# Patient Record
Sex: FEMALE | Race: WHITE | NOT HISPANIC OR LATINO | URBAN - METROPOLITAN AREA
[De-identification: names, ages, dates, MRNs, and addresses within clinical notes are randomized per-mention and may not be internally consistent; named-entity substitution may affect disease eponyms.]

---

## 2020-07-30 ENCOUNTER — OFFICE VISIT (OUTPATIENT)
Dept: FAMILY MEDICINE CLINIC | Facility: CLINIC | Age: 55
End: 2020-07-30
Payer: COMMERCIAL

## 2020-07-30 ENCOUNTER — APPOINTMENT (OUTPATIENT)
Dept: RADIOLOGY | Facility: CLINIC | Age: 55
End: 2020-07-30
Payer: COMMERCIAL

## 2020-07-30 VITALS
HEIGHT: 66 IN | SYSTOLIC BLOOD PRESSURE: 120 MMHG | DIASTOLIC BLOOD PRESSURE: 84 MMHG | TEMPERATURE: 98.9 F | BODY MASS INDEX: 14.3 KG/M2 | WEIGHT: 89 LBS | RESPIRATION RATE: 24 BRPM | OXYGEN SATURATION: 99 % | HEART RATE: 115 BPM

## 2020-07-30 DIAGNOSIS — Z11.4 SCREENING FOR HIV (HUMAN IMMUNODEFICIENCY VIRUS): ICD-10-CM

## 2020-07-30 DIAGNOSIS — Z93.3 COLOSTOMY STATUS (HCC): ICD-10-CM

## 2020-07-30 DIAGNOSIS — F41.1 GAD (GENERALIZED ANXIETY DISORDER): ICD-10-CM

## 2020-07-30 DIAGNOSIS — Z00.00 ANNUAL PHYSICAL EXAM: Primary | ICD-10-CM

## 2020-07-30 DIAGNOSIS — F32.0 CURRENT MILD EPISODE OF MAJOR DEPRESSIVE DISORDER WITHOUT PRIOR EPISODE (HCC): ICD-10-CM

## 2020-07-30 DIAGNOSIS — Z12.31 ENCOUNTER FOR SCREENING MAMMOGRAM FOR BREAST CANCER: ICD-10-CM

## 2020-07-30 DIAGNOSIS — F10.20 ALCOHOLISM (HCC): ICD-10-CM

## 2020-07-30 DIAGNOSIS — F17.200 TOBACCO USE DISORDER: ICD-10-CM

## 2020-07-30 DIAGNOSIS — R06.02 SOB (SHORTNESS OF BREATH): ICD-10-CM

## 2020-07-30 DIAGNOSIS — Z12.11 SCREENING FOR COLORECTAL CANCER: ICD-10-CM

## 2020-07-30 DIAGNOSIS — Z13.6 SCREENING FOR CARDIOVASCULAR CONDITION: ICD-10-CM

## 2020-07-30 DIAGNOSIS — Z12.12 SCREENING FOR COLORECTAL CANCER: ICD-10-CM

## 2020-07-30 DIAGNOSIS — Z13.1 SCREENING FOR DIABETES MELLITUS: ICD-10-CM

## 2020-07-30 PROCEDURE — 71046 X-RAY EXAM CHEST 2 VIEWS: CPT

## 2020-07-30 PROCEDURE — 99386 PREV VISIT NEW AGE 40-64: CPT | Performed by: FAMILY MEDICINE

## 2020-07-30 NOTE — ASSESSMENT & PLAN NOTE
BMI Counseling: Body mass index is 14 36 kg/m²  The BMI is below normal  Patient was advised to gain weight  Dietary education for weight gain was provided to the patient today

## 2020-07-30 NOTE — PATIENT INSTRUCTIONS

## 2020-07-30 NOTE — PROGRESS NOTES
[address] [facility]    NAME: Jayshree Huang  AGE: 53 y o  SEX: female  : 1965     DATE: 2020     Assessment and Plan:     Problem List Items Addressed This Visit        Other    Colostomy status ([facility] )    Relevant Orders    Ambulatory referral to Gastroenterology    KRAIG (generalized anxiety disorder)    Relevant Orders    Lipid panel    Comprehensive metabolic panel    CBC and differential    TSH, 3rd generation with Free T4 reflex    UA (URINE) with reflex to Scope    Current mild episode of major depressive disorder without prior episode ([place] [id number] )     Depression Screening Follow-up Plan: Patient's depression screening was positive with a PHQ-2 score of 6  Their PHQ-9 score was 27  Patient declines further evaluation by mental health professional and/or medications  They have no active suicidal ideations  Brief counseling provided and recommend additional follow-up/re-evaluation at next office visit  Patient offered resources for help with her depression/anxiety/alcohol use disorder but she is not willing to seek care at this time          Relevant Orders    Lipid panel    Comprehensive metabolic panel    CBC and differential    TSH, 3rd generation with Free T4 reflex    UA (URINE) with reflex to Scope    Tobacco use disorder    Relevant Orders    XR chest pa & lateral    Alcoholism ([place] [address] )    Relevant Orders    Ambulatory referral to Gastroenterology    Lipid panel    Comprehensive metabolic panel    CBC and differential    TSH, 3rd generation with Free T4 reflex    UA (URINE) with reflex to Scope    Body mass index (BMI) 19 9 or less, adult     BMI Counseling: Body mass index is 14 36 kg/m²  The BMI is below normal  Patient was advised to gain weight  Dietary education for weight gain was provided to the patient today             Other Visit Diagnoses     Annual physical exam    -  Primary    Relevant Orders    Lipid panel Comprehensive metabolic panel    CBC and differential    TSH, 3rd generation with Free T4 reflex    UA (URINE) with reflex to Scope    Encounter for screening mammogram for breast cancer        Relevant Orders    Mammo screening bilateral w 3d & cad    Screening for colorectal cancer        Relevant Orders    Ambulatory referral to Gastroenterology    Screening for diabetes mellitus        Relevant Orders    Lipid panel    Comprehensive metabolic panel    CBC and differential    TSH, 3rd generation with Free T4 reflex    UA (URINE) with reflex to Scope    Screening for cardiovascular condition        Relevant Orders    Lipid panel    Comprehensive metabolic panel    CBC and differential    TSH, 3rd generation with Free T4 reflex    UA (URINE) with reflex to Scope    Screening for HIV (human immunodeficiency virus)        SOB (shortness of breath)        Relevant Orders    XR chest pa & lateral          New Patient est care today  She has not seen a doctor in years  She has poor insight into her health issues  I had a long discussion with her about my concerns with her alcohol use, depression and anxiety and offered to est her with [facility] and [facility] resources for recovery and addiction  She state she is not ready to do this  In regards to her tobacco use I am sure she has COPD for her 40 pack year history she was not interested in testing but was agreeable to at least get a CXR  I did order some blood work for her as well  She has a colostomy in place from her history of pelvic infection in 2015  She does not see why I want to refer her to GI for follow up  I advised her to see them ensure everything is stable and for what I suspect will be alcoholic related liver disease  I will see her back in 1 month or sooner if needed         Immunizations and preventive care screenings were discussed with patient today  Appropriate education was printed on patient's after visit summary      Counseling:  Alcohol/drug use: discussed moderation in alcohol intake, the recommendations for healthy alcohol use, and avoidance of illicit drug use  Dental Health: discussed importance of regular tooth brushing, flossing, and dental visits  Injury prevention: discussed safety/seat belts, safety helmets, smoke detectors, carbon dioxide detectors, and smoking near bedding or upholstery  Sexual health: discussed sexually transmitted diseases, partner selection, use of condoms, avoidance of unintended pregnancy, and contraceptive alternatives  · Exercise: the importance of regular exercise/physical activity was discussed  Recommend exercise 3-5 times per week for at least 30 minutes  Return in about 4 weeks (around 8/27/2020) for lab review  Chief Complaint:     Chief Complaint   Patient presents with    [facility]      History of Present Illness:     Adult Annual Physical   Patient here for a comprehensive physical exam    Has not seen a family doctor in 3 years  She has depression/anxiety  Reports history of pelvic floor mass -states she now has a colostomy     Social: tobacco use- roles her own cigs x 40 years      Diet and Physical Activity  · Diet/Nutrition: well balanced diet  · Exercise: no formal exercise        Depression Screening  PHQ-9 Depression Screening    PHQ-9:    Frequency of the following problems over the past two weeks:       Little interest or pleasure in doing things:  3 - nearly every day  Feeling down, depressed, or hopeless:  3 - nearly every day  Trouble falling or staying asleep, or sleeping too much:  3 - nearly every day  Feeling tired or having little energy:  3 - nearly every day  Poor appetite or overeating:  3 - nearly every day  Feeling bad about yourself - or that you are a failure or have let yourself or your family down:  3 - nearly every day  Trouble concentrating on things, such as reading the newspaper or watching television:  3 - nearly every day  Moving or speaking so slowly that other people could have noticed  Or the opposite - being so fidgety or restless that you have been moving around a lot more than usual:  3 - nearly every day  Thoughts that you would be better off dead, or of hurting yourself in some way:  3 - nearly every day  PHQ-2 Score:  6  PHQ-9 Score:  27       /GYN Health  · Patient reports complete hysterectomy      Review of Systems:     Review of Systems   Constitutional: Negative for chills, fatigue and fever  HENT: Negative for congestion, postnasal drip, rhinorrhea and sinus pressure  Eyes: Negative for photophobia and visual disturbance  Respiratory: Positive for cough and shortness of breath  Cardiovascular: Negative for chest pain, palpitations and leg swelling  Gastrointestinal: Negative for abdominal pain, constipation, diarrhea, nausea and vomiting  Genitourinary: Negative for difficulty urinating and dysuria  Musculoskeletal: Negative for arthralgias and myalgias  Skin: Negative for color change and rash  Neurological: Negative for dizziness, weakness, light-headedness and headaches        Past Medical History:     Past Medical History:   Diagnosis Date    Alcoholism ([facility] [address] )     Depression     Hypothyroidism     PTSD (post-traumatic stress disorder)       Past Surgical History:     Past Surgical History:   Procedure Laterality Date    COLOSTOMY      CYSTOSCOPY W/ URETERAL STENT PLACEMENT Left     EXPLORATORY LAPAROTOMY      HYSTERECTOMY      removal of both ovaries      Social History:        Social History     Socioeconomic History    Marital status: /Civil Union     Spouse name: None    Number of children: None    Years of education: None    Highest education level: None   Occupational History    None   Social Needs    Financial resource strain: None    Food insecurity:     Worry: Never true     Inability: Never true    Transportation needs:     Medical: No     Non-medical: No   Tobacco Use    Smoking status: Current Every Day Smoker     Types: Cigarettes    Smokeless tobacco: Current User   Substance and Sexual Activity    Alcohol use: Yes     Frequency: 4 or more times a week     Drinks per session: 10 or more     Binge frequency: Daily or almost daily     Comment: drinks beer    Drug use: Not Currently     Comment: Denies use    Sexual activity: Not Currently   Lifestyle    Physical activity:     Days per week: 0 days     Minutes per session: 0 min    Stress: Very much   Relationships    Social connections:     Talks on phone: More than three times a week     Gets together: Never     Attends Scientologist service: Never     Active member of club or organization: No     Attends meetings of clubs or organizations: Never     Relationship status:     Intimate partner violence:     Fear of current or ex partner: No     Emotionally abused: No     Physically abused: No     Forced sexual activity: No   Other Topics Concern    None   Social History Narrative    None      Family History:     Family History   Problem Relation Age of Onset    Heart disease Mother     Stroke Mother     Hypertension Mother     Kidney disease Mother     No Known Problems Father     No Known Problems Sister     No Known Problems Brother       Current Medications:     No current outpatient medications on file  No current facility-administered medications for this visit  Allergies:     No Known Allergies   Physical Exam:     /84   Pulse (!) 115   Temp 98 9 °F (37 2 °C) (Tympanic)   Resp (!) 24   Ht 5' 6" (1 676 m)   Wt 40 4 kg (89 lb)   SpO2 99%   BMI 14 36 kg/m²     Physical Exam   Constitutional: She is oriented to person, place, and time  She appears cachectic  Chronically ill appearing   Appears older than stated age    HENT:   Head: Normocephalic and atraumatic  Mouth/Throat: Oropharynx is clear and moist    Eyes: Pupils are equal, round, and reactive to light  Neck: Normal range of motion  Neck supple  Cardiovascular: Normal rate, regular rhythm and normal heart sounds  Pulmonary/Chest: Effort normal  No respiratory distress  She has decreased breath sounds  She has no wheezes  She has rhonchi  Abdominal: Soft  Bowel sounds are normal  She exhibits no distension  There is no tenderness  Colostomy in place   Musculoskeletal: Normal range of motion  She exhibits no edema or tenderness  Neurological: She is alert and oriented to person, place, and time  Skin: Skin is warm and dry  Psychiatric: She has a normal mood and affect   Her behavior is normal        Jul[address] Cesar [address], [address]

## 2020-07-30 NOTE — ASSESSMENT & PLAN NOTE
Depression Screening Follow-up Plan: Patient's depression screening was positive with a PHQ-2 score of 6  Their PHQ-9 score was 27  Patient declines further evaluation by mental health professional and/or medications  They have no active suicidal ideations  Brief counseling provided and recommend additional follow-up/re-evaluation at next office visit    Patient offered resources for help with her depression/anxiety/alcohol use disorder but she is not willing to seek care at this time

## 2020-07-31 DIAGNOSIS — R93.89 ABNORMAL CXR: ICD-10-CM

## 2020-07-31 DIAGNOSIS — F17.200 TOBACCO USE DISORDER: Primary | ICD-10-CM

## 2020-07-31 DIAGNOSIS — R06.02 SOB (SHORTNESS OF BREATH): ICD-10-CM

## 2020-08-14 ENCOUNTER — TELEPHONE (OUTPATIENT)
Dept: OTHER | Facility: OTHER | Age: 55
End: 2020-08-14

## 2020-08-14 ENCOUNTER — TELEPHONE (OUTPATIENT)
Dept: FAMILY MEDICINE CLINIC | Facility: CLINIC | Age: 55
End: 2020-08-14

## 2020-08-14 DIAGNOSIS — Z93.3 COLOSTOMY STATUS (HCC): Primary | ICD-10-CM

## 2020-08-14 RX ORDER — COLOSTOMY-ILEOST.SET,NONSTERIL 12"
EACH MISCELLANEOUS DAILY
Qty: 1 KIT | Refills: 0 | Status: SHIPPED | OUTPATIENT
Start: 2020-08-14

## 2020-08-14 NOTE — TELEPHONE ENCOUNTER
Patient was referred to GI at our last visit for follow up of her colostomy  I do not know what supplies she needs as she just established care here and we have no specifics  She needs to get more specifics from the pharmacy or medical supply company or reach out to GI as I recommended at our visit

## 2020-08-14 NOTE — PROGRESS NOTES
Called patient to review her concerns about her colostomy supplies  She states she is almost out of her bags  She wishes a Rx sent to [phone] ([facility])  Patient knows she needs to est care with GI and has an appointment in 2 weeks  She admits she has been drinking again  She denies any thoughts of self harm  She states she will always be honest with her doctor   She will call us if this should change or seek care immediately at the ER

## 2020-08-14 NOTE — TELEPHONE ENCOUNTER
Melissa Robertson from [address] called  She said that patient had called them to schedule an appt  She told them that we wouldn't order colostomy supplies for her and she said patient sounded intoxicated  She said patient mentioned that she doesn't mind if she sees god, and that she has the means to "off" herself  I let her know that the patient is new to us, and that we wanted patient to get the information for her colostomy bags, but she never did  I told her I would pass the information to you

## 2025-04-02 ENCOUNTER — APPOINTMENT (EMERGENCY)
Dept: CT IMAGING | Facility: HOSPITAL | Age: 60
End: 2025-04-02
Payer: COMMERCIAL

## 2025-04-02 ENCOUNTER — HOSPITAL ENCOUNTER (EMERGENCY)
Facility: HOSPITAL | Age: 60
Discharge: HOME | End: 2025-04-02
Attending: EMERGENCY MEDICINE
Payer: COMMERCIAL

## 2025-04-02 VITALS
RESPIRATION RATE: 16 BRPM | TEMPERATURE: 98.2 F | DIASTOLIC BLOOD PRESSURE: 76 MMHG | SYSTOLIC BLOOD PRESSURE: 128 MMHG | HEART RATE: 77 BPM | OXYGEN SATURATION: 96 %

## 2025-04-02 DIAGNOSIS — R26.2 AMBULATORY DYSFUNCTION: ICD-10-CM

## 2025-04-02 DIAGNOSIS — Z93.3 COLOSTOMY STATUS (HCC): Primary | ICD-10-CM

## 2025-04-02 DIAGNOSIS — R33.9 URINARY RETENTION: ICD-10-CM

## 2025-04-02 DIAGNOSIS — K59.00 CONSTIPATION: ICD-10-CM

## 2025-04-02 DIAGNOSIS — E87.1 HYPONATREMIA: ICD-10-CM

## 2025-04-02 LAB
ALBUMIN SERPL BCG-MCNC: 4.4 G/DL (ref 3.5–5)
ALP SERPL-CCNC: 83 U/L (ref 34–104)
ALT SERPL W P-5'-P-CCNC: 12 U/L (ref 7–52)
AMPHETAMINES SERPL QL SCN: NEGATIVE
ANION GAP SERPL CALCULATED.3IONS-SCNC: 7 MMOL/L (ref 4–13)
AST SERPL W P-5'-P-CCNC: 15 U/L (ref 13–39)
BARBITURATES UR QL: NEGATIVE
BASOPHILS # BLD AUTO: 0.11 THOUSANDS/ÂΜL (ref 0–0.1)
BASOPHILS NFR BLD AUTO: 1 % (ref 0–1)
BENZODIAZ UR QL: NEGATIVE
BILIRUB SERPL-MCNC: 0.63 MG/DL (ref 0.2–1)
BILIRUB UR QL STRIP: NEGATIVE
BUN SERPL-MCNC: 5 MG/DL (ref 5–25)
CALCIUM SERPL-MCNC: 9.7 MG/DL (ref 8.4–10.2)
CHLORIDE SERPL-SCNC: 93 MMOL/L (ref 96–108)
CLARITY UR: CLEAR
CO2 SERPL-SCNC: 30 MMOL/L (ref 21–32)
COCAINE UR QL: NEGATIVE
COLOR UR: ABNORMAL
CREAT SERPL-MCNC: 0.4 MG/DL (ref 0.6–1.3)
EOSINOPHIL # BLD AUTO: 0.16 THOUSAND/ÂΜL (ref 0–0.61)
EOSINOPHIL NFR BLD AUTO: 2 % (ref 0–6)
ERYTHROCYTE [DISTWIDTH] IN BLOOD BY AUTOMATED COUNT: 13 % (ref 11.6–15.1)
FENTANYL UR QL SCN: NEGATIVE
GFR SERPL CREATININE-BSD FRML MDRD: 113 ML/MIN/1.73SQ M
GLUCOSE SERPL-MCNC: 113 MG/DL (ref 65–140)
GLUCOSE UR STRIP-MCNC: NEGATIVE MG/DL
HCT VFR BLD AUTO: 45.7 % (ref 34.8–46.1)
HGB BLD-MCNC: 15.4 G/DL (ref 11.5–15.4)
HGB UR QL STRIP.AUTO: NEGATIVE
HYDROCODONE UR QL SCN: NEGATIVE
IMM GRANULOCYTES # BLD AUTO: 0.04 THOUSAND/UL (ref 0–0.2)
IMM GRANULOCYTES NFR BLD AUTO: 1 % (ref 0–2)
KETONES UR STRIP-MCNC: ABNORMAL MG/DL
LEUKOCYTE ESTERASE UR QL STRIP: NEGATIVE
LIPASE SERPL-CCNC: 19 U/L (ref 11–82)
LYMPHOCYTES # BLD AUTO: 2.08 THOUSANDS/ÂΜL (ref 0.6–4.47)
LYMPHOCYTES NFR BLD AUTO: 25 % (ref 14–44)
MAGNESIUM SERPL-MCNC: 1.7 MG/DL (ref 1.9–2.7)
MCH RBC QN AUTO: 31.4 PG (ref 26.8–34.3)
MCHC RBC AUTO-ENTMCNC: 33.7 G/DL (ref 31.4–37.4)
MCV RBC AUTO: 93 FL (ref 82–98)
METHADONE UR QL: NEGATIVE
MONOCYTES # BLD AUTO: 0.5 THOUSAND/ÂΜL (ref 0.17–1.22)
MONOCYTES NFR BLD AUTO: 6 % (ref 4–12)
NEUTROPHILS # BLD AUTO: 5.4 THOUSANDS/ÂΜL (ref 1.85–7.62)
NEUTS SEG NFR BLD AUTO: 65 % (ref 43–75)
NITRITE UR QL STRIP: NEGATIVE
NRBC BLD AUTO-RTO: 0 /100 WBCS
OPIATES UR QL SCN: NEGATIVE
OXYCODONE+OXYMORPHONE UR QL SCN: NEGATIVE
PCP UR QL: NEGATIVE
PH UR STRIP.AUTO: 7 [PH]
PLATELET # BLD AUTO: 333 THOUSANDS/UL (ref 149–390)
PMV BLD AUTO: 9.1 FL (ref 8.9–12.7)
POTASSIUM SERPL-SCNC: 3.9 MMOL/L (ref 3.5–5.3)
PROT SERPL-MCNC: 7.6 G/DL (ref 6.4–8.4)
PROT UR STRIP-MCNC: NEGATIVE MG/DL
RBC # BLD AUTO: 4.9 MILLION/UL (ref 3.81–5.12)
SODIUM SERPL-SCNC: 130 MMOL/L (ref 135–147)
SP GR UR STRIP.AUTO: <1.005 (ref 1–1.03)
THC UR QL: NEGATIVE
UROBILINOGEN UR STRIP-ACNC: <2 MG/DL
WBC # BLD AUTO: 8.29 THOUSAND/UL (ref 4.31–10.16)

## 2025-04-02 PROCEDURE — 83690 ASSAY OF LIPASE: CPT | Performed by: EMERGENCY MEDICINE

## 2025-04-02 PROCEDURE — 99244 OFF/OP CNSLTJ NEW/EST MOD 40: CPT | Performed by: SURGERY

## 2025-04-02 PROCEDURE — 84443 ASSAY THYROID STIM HORMONE: CPT | Performed by: EMERGENCY MEDICINE

## 2025-04-02 PROCEDURE — 96361 HYDRATE IV INFUSION ADD-ON: CPT

## 2025-04-02 PROCEDURE — 80053 COMPREHEN METABOLIC PANEL: CPT | Performed by: EMERGENCY MEDICINE

## 2025-04-02 PROCEDURE — 74177 CT ABD & PELVIS W/CONTRAST: CPT

## 2025-04-02 PROCEDURE — 99284 EMERGENCY DEPT VISIT MOD MDM: CPT

## 2025-04-02 PROCEDURE — 83735 ASSAY OF MAGNESIUM: CPT | Performed by: EMERGENCY MEDICINE

## 2025-04-02 PROCEDURE — 96365 THER/PROPH/DIAG IV INF INIT: CPT

## 2025-04-02 PROCEDURE — 99284 EMERGENCY DEPT VISIT MOD MDM: CPT | Performed by: EMERGENCY MEDICINE

## 2025-04-02 PROCEDURE — 85025 COMPLETE CBC W/AUTO DIFF WBC: CPT | Performed by: EMERGENCY MEDICINE

## 2025-04-02 PROCEDURE — 81003 URINALYSIS AUTO W/O SCOPE: CPT | Performed by: EMERGENCY MEDICINE

## 2025-04-02 PROCEDURE — 80307 DRUG TEST PRSMV CHEM ANLYZR: CPT | Performed by: EMERGENCY MEDICINE

## 2025-04-02 PROCEDURE — 36415 COLL VENOUS BLD VENIPUNCTURE: CPT | Performed by: EMERGENCY MEDICINE

## 2025-04-02 RX ORDER — MAGNESIUM SULFATE HEPTAHYDRATE 40 MG/ML
2 INJECTION, SOLUTION INTRAVENOUS ONCE
Status: COMPLETED | OUTPATIENT
Start: 2025-04-02 | End: 2025-04-02

## 2025-04-02 RX ORDER — DOCUSATE SODIUM 100 MG/1
100 CAPSULE, LIQUID FILLED ORAL EVERY 12 HOURS
Qty: 30 CAPSULE | Refills: 0 | Status: SHIPPED | OUTPATIENT
Start: 2025-04-02

## 2025-04-02 RX ADMIN — IOHEXOL 100 ML: 350 INJECTION, SOLUTION INTRAVENOUS at 14:46

## 2025-04-02 RX ADMIN — SODIUM CHLORIDE 500 ML: 0.9 INJECTION, SOLUTION INTRAVENOUS at 11:37

## 2025-04-02 RX ADMIN — MAGNESIUM SULFATE HEPTAHYDRATE 2 G: 40 INJECTION, SOLUTION INTRAVENOUS at 12:29

## 2025-04-02 RX ADMIN — IOHEXOL 50 ML: 240 INJECTION, SOLUTION INTRATHECAL; INTRAVASCULAR; INTRAVENOUS; ORAL at 14:46

## 2025-04-02 RX ADMIN — SODIUM CHLORIDE 1000 ML: 0.9 INJECTION, SOLUTION INTRAVENOUS at 12:59

## 2025-04-02 NOTE — ASSESSMENT & PLAN NOTE
60yo F w/ PMH of alcohol use disorder, tobacco use disorder, UC, epilepsy (not on any medications) presenting due to decreased ostomy output x2 weeks and abdominal distension.   Patient is a poor historian, daughter at bedside assisting with history    AVSS  No leukocytosis  Hgb stable   Cr Stable     CTAP ordered and pending     Exam: unkempt patient, appears frustrated, abdomen soft, distended, nontender, ostomy pink and viable, stool noted to be coming from stoma on exam     Plan:   - follow up CT AP, additional recommendations pending results   - Recommend follow up with GI for management of UC s/p colectomy with end ostomy and PCP for medical management as patient has not been to see a physician in over 5 years

## 2025-04-02 NOTE — ED PROVIDER NOTES
Time reflects when diagnosis was documented in both MDM as applicable and the Disposition within this note       Time User Action Codes Description Comment    4/2/2025  2:04 PM Hao Green [Z93.3] Colostomy status (HCC)     4/2/2025  4:22 PM Hao Green [R33.9] Urinary retention     4/2/2025  4:22 PM Hao Green [R26.2] Ambulatory dysfunction     4/2/2025  4:51 PM Hao Green [E87.1] Hyponatremia     4/2/2025  4:52 PM Hao Green [K59.00] Constipation           ED Disposition       ED Disposition   Discharge    Condition   Stable    Date/Time   Wed Apr 2, 2025  4:43 PM    Comment   Berta Bing discharge to home/self care.                   Assessment & Plan       Medical Decision Making  Diff includes bowel obstruction, failure to thrive, dehydration, anxiety    Amount and/or Complexity of Data Reviewed  Labs: ordered.  Radiology: ordered.    Risk  OTC drugs.  Prescription drug management.        ED Course as of 04/02/25 1730   Wed Apr 02, 2025   1611 Patient is stating that she can't go home - she can't walk and her  can't help her anymore over last few weeks   1635 Patient states she has hx of seizures and feels dizzy like she is going to get one.  She saw kathy neuro in the past - last seizure 3 days ago but dilantin had too many side effects, she stopped on her own and hasn't seen neurologist     I ambulated patient myself - she was able to get up and walk with minimal assistance and has a walker at home.      Medications   sodium chloride 0.9 % bolus 500 mL (0 mL Intravenous Stopped 4/2/25 1237)   magnesium sulfate 2 g/50 mL IVPB (premix) 2 g (0 g Intravenous Stopped 4/2/25 1329)   sodium chloride 0.9 % bolus 1,000 mL (0 mL Intravenous Stopped 4/2/25 1559)   iohexol (OMNIPAQUE) 350 MG/ML injection (MULTI-DOSE) 100 mL (100 mL Intravenous Given 4/2/25 1446)   iohexol (OMNIPAQUE) 240 MG/ML solution 50 mL (50 mL Oral Given 4/2/25 1446)       ED Risk Strat Scores     "                        SBIRT 20yo+      Flowsheet Row Most Recent Value   Initial Alcohol Screen: US AUDIT-C     1. How often do you have a drink containing alcohol? 0 Filed at: 04/02/2025 1022   2. How many drinks containing alcohol do you have on a typical day you are drinking?  0 Filed at: 04/02/2025 1022   3a. Male UNDER 65: How often do you have five or more drinks on one occasion? 0 Filed at: 04/02/2025 1022   3b. FEMALE Any Age, or MALE 65+: How often do you have 4 or more drinks on one occassion? 0 Filed at: 04/02/2025 1022   Audit-C Score 0 Filed at: 04/02/2025 1022   ROZINA: How many times in the past year have you...    Used an illegal drug or used a prescription medication for non-medical reasons? Never Filed at: 04/02/2025 1022                            History of Present Illness       Chief Complaint   Patient presents with    Constipation     Pt reports not wearing her ostomy bag because it falls off and itchy. Reports \"no output out her ass or her ostomy in over two weeks\". Denies pain. Patient smacking abdomen in triage to indicate no pain on palpation.        Past Medical History:   Diagnosis Date    Alcoholism (HCC)     Depression     Hypothyroidism     PTSD (post-traumatic stress disorder)       Past Surgical History:   Procedure Laterality Date    COLOSTOMY      CYSTOSCOPY W/ URETERAL STENT PLACEMENT Left     EXPLORATORY LAPAROTOMY      SALPINGECTOMY Bilateral 02/2025      Family History   Problem Relation Age of Onset    Heart disease Mother     Stroke Mother     Hypertension Mother     Kidney disease Mother     No Known Problems Father     No Known Problems Sister     No Known Problems Brother       Social History     Tobacco Use    Smoking status: Every Day     Types: Cigarettes    Smokeless tobacco: Current   Vaping Use    Vaping status: Never Used   Substance Use Topics    Alcohol use: Yes     Comment: drinks beer    Drug use: Not Currently     Comment: Denies use      E-Cigarette/Vaping    " E-Cigarette Use Never User       E-Cigarette/Vaping Substances      I have reviewed and agree with the history as documented.     Hx from patient and daughter - pmh ulcerative colitis bowel resection and ostomy 10 years ago [facility].  Now concern for no stool from stoma for last 2 weeks except small amount leaking.  Daughter states she is not as lucid as normal either.  Patient states she feels like she is starving and thirsty but drinks large amounts of water.  She last urinated 2 days ago normal urine but today was dark brown.  She had n/v last Wednesday mostly dry heaves and chills.  She last drank ETOH 2 months ago.        Review of Systems   Constitutional:  Positive for appetite change and chills. Negative for fever.   HENT:  Negative for rhinorrhea and sore throat.    Respiratory:  Negative for shortness of breath.    Cardiovascular:  Negative for chest pain.   Gastrointestinal:  Positive for constipation, nausea and vomiting. Negative for abdominal pain and diarrhea.   Genitourinary:  Positive for decreased urine volume. Negative for dysuria and frequency.   Skin:  Negative for rash.   All other systems reviewed and are negative.          Objective       ED Triage Vitals   Temperature Pulse Blood Pressure Respirations SpO2 Patient Position - Orthostatic VS   04/02/25 1021 04/02/25 1022 04/02/25 1022 04/02/25 1022 04/02/25 1022 04/02/25 1022   98.2 °F (36.8 °C) 98 140/92 16 97 % Sitting      Temp Source Heart Rate Source BP Location FiO2 (%) Pain Score    04/02/25 1021 04/02/25 1022 04/02/25 1022 -- --    Temporal Monitor Left arm        Vitals      Date and Time Temp Pulse SpO2 Resp BP Pain Score FACES Pain Rating User   04/02/25 1630 -- 77 96 % 16 128/76 -- --    04/02/25 1500 -- 79 98 % 16 105/63 -- --    04/02/25 1430 -- 61 100 % 16 126/72 -- --    04/02/25 1400 -- 74 94 % 16 115/81 -- --    04/02/25 1330 -- 73 98 % 16 122/77 -- --    04/02/25 1300 -- 71 97 % 16 117/73 -- --     04/02/25 1230 -- 79 98 % 17 128/71 -- -- LK   04/02/25 1200 -- 80 98 % 16 130/78 -- -- AY   04/02/25 1130 -- 83 98 % 16 121/73 -- -- AY   04/02/25 1022 -- 98 97 % 16 140/92 -- -- HR   04/02/25 1021 98.2 °F (36.8 °C) -- -- -- -- -- -- HR            Physical Exam  Vitals and nursing note reviewed.   Constitutional:       Appearance: She is well-developed.   HENT:      Head: Normocephalic and atraumatic.      Right Ear: External ear normal.      Left Ear: External ear normal.      Nose: Nose normal.   Eyes:      Conjunctiva/sclera: Conjunctivae normal.      Pupils: Pupils are equal, round, and reactive to light.   Cardiovascular:      Rate and Rhythm: Normal rate and regular rhythm.      Heart sounds: Normal heart sounds.   Pulmonary:      Effort: Pulmonary effort is normal. No respiratory distress.      Breath sounds: Normal breath sounds. No wheezing.   Abdominal:      General: Bowel sounds are normal. There is no distension.      Palpations: Abdomen is soft.      Tenderness: There is no abdominal tenderness.      Comments: Ostomy appears pink and normal - small amount stool coming from ostomy.  Non-tender   Musculoskeletal:         General: No deformity. Normal range of motion.      Cervical back: Normal range of motion and neck supple. No spinous process tenderness.   Skin:     General: Skin is warm and dry.      Findings: No rash.   Neurological:      General: No focal deficit present.      Mental Status: She is alert and oriented to person, place, and time.      GCS: GCS eye subscore is 4. GCS verbal subscore is 5. GCS motor subscore is 6.      Cranial Nerves: Cranial nerves 2-12 are intact.      Sensory: Sensation is intact. No sensory deficit.      Motor: Motor function is intact.      Coordination: Coordination is intact.   Psychiatric:         Mood and Affect: Mood is anxious.         Speech: Speech is rapid and pressured.         Behavior: Behavior is cooperative.         Thought Content: Thought content  does not include homicidal or suicidal ideation.         Cognition and Memory: Cognition normal.         Results Reviewed       Procedure Component Value Units Date/Time    Rapid drug screen, urine [[id number]]  (Normal) Collected: 04/02/25 1420    Lab Status: Final result Specimen: Urine, Clean Catch Updated: 04/02/25 1635     Amph/Meth UR Negative     Barbiturate Ur Negative     Benzodiazepine Urine Negative     Cocaine Urine Negative     Methadone Urine Negative     Opiate Urine Negative     PCP Ur Negative     THC Urine Negative     Oxycodone Urine Negative     Fentanyl Urine Negative     HYDROCODONE URINE Negative    Narrative:      FOR MEDICAL PURPOSES ONLY.   IF CONFIRMATION NEEDED PLEASE CONTACT THE LAB WITHIN 5 DAYS.    Drug Screen Cutoff Levels:  AMPHETAMINE/METHAMPHETAMINES  1000 ng/mL  BARBITURATES     200 ng/mL  BENZODIAZEPINES     200 ng/mL  COCAINE      300 ng/mL  METHADONE      300 ng/mL  OPIATES      300 ng/mL  PHENCYCLIDINE     25 ng/mL  THC       50 ng/mL  OXYCODONE      100 ng/mL  FENTANYL      5 ng/mL  HYDROCODONE     300 ng/mL    UA w Reflex to Microscopic w Reflex to Culture [[id number]]  (Abnormal) Collected: 04/02/25 1420    Lab Status: Final result Specimen: Urine, Clean Catch Updated: 04/02/25 1505     Color, UA Light Yellow     Clarity, UA Clear     Specific Gravity, UA <1.005     pH, UA 7.0     Leukocytes, UA Negative     Nitrite, UA Negative     Protein, UA Negative mg/dl      Glucose, UA Negative mg/dl      Ketones, UA 10 (1+) mg/dl      Urobilinogen, UA <2.0 mg/dl      Bilirubin, UA Negative     Occult Blood, UA Negative    CMP [[id number]]  (Abnormal) Collected: 04/02/25 1134    Lab Status: Final result Specimen: Blood from Arm, Left Updated: 04/02/25 1155     Sodium 130 mmol/L      Potassium 3.9 mmol/L      Chloride 93 mmol/L      CO2 30 mmol/L      ANION GAP 7 mmol/L      BUN 5 mg/dL      Creatinine 0.40 mg/dL      Glucose 113 mg/dL      Calcium 9.7 mg/dL      AST 15 U/L      ALT 12  U/L      Alkaline Phosphatase 83 U/L      Total Protein 7.6 g/dL      Albumin 4.4 g/dL      Total Bilirubin 0.63 mg/dL      eGFR 113 ml/min/1.73sq m     Narrative:      National Kid[employer] Disease Foundation guidelines for Chronic Kidney Disease (CKD):     Stage 1 with normal or high GFR (GFR > 90 mL/min/1.73 square meters)    Stage 2 Mild CKD (GFR = 60-89 mL/min/1.73 square meters)    Stage 3A Moderate CKD (GFR = 45-59 mL/min/1.73 square meters)    Stage 3B Moderate CKD (GFR = 30-44 mL/min/1.73 square meters)    Stage 4 Severe CKD (GFR = 15-29 mL/min/1.73 square meters)    Stage 5 End Stage CKD (GFR <15 mL/min/1.73 square meters)  Note: GFR calculation is accurate only with a steady state creatinine    Lipase [[id number]]  (Normal) Collected: 04/02/25 1134    Lab Status: Final result Specimen: Blood from Arm, Left Updated: 04/02/25 1155     Lipase 19 u/L     Magnesium [[id number]]  (Abnormal) Collected: 04/02/25 1134    Lab Status: Final result Specimen: Blood from Arm, Left Updated: 04/02/25 1155     Magnesium 1.7 mg/dL     CBC and differential [[id number]]  (Abnormal) Collected: 04/02/25 1134    Lab Status: Final result Specimen: Blood from Arm, Left Updated: 04/02/25 1138     WBC 8.29 Thousand/uL      RBC 4.90 Million/uL      Hemoglobin 15.4 g/dL      Hematocrit 45.7 %      MCV 93 fL      MCH 31.4 pg      MCHC 33.7 g/dL      RDW 13.0 %      MPV 9.1 fL      Platelets 333 Thousands/uL      nRBC 0 /100 WBCs      Segmented % 65 %      Immature Grans % 1 %      Lymphocytes % 25 %      Monocytes % 6 %      Eosinophils Relative 2 %      Basophils Relative 1 %      Absolute Neutrophils 5.40 Thousands/µL      Absolute Immature Grans 0.04 Thousand/uL      Absolute Lymphocytes 2.08 Thousands/µL      Absolute Monocytes 0.50 Thousand/µL      Eosinophils Absolute 0.16 Thousand/µL      Basophils Absolute 0.11 Thousands/µL             CT Abdomen pelvis with contrast   Final Interpretation by Bi Vines MD (04/02 1357)       Marked bladder distention consistent with urinary retention.   Urology consult recommended.      Mass effect from the bladder upon small bowel results in segments with moderate dilation measuring up to 4 cm.   Mass effect from the bladder and dilated small bowel upon the colon leading into a left anterior ostomy likely accounts for decreased output.   Moderate colonic stool burden.   No evidence of intrinsic bowel obstruction.      The study was marked in EPIC for immediate notification.         Workstation performed: [id number]             Procedures    ED Medication and Procedure Management   Prior to Admission Medications   Prescriptions Last Dose Informant Patient Reported? Taking?   Ostomy Supplies (Premier Colostomy/Ileostomy) KIT   No No   Sig: by Does not apply route daily      Facility-Administered Medications: None     Patient's Medications   Discharge Prescriptions    DOCUSATE SODIUM (COLACE) 100 MG CAPSULE    Take 1 capsule (100 mg total) by mouth every 12 (twelve) hours       Start Date: 4/2/2025  End Date: --       Order Dose: 100 mg       Quantity: 30 capsule    Refills: 0       ED SEPSIS DOCUMENTATION   Time reflects when diagnosis was documented in both MDM as applicable and the Disposition within this note       Time User Action Codes Description Comment    4/2/2025  2:04 PM Hao Green [Z93.3] Colostomy status (HCC)     4/2/2025  4:22 PM Hao Green [R33.9] Urinary retention     4/2/2025  4:22 PM Hao Green [R26.2] Ambulatory dysfunction     4/2/2025  4:51 PM Hao Green [E87.1] Hyponatremia     4/2/2025  4:52 PM Hao Green [K59.00] Constipation                  Hao Green DO  04/02/25 0693

## 2025-04-02 NOTE — Clinical Note
Mayra Smart accompanied Berta Smart to the emergency department on 4/2/2025.    Return date if applicable: 04/03/2025        If you have any questions or concerns, please don't hesitate to call.      Hao Green, DO

## 2025-04-02 NOTE — CONSULTS
Consultation - Surgery-General   Name: Berta Smart 59 y.o. female I MRN: [id number]  Unit/Bed#: OVR 04 I Date of Admission: 4/2/2025   Date of Service: 4/2/2025 I Hospital Day: 0   Consult to Surgery - General  Consult performed by: Rachel Hopper PA-C  Consult ordered by: Hao Green DO        Physician Requesting Evaluation: Hao Green DO   Reason for Evaluation / Principal Problem: end colostomy in place, decreased stool output     Assessment & Plan  Colostomy in place (HCC)  58yo F w/ PMH of alcohol use disorder, tobacco use disorder, UC, epilepsy (not on any medications) presenting due to decreased ostomy output x2 weeks and abdominal distension.   Patient is a poor historian, daughter at bedside assisting with history    AVSS  No leukocytosis  Hgb stable   Cr Stable     CTAP ordered and pending     Exam: unkempt patient, appears frustrated, abdomen soft, distended, nontender, ostomy pink and viable, stool noted to be coming from stoma on exam     Plan:   - follow up CT AP, additional recommendations pending results   - Recommend follow up with GI for management of UC s/p colectomy with end ostomy and PCP for medical management as patient has not been to see a physician in over 5 years    Please contact the SecureChat role for the Surgery-General service with any questions/concerns.    History of Present Illness   Berta Smart is a 59 y.o. female w/ hx of epilepsy (unmediated), UC, alcohol use disorder, tobacco use disorder who presents with c/o decreased output from stoma x2 weeks. Patient is frustrated today and provides inadequate history. Daughter at bedside assisting with history taking. Approx 10 years ago patient underwent combination case with GYN/ONC and Colorectal surgery for what was thought to be a pelvic mass/ abscess vs UC. She had a OLIVIER and colon resection with end colostomy completed at that time. Since this time she has had difficulty following up with providers in the outpatient  "setting. She presents today with decreased ostomy output x2 weeks and abdominal distension. Stating she has \"a gallon a day or a week.. I don't know\" in output but feels that this is below her average. Denies nausea or vomiting. Denies abdominal pain. Has occasional mucous per rectum.     No leukocytosis today, vitals WNL, CT pending.     Daughter states pt quit drinking alcohol approx 2 months ago. Current everyday tobacco use, 1/2 - 1 ppd.     By end of history and exam patient stated \"I'm done with this\" in reference to providing history. Voice became raised and patient was making quick movements in stretcher. Discussed that we will await results of CT prior to making additional recommendations     Review of Systems   Constitutional:  Negative for chills and fever.   Respiratory:  Negative for chest tightness and shortness of breath.    Cardiovascular:  Negative for chest pain.   Gastrointestinal:  Positive for abdominal distention and constipation. Negative for abdominal pain, nausea and vomiting.   Neurological:  Negative for dizziness and weakness.   Psychiatric/Behavioral:  Negative for confusion.      Medical History Review: I have reviewed the patient's PMH, PSH, Social History, Family History, Meds, and Allergies     Objective :  Temp:  [98.2 °F (36.8 °C)] 98.2 °F (36.8 °C)  HR:  [71-98] 71  BP: (117-140)/(71-92) 117/73  Resp:  [16-17] 16  SpO2:  [97 %-98 %] 97 %  O2 Device: None (Room air)      Physical Exam  Vitals reviewed.   Constitutional:       General: She is awake.      Appearance: She is ill-appearing. She is not toxic-appearing or diaphoretic.      Comments: Unkempt in appearance  Clothing soiled     HENT:      Head: Normocephalic and atraumatic.   Cardiovascular:      Rate and Rhythm: Normal rate.   Pulmonary:      Effort: No respiratory distress.   Abdominal:      General: There is distension.      Palpations: Abdomen is soft.      Tenderness: There is no abdominal tenderness. There is no " guarding or rebound.      Comments: Stoma pink and viable, functioning with stool present on exam    Skin:     General: Skin is warm and dry.   Neurological:      General: No focal deficit present.      Mental Status: She is alert and oriented to person, place, and time.   Psychiatric:         Speech: Speech is tangential.         Behavior: Behavior is cooperative.         Lab Results: I have reviewed the following results:  Recent Labs     04/02/25  1134   WBC 8.29   HGB 15.4   HCT 45.7      SODIUM 130*   K 3.9   CL 93*   CO2 30   BUN 5   CREATININE 0.40*   GLUC 113   MG 1.7*   AST 15   ALT 12   ALB 4.4   TBILI 0.63   ALKPHOS 83       Imaging Results Review: No pertinent imaging studies reviewed.  Other Study Results Review: No additional pertinent studies reviewed.    VTE Pharmacologic Prophylaxis: VTE covered by:    None     VTE Mechanical Prophylaxis: sequential compression device      Rachel Hopper, PAC

## 2025-04-04 ENCOUNTER — APPOINTMENT (EMERGENCY)
Dept: CT IMAGING | Facility: HOSPITAL | Age: 60
DRG: 052 | End: 2025-04-04
Payer: COMMERCIAL

## 2025-04-04 ENCOUNTER — APPOINTMENT (EMERGENCY)
Dept: RADIOLOGY | Facility: HOSPITAL | Age: 60
DRG: 052 | End: 2025-04-04
Payer: COMMERCIAL

## 2025-04-04 ENCOUNTER — HOSPITAL ENCOUNTER (INPATIENT)
Facility: HOSPITAL | Age: 60
LOS: 4 days | Discharge: HOME | DRG: 052 | End: 2025-04-08
Attending: EMERGENCY MEDICINE | Admitting: INTERNAL MEDICINE
Payer: COMMERCIAL

## 2025-04-04 DIAGNOSIS — R79.0 LOW MAGNESIUM LEVEL: ICD-10-CM

## 2025-04-04 DIAGNOSIS — R41.82 ALTERED MENTAL STATUS: Primary | ICD-10-CM

## 2025-04-04 DIAGNOSIS — G93.40 ENCEPHALOPATHY, UNSPECIFIED TYPE: ICD-10-CM

## 2025-04-04 PROBLEM — R33.9 URINARY RETENTION: Status: ACTIVE | Noted: 2025-04-04

## 2025-04-04 LAB
ALBUMIN SERPL BCG-MCNC: 4.3 G/DL (ref 3.5–5)
ALP SERPL-CCNC: 79 U/L (ref 34–104)
ALT SERPL W P-5'-P-CCNC: 12 U/L (ref 7–52)
AMMONIA PLAS-SCNC: 38 UMOL/L (ref 18–72)
AMORPH URATE CRY URNS QL MICRO: ABNORMAL
ANION GAP SERPL CALCULATED.3IONS-SCNC: 9 MMOL/L (ref 4–13)
APAP SERPL-MCNC: <2 UG/ML (ref 10–20)
APTT PPP: 34 SECONDS (ref 23–34)
AST SERPL W P-5'-P-CCNC: 18 U/L (ref 13–39)
ATRIAL RATE: 85 BPM
BACTERIA UR QL AUTO: ABNORMAL /HPF
BASOPHILS # BLD AUTO: 0.1 THOUSANDS/ÂΜL (ref 0–0.1)
BASOPHILS NFR BLD AUTO: 1 % (ref 0–1)
BILIRUB SERPL-MCNC: 0.88 MG/DL (ref 0.2–1)
BILIRUB UR QL STRIP: NEGATIVE
BUN SERPL-MCNC: 6 MG/DL (ref 5–25)
CALCIUM SERPL-MCNC: 9.7 MG/DL (ref 8.4–10.2)
CARDIAC TROPONIN I PNL SERPL HS: <2 NG/L (ref ?–50)
CHLORIDE SERPL-SCNC: 96 MMOL/L (ref 96–108)
CLARITY UR: CLEAR
CO2 SERPL-SCNC: 29 MMOL/L (ref 21–32)
COLOR UR: YELLOW
CREAT SERPL-MCNC: 0.45 MG/DL (ref 0.6–1.3)
EOSINOPHIL # BLD AUTO: 0.28 THOUSAND/ÂΜL (ref 0–0.61)
EOSINOPHIL NFR BLD AUTO: 4 % (ref 0–6)
ERYTHROCYTE [DISTWIDTH] IN BLOOD BY AUTOMATED COUNT: 12.9 % (ref 11.6–15.1)
ETHANOL SERPL-MCNC: <10 MG/DL
FOLATE SERPL-MCNC: >22.3 NG/ML
GFR SERPL CREATININE-BSD FRML MDRD: 109 ML/MIN/1.73SQ M
GLUCOSE SERPL-MCNC: 98 MG/DL (ref 65–140)
GLUCOSE UR STRIP-MCNC: NEGATIVE MG/DL
HCT VFR BLD AUTO: 45.3 % (ref 34.8–46.1)
HGB BLD-MCNC: 15 G/DL (ref 11.5–15.4)
HGB UR QL STRIP.AUTO: ABNORMAL
IMM GRANULOCYTES # BLD AUTO: 0.05 THOUSAND/UL (ref 0–0.2)
IMM GRANULOCYTES NFR BLD AUTO: 1 % (ref 0–2)
INR PPP: 0.97 (ref 0.85–1.19)
KETONES UR STRIP-MCNC: ABNORMAL MG/DL
LACTATE SERPL-SCNC: 0.6 MMOL/L (ref 0.5–2)
LEUKOCYTE ESTERASE UR QL STRIP: ABNORMAL
LYMPHOCYTES # BLD AUTO: 2.5 THOUSANDS/ÂΜL (ref 0.6–4.47)
LYMPHOCYTES NFR BLD AUTO: 32 % (ref 14–44)
MAGNESIUM SERPL-MCNC: 1.8 MG/DL (ref 1.9–2.7)
MCH RBC QN AUTO: 31.3 PG (ref 26.8–34.3)
MCHC RBC AUTO-ENTMCNC: 33.1 G/DL (ref 31.4–37.4)
MCV RBC AUTO: 94 FL (ref 82–98)
MONOCYTES # BLD AUTO: 0.5 THOUSAND/ÂΜL (ref 0.17–1.22)
MONOCYTES NFR BLD AUTO: 6 % (ref 4–12)
NEUTROPHILS # BLD AUTO: 4.37 THOUSANDS/ÂΜL (ref 1.85–7.62)
NEUTS SEG NFR BLD AUTO: 56 % (ref 43–75)
NITRITE UR QL STRIP: NEGATIVE
NON-SQ EPI CELLS URNS QL MICRO: ABNORMAL /HPF
NRBC BLD AUTO-RTO: 0 /100 WBCS
P AXIS: 84 DEGREES
PH UR STRIP.AUTO: 6.5 [PH]
PLATELET # BLD AUTO: 314 THOUSANDS/UL (ref 149–390)
PMV BLD AUTO: 9.8 FL (ref 8.9–12.7)
POTASSIUM SERPL-SCNC: 4.5 MMOL/L (ref 3.5–5.3)
PR INTERVAL: 148 MS
PROCALCITONIN SERPL-MCNC: <0.05 NG/ML
PROT SERPL-MCNC: 7.2 G/DL (ref 6.4–8.4)
PROT UR STRIP-MCNC: ABNORMAL MG/DL
PROTHROMBIN TIME: 13.4 SECONDS (ref 12.3–15)
QRS AXIS: 237 DEGREES
QRSD INTERVAL: 80 MS
QT INTERVAL: 376 MS
QTC INTERVAL: 447 MS
RBC # BLD AUTO: 4.8 MILLION/UL (ref 3.81–5.12)
RBC #/AREA URNS AUTO: ABNORMAL /HPF
SALICYLATES SERPL-MCNC: <5 MG/DL (ref 3–20)
SODIUM SERPL-SCNC: 134 MMOL/L (ref 135–147)
SP GR UR STRIP.AUTO: 1.02 (ref 1–1.03)
T WAVE AXIS: 64 DEGREES
TSH SERPL DL<=0.05 MIU/L-ACNC: 4.04 UIU/ML (ref 0.45–4.5)
UROBILINOGEN UR STRIP-ACNC: <2 MG/DL
VENTRICULAR RATE: 85 BPM
VIT B12 SERPL-MCNC: 455 PG/ML (ref 180–914)
WBC # BLD AUTO: 7.8 THOUSAND/UL (ref 4.31–10.16)
WBC #/AREA URNS AUTO: ABNORMAL /HPF

## 2025-04-04 PROCEDURE — 36415 COLL VENOUS BLD VENIPUNCTURE: CPT | Performed by: EMERGENCY MEDICINE

## 2025-04-04 PROCEDURE — 84145 PROCALCITONIN (PCT): CPT | Performed by: EMERGENCY MEDICINE

## 2025-04-04 PROCEDURE — 71045 X-RAY EXAM CHEST 1 VIEW: CPT

## 2025-04-04 PROCEDURE — 96366 THER/PROPH/DIAG IV INF ADDON: CPT

## 2025-04-04 PROCEDURE — 85025 COMPLETE CBC W/AUTO DIFF WBC: CPT | Performed by: EMERGENCY MEDICINE

## 2025-04-04 PROCEDURE — 96367 TX/PROPH/DG ADDL SEQ IV INF: CPT

## 2025-04-04 PROCEDURE — 82077 ASSAY SPEC XCP UR&BREATH IA: CPT | Performed by: EMERGENCY MEDICINE

## 2025-04-04 PROCEDURE — 80179 DRUG ASSAY SALICYLATE: CPT | Performed by: EMERGENCY MEDICINE

## 2025-04-04 PROCEDURE — 99285 EMERGENCY DEPT VISIT HI MDM: CPT

## 2025-04-04 PROCEDURE — 85610 PROTHROMBIN TIME: CPT | Performed by: EMERGENCY MEDICINE

## 2025-04-04 PROCEDURE — 96365 THER/PROPH/DIAG IV INF INIT: CPT

## 2025-04-04 PROCEDURE — 93005 ELECTROCARDIOGRAM TRACING: CPT

## 2025-04-04 PROCEDURE — 99223 1ST HOSP IP/OBS HIGH 75: CPT | Performed by: STUDENT IN AN ORGANIZED HEALTH CARE EDUCATION/TRAINING PROGRAM

## 2025-04-04 PROCEDURE — 93010 ELECTROCARDIOGRAM REPORT: CPT | Performed by: INTERNAL MEDICINE

## 2025-04-04 PROCEDURE — 99285 EMERGENCY DEPT VISIT HI MDM: CPT | Performed by: EMERGENCY MEDICINE

## 2025-04-04 PROCEDURE — 70450 CT HEAD/BRAIN W/O DYE: CPT

## 2025-04-04 PROCEDURE — 84484 ASSAY OF TROPONIN QUANT: CPT | Performed by: EMERGENCY MEDICINE

## 2025-04-04 PROCEDURE — 81001 URINALYSIS AUTO W/SCOPE: CPT | Performed by: EMERGENCY MEDICINE

## 2025-04-04 PROCEDURE — 82607 VITAMIN B-12: CPT | Performed by: PHYSICIAN ASSISTANT

## 2025-04-04 PROCEDURE — 80143 DRUG ASSAY ACETAMINOPHEN: CPT | Performed by: EMERGENCY MEDICINE

## 2025-04-04 PROCEDURE — 86376 MICROSOMAL ANTIBODY EACH: CPT | Performed by: PHYSICIAN ASSISTANT

## 2025-04-04 PROCEDURE — 87040 BLOOD CULTURE FOR BACTERIA: CPT | Performed by: EMERGENCY MEDICINE

## 2025-04-04 PROCEDURE — 96375 TX/PRO/DX INJ NEW DRUG ADDON: CPT

## 2025-04-04 PROCEDURE — 99255 IP/OBS CONSLTJ NEW/EST HI 80: CPT | Performed by: PSYCHIATRY & NEUROLOGY

## 2025-04-04 PROCEDURE — 83605 ASSAY OF LACTIC ACID: CPT | Performed by: EMERGENCY MEDICINE

## 2025-04-04 PROCEDURE — 80053 COMPREHEN METABOLIC PANEL: CPT | Performed by: EMERGENCY MEDICINE

## 2025-04-04 PROCEDURE — 82140 ASSAY OF AMMONIA: CPT | Performed by: EMERGENCY MEDICINE

## 2025-04-04 PROCEDURE — 83735 ASSAY OF MAGNESIUM: CPT | Performed by: EMERGENCY MEDICINE

## 2025-04-04 PROCEDURE — 85730 THROMBOPLASTIN TIME PARTIAL: CPT | Performed by: EMERGENCY MEDICINE

## 2025-04-04 PROCEDURE — 82746 ASSAY OF FOLIC ACID SERUM: CPT | Performed by: PHYSICIAN ASSISTANT

## 2025-04-04 RX ORDER — MAGNESIUM SULFATE HEPTAHYDRATE 40 MG/ML
2 INJECTION, SOLUTION INTRAVENOUS ONCE
Status: COMPLETED | OUTPATIENT
Start: 2025-04-04 | End: 2025-04-04

## 2025-04-04 RX ORDER — ONDANSETRON 2 MG/ML
4 INJECTION INTRAMUSCULAR; INTRAVENOUS EVERY 6 HOURS PRN
Status: DISCONTINUED | OUTPATIENT
Start: 2025-04-04 | End: 2025-04-05

## 2025-04-04 RX ORDER — SODIUM CHLORIDE, SODIUM GLUCONATE, SODIUM ACETATE, POTASSIUM CHLORIDE, MAGNESIUM CHLORIDE, SODIUM PHOSPHATE, DIBASIC, AND POTASSIUM PHOSPHATE .53; .5; .37; .037; .03; .012; .00082 G/100ML; G/100ML; G/100ML; G/100ML; G/100ML; G/100ML; G/100ML
75 INJECTION, SOLUTION INTRAVENOUS CONTINUOUS
Status: DISPENSED | OUTPATIENT
Start: 2025-04-04 | End: 2025-04-04

## 2025-04-04 RX ORDER — ACETAMINOPHEN 325 MG/1
650 TABLET ORAL EVERY 6 HOURS PRN
Status: DISCONTINUED | OUTPATIENT
Start: 2025-04-04 | End: 2025-04-08 | Stop reason: HOSPADM

## 2025-04-04 RX ORDER — SENNOSIDES 8.6 MG
1 TABLET ORAL
Status: DISCONTINUED | OUTPATIENT
Start: 2025-04-04 | End: 2025-04-08 | Stop reason: HOSPADM

## 2025-04-04 RX ORDER — LORAZEPAM 2 MG/ML
0.5 INJECTION INTRAMUSCULAR ONCE AS NEEDED
Status: COMPLETED | OUTPATIENT
Start: 2025-04-04 | End: 2025-04-05

## 2025-04-04 RX ORDER — ONDANSETRON 2 MG/ML
4 INJECTION INTRAMUSCULAR; INTRAVENOUS ONCE
Status: COMPLETED | OUTPATIENT
Start: 2025-04-04 | End: 2025-04-04

## 2025-04-04 RX ORDER — MAGNESIUM HYDROXIDE/ALUMINUM HYDROXICE/SIMETHICONE 120; 1200; 1200 MG/30ML; MG/30ML; MG/30ML
30 SUSPENSION ORAL EVERY 6 HOURS PRN
Status: DISCONTINUED | OUTPATIENT
Start: 2025-04-04 | End: 2025-04-08 | Stop reason: HOSPADM

## 2025-04-04 RX ADMIN — ONDANSETRON 4 MG: 2 INJECTION, SOLUTION INTRAMUSCULAR; INTRAVENOUS at 21:30

## 2025-04-04 RX ADMIN — FOLIC ACID 1 MG: 5 INJECTION, SOLUTION INTRAMUSCULAR; INTRAVENOUS; SUBCUTANEOUS at 02:08

## 2025-04-04 RX ADMIN — THIAMINE HYDROCHLORIDE 100 MG: 100 INJECTION, SOLUTION INTRAMUSCULAR; INTRAVENOUS at 02:11

## 2025-04-04 RX ADMIN — THIAMINE HYDROCHLORIDE 200 MG: 100 INJECTION, SOLUTION INTRAMUSCULAR; INTRAVENOUS at 22:35

## 2025-04-04 RX ADMIN — SODIUM CHLORIDE, SODIUM GLUCONATE, SODIUM ACETATE, POTASSIUM CHLORIDE, MAGNESIUM CHLORIDE, SODIUM PHOSPHATE, DIBASIC, AND POTASSIUM PHOSPHATE 75 ML/HR: .53; .5; .37; .037; .03; .012; .00082 INJECTION, SOLUTION INTRAVENOUS at 10:16

## 2025-04-04 RX ADMIN — THIAMINE HYDROCHLORIDE 100 MG: 100 INJECTION, SOLUTION INTRAMUSCULAR; INTRAVENOUS at 04:51

## 2025-04-04 RX ADMIN — ONDANSETRON 4 MG: 2 INJECTION, SOLUTION INTRAMUSCULAR; INTRAVENOUS at 03:38

## 2025-04-04 RX ADMIN — THIAMINE HYDROCHLORIDE 200 MG: 100 INJECTION, SOLUTION INTRAMUSCULAR; INTRAVENOUS at 18:18

## 2025-04-04 RX ADMIN — MAGNESIUM SULFATE HEPTAHYDRATE 2 G: 40 INJECTION, SOLUTION INTRAVENOUS at 03:38

## 2025-04-04 NOTE — ASSESSMENT & PLAN NOTE
History of alcoholism in the past - approximately 7 beers daily   Reports that she last drink alcohol a few months ago - daughter also reports last drink was 2 months ago   No signs of alcohol withdrawal on admission  Concern for [place] encephalopathy as above

## 2025-04-04 NOTE — ED PROVIDER NOTES
Time reflects when diagnosis was documented in both MDM as applicable and the Disposition within this note       Time User Action Codes Description Comment    4/4/2025  2:45 AM Marni Beavers Add [R41.82] Altered mental status     4/4/2025  2:45 AM Marni Beavers Add [R79.0] Low magnesium level           ED Disposition       ED Disposition   Admit    Condition   Stable    Date/Time   Fri Apr 4, 2025  4:14 AM    Comment   Case was discussed with SLIM and the patient's admission status was agreed to be Admission Status: inpatient status to the service of Dr. Cotto .               Assessment & Plan       Medical Decision Making  59-year-old female with altered mental status, no focal findings on examination doubt acute intracranial pathology such as bleeding, mass lesion, differential diagnosis includes intoxication, Warnicke Korsakoff's, electrolyte abnormality/hyponatremia and EKG to evaluate for arrhythmia lab work to evaluate for electrolyte normalities dehydration, thyroid function to evaluate for thyroid dysfunction, will obtain ammonia levels to evaluate for hepatic encephalopathy, history of alcohol use, c pain coagulation factors to evaluate for coagulopathy liver dysfunction    Amount and/or Complexity of Data Reviewed  Labs: ordered.  Radiology: ordered.    Risk  Prescription drug management.  Decision regarding hospitalization.        ED Course as of 04/04/25 0414   Fri Apr 04, 2025   0144 CT abdomen from 2 days ago with significant urinary retention , patient received angelo catheter during evaluation which is draining  urine   0201 Procedure Note: EKG  Date/Time: 04/04/25 2:01 AM   Performed by: MARNI BEAVERS  Authorized by: MARNI BEAVERS  Indications / Diagnosis: CP  ECG reviewed by me, the ED Provider: yes   The EKG demonstrates:  Rhythm: normal sinus  Intervals: normal intervals  Axis: normal axis  QRS/Blocks: normal QRS  ST Changes: No acute ST Changes, no STD/LAN.       0348 Still confused, otherwise nonfocal  exam CT head negative for acute etiology,, panel negative, sodium only minimally low with otherwise normal workup, will admit for further evaluation and care       Medications   magnesium sulfate 2 g/50 mL IVPB (premix) 2 g (2 g Intravenous New Bag 4/4/25 0338)   folic acid 1 mg in sodium chloride 0.9 % 50 mL IVPB (0 mg Intravenous Stopped 4/4/25 0339)   thiamine (VITAMIN B1) 100 mg in sodium chloride 0.9 % 50 mL IVPB (0 mg Intravenous Stopped 4/4/25 0339)   ondansetron (ZOFRAN) injection 4 mg (4 mg Intravenous Given 4/4/25 0338)       ED Risk Strat Scores                            SBIRT 20yo+      Flowsheet Row Most Recent Value   Initial Alcohol Screen: US AUDIT-C     1. How often do you have a drink containing alcohol? 0 Filed at: 04/04/2025 0127   2. How many drinks containing alcohol do you have on a typical day you are drinking?  0 Filed at: 04/04/2025 0127   3a. Male UNDER 65: How often do you have five or more drinks on one occasion? 0 Filed at: 04/04/2025 0127   3b. FEMALE Any Age, or MALE 65+: How often do you have 4 or more drinks on one occassion? 0 Filed at: 04/04/2025 0127   Audit-C Score 0 Filed at: 04/04/2025 0127   ROZINA: How many times in the past year have you...    Used an illegal drug or used a prescription medication for non-medical reasons? Never Filed at: 04/04/2025 0127                            History of Present Illness       Chief Complaint   Patient presents with    Altered Mental Status     Pt coming in via EMS for AMS, Family told [facility] that the pt has been acting more confused erratic for the past 2 days. Pt uncooperative, cursing at staff during triage       Past Medical History:   Diagnosis Date    Alcoholism (HCC)     Depression     Hypothyroidism     PTSD (post-traumatic stress disorder)       Past Surgical History:   Procedure Laterality Date    COLOSTOMY      CYSTOSCOPY W/ URETERAL STENT PLACEMENT Left     EXPLORATORY LAPAROTOMY      SALPINGECTOMY Bilateral 02/2025      Family  "History   Problem Relation Age of Onset    Heart disease Mother     Stroke Mother     Hypertension Mother     Kidney disease Mother     No Known Problems Father     No Known Problems Sister     No Known Problems Brother       Social History     Tobacco Use    Smoking status: Every Day     Types: Cigarettes    Smokeless tobacco: Current   Vaping Use    Vaping status: Never Used   Substance Use Topics    Alcohol use: Yes     Comment: drinks beer    Drug use: Not Currently     Comment: Denies use      E-Cigarette/Vaping    E-Cigarette Use Never User       E-Cigarette/Vaping Substances      I have reviewed and agree with the history as documented.     59-year-old female with colostomy, recent visit for urinary retention and hyponatremia presents for evaluation of altered mental status.  Patient's family reports she has been acting off and was more confused over the last 2 days, no recent falls, she answers questions but is intermittently verbally aggressive, not physically aggressive able to be redirected.  Patient states that she has \"sepsis \"from her poop bag in her urine bag but is unable to give me any other specifics, no recent falls, she told the staff that she drinks daily but told me that she has not drank alcohol in 40 years.  Currently no focal findings on examination in no acute distress        Review of Systems   Unable to perform ROS: Mental status change           Objective       ED Triage Vitals   Temperature Pulse Blood Pressure Respirations SpO2 Patient Position - Orthostatic VS   04/04/25 0140 04/04/25 0140 04/04/25 0142 04/04/25 0141 04/04/25 0140 --   98.6 °F (37 °C) 85 129/73 18 94 %       Temp Source Heart Rate Source BP Location FiO2 (%) Pain Score    04/04/25 0140 -- -- -- --    Temporal          Vitals      Date and Time Temp Pulse SpO2 Resp BP Pain Score FACES Pain Rating User   04/04/25 0142 -- -- -- -- 129/73 -- -- KK   04/04/25 0141 -- -- -- 18 -- -- -- KK   04/04/25 0140 98.6 °F (37 °C) 85 " 94 % -- -- -- -- KK            Physical Exam  Vitals and nursing note reviewed.   Constitutional:       Appearance: She is well-developed.      Comments: Cachectic appearing female in no acute respiratory distress   HENT:      Head: Normocephalic and atraumatic.   Eyes:      Extraocular Movements: Extraocular movements intact.   Cardiovascular:      Rate and Rhythm: Normal rate and regular rhythm.      Heart sounds: No murmur heard.     No friction rub. No gallop.   Pulmonary:      Effort: Pulmonary effort is normal.      Breath sounds: No wheezing or rales.   Chest:      Chest wall: No tenderness.   Abdominal:      General: There is no distension.      Palpations: Abdomen is soft. There is no mass.      Tenderness: There is no guarding or rebound.      Comments: Colostomy bag with colostomy site pink and functional, brown stool in colostomy bag no surrounding signs of erythema   Musculoskeletal:         General: No swelling. Normal range of motion.   Skin:     General: Skin is warm and dry.   Neurological:      Mental Status: She is alert.      GCS: GCS eye subscore is 4. GCS verbal subscore is 4. GCS motor subscore is 6.         Results Reviewed       Procedure Component Value Units Date/Time    Comprehensive metabolic panel [[id number]]  (Abnormal) Collected: 04/04/25 0145    Lab Status: Final result Specimen: Blood from Arm, Left Updated: 04/04/25 0236     Sodium 134 mmol/L      Potassium 4.5 mmol/L      Chloride 96 mmol/L      CO2 29 mmol/L      ANION GAP 9 mmol/L      BUN 6 mg/dL      Creatinine 0.45 mg/dL      Glucose 98 mg/dL      Calcium 9.7 mg/dL      AST 18 U/L      ALT 12 U/L      Alkaline Phosphatase 79 U/L      Total Protein 7.2 g/dL      Albumin 4.3 g/dL      Total Bilirubin 0.88 mg/dL      eGFR 109 ml/min/1.73sq m     Narrative:      National Kidney Disease Foundation guidelines for Chronic Kidney Disease (CKD):     Stage 1 with normal or high GFR (GFR > 90 mL/min/1.73 square meters)    Stage 2 Mild  CKD (GFR = 60-89 mL/min/1.73 square meters)    Stage 3A Moderate CKD (GFR = 45-59 mL/min/1.73 square meters)    Stage 3B Moderate CKD (GFR = 30-44 mL/min/1.73 square meters)    Stage 4 Severe CKD (GFR = 15-29 mL/min/1.73 square meters)    Stage 5 End Stage CKD (GFR <15 mL/min/1.73 square meters)  Note: GFR calculation is accurate only with a steady state creatinine    Magnesium [[id number]]  (Abnormal) Collected: 04/04/25 0145    Lab Status: Final result Specimen: Blood from Arm, Left Updated: 04/04/25 0236     Magnesium 1.8 mg/dL     Salicylate level [[id number]]  (Normal) Collected: 04/04/25 0145    Lab Status: Final result Specimen: Blood from Arm, Left Updated: 04/04/25 0236     Salicylate Lvl <5 mg/dL     Acetaminophen level-If concentration is detectable, please discuss with medical  on call. [[id number]]  (Abnormal) Collected: 04/04/25 0145    Lab Status: Final result Specimen: Blood from Arm, Left Updated: 04/04/25 0236     Acetaminophen Level <2 ug/mL     TSH, 3rd generation with Free T4 reflex [[id number]]  (Normal) Collected: 04/02/25 1134    Lab Status: Final result Specimen: Blood from Arm, Left Updated: 04/04/25 0234     TSH 3RD GENERATON 4.039 uIU/mL     Procalcitonin [[id number]]  (Normal) Collected: 04/04/25 0145    Lab Status: Final result Specimen: Blood from Arm, Left Updated: 04/04/25 0224     Procalcitonin <0.05 ng/ml     HS Troponin 0hr (reflex protocol) [[id number]]  (Normal) Collected: 04/04/25 0145    Lab Status: Final result Specimen: Blood from Arm, Right Updated: 04/04/25 0221     hs TnI 0hr <2 ng/L     Urine Microscopic [[id number]]  (Abnormal) Collected: 04/04/25 0154    Lab Status: Final result Specimen: Urine, Indwelling Dietrich Catheter Updated: 04/04/25 0217     RBC, UA 1-2 /hpf      WBC, UA 4-10 /hpf      Epithelial Cells Occasional /hpf      Bacteria, UA Occasional /hpf      Amorphous Crystals, UA Occasional    Protime-INR [[id number]]  (Normal) Collected: 04/04/25 0145    Lab  Status: Final result Specimen: Blood from Arm, Right Updated: 04/04/25 0214     Protime 13.4 seconds      INR 0.97    Narrative:      INR Therapeutic Range    Indication                                             INR Range      Atrial Fibrillation                                               2.0-3.0  Hypercoagulable State                                    2.0.2.3  Left Ventricular Asist Device                            2.0-3.0  Mechanical Heart Valve                                  -    Aortic(with afib, MI, embolism, HF, LA enlargement,    and/or coagulopathy)                                     2.0-3.0 (2.5-3.5)     Mitral                                                             2.5-3.5  Prosthetic/Bioprosthetic Heart Valve               2.0-3.0  Venous thromboembolism (VTE: VT, PE        2.0-3.0    APTT [[id number]]  (Normal) Collected: 04/04/25 0145    Lab Status: Final result Specimen: Blood from Arm, Right Updated: 04/04/25 0214     PTT 34 seconds     Lactic acid [[id number]]  (Normal) Collected: 04/04/25 0145    Lab Status: Final result Specimen: Blood from Arm, Right Updated: 04/04/25 0212     LACTIC ACID 0.6 mmol/L     Narrative:      Result may be elevated if tourniquet was used during collection.    Ammonia [[id number]]  (Normal) Collected: 04/04/25 0145    Lab Status: Final result Specimen: Blood from Arm, Left Updated: 04/04/25 0212     Ammonia 38 umol/L     Ethanol [[id number]]  (Normal) Collected: 04/04/25 0145    Lab Status: Final result Specimen: Blood from Arm, Right Updated: 04/04/25 0212     Ethanol Lvl <10 mg/dL     UA w Reflex to Microscopic w Reflex to Culture [[id number]]  (Abnormal) Collected: 04/04/25 0154    Lab Status: Final result Specimen: Urine, Indwelling Dietrich Catheter Updated: 04/04/25 0210     Color, UA Yellow     Clarity, UA Clear     Specific Gravity, UA 1.020     pH, UA 6.5     Leukocytes, UA Moderate     Nitrite, UA Negative     Protein, UA Trace mg/dl      Glucose, UA  Negative mg/dl      Ketones, UA 10 (1+) mg/dl      Urobilinogen, UA <2.0 mg/dl      Bilirubin, UA Negative     Occult Blood, UA Trace    CBC and differential [[id number]] Collected: 04/04/25 0145    Lab Status: Final result Specimen: Blood from Arm, Left Updated: 04/04/25 0159     WBC 7.80 Thousand/uL      RBC 4.80 Million/uL      Hemoglobin 15.0 g/dL      Hematocrit 45.3 %      MCV 94 fL      MCH 31.3 pg      MCHC 33.1 g/dL      RDW 12.9 %      MPV 9.8 fL      Platelets 314 Thousands/uL      nRBC 0 /100 WBCs      Segmented % 56 %      Immature Grans % 1 %      Lymphocytes % 32 %      Monocytes % 6 %      Eosinophils Relative 4 %      Basophils Relative 1 %      Absolute Neutrophils 4.37 Thousands/µL      Absolute Immature Grans 0.05 Thousand/uL      Absolute Lymphocytes 2.50 Thousands/µL      Absolute Monocytes 0.50 Thousand/µL      Eosinophils Absolute 0.28 Thousand/µL      Basophils Absolute 0.10 Thousands/µL     Blood culture #1 [[id number]] Collected: 04/04/25 0145    Lab Status: In process Specimen: Blood from Arm, Right Updated: 04/04/25 0156    Blood culture #2 [[id number]] Collected: 04/04/25 0145    Lab Status: In process Specimen: Blood from Arm, Left Updated: 04/04/25 0156            CT head without contrast   Final Interpretation by Nadege Gamez MD (04/04 0255)      No acute intracranial abnormality.                  Workstation performed: [id number]         XR chest portable    (Results Pending)       Procedures    ED Medication and Procedure Management   Prior to Admission Medications   Prescriptions Last Dose Informant Patient Reported? Taking?   Ostomy Supplies (Premier Colostomy/Ileostomy) KIT   No No   Sig: by Does not apply route daily   docusate sodium (COLACE) 100 mg capsule   No No   Sig: Take 1 capsule (100 mg total) by mouth every 12 (twelve) hours      Facility-Administered Medications: None     Patient's Medications   Discharge Prescriptions    No medications on file     No discharge  procedures on file.  ED SEPSIS DOCUMENTATION   Time reflects when diagnosis was documented in both MDM as applicable and the Disposition within this note       Time User Action Codes Description Comment    4/4/2025  2:45 AM Marivel Beavers [R41.82] Altered mental status     4/4/2025  2:45 AM Marivel Beavers [R79.0] Low magnesium level                  Marivel Beavers DO  04/04/25 0414

## 2025-04-04 NOTE — ASSESSMENT & PLAN NOTE
Presenting to the ED with increasing confusion and erratic behavior per family  On review of ED documentation from 4/2 appeared to have intermittent confusion and abnormal responses then as well  History of alcoholism in the past  Patient does have significant confabulations at time of admission, otherwise is oriented to self, place, month, and corrected herself on the year  Workup relatively unrevealing in the ED including labs and CTh  Concern for Wernicke encephalopathy-high-dose thiamine initiated at time of admit  Neurology consulted  Daughter had expressed concern about ability for the patient to stay at home-CM consulted for possible placement

## 2025-04-04 NOTE — CASE MANAGEMENT
Case Management Assessment & Discharge Planning Note    Patient name Berta Smart  Location ED TR13/TR13A MRN [id number]  : 1965 Date 2025       Current Admission Date: 2025  Current Admission Diagnosis:Encephalopathy   Patient Active Problem List    Diagnosis Date Noted Date Diagnosed    Encephalopathy 2025     Urinary retention 2025     Colostomy in place (HCC) 2025     SOB (shortness of breath) 2020     Abnormal CXR 2020     Colostomy status (HCC) 2020     KRAIG (generalized anxiety disorder) 2020     Current mild episode of major depressive disorder without prior episode (HCC) 2020     Tobacco use disorder 2020     Alcoholism (HCC) 2020     Body mass index (BMI) 19.9 or less, adult 2020       LOS (days): 0  Geometric Mean LOS (GMLOS) (days):   Days to GMLOS:     OBJECTIVE:    Risk of Unplanned Readmission Score: 7.23     Current admission status: Inpatient    Preferred Pharmacy:   [facility] [address] [place] [id number]  Phone: [phone] Fax: [phone]    Primary Care Provider: Sheri Stephens DO    Primary Insurance:   Secondary Insurance:     ASSESSMENT:  Active Health Care Proxies    There are no active Health Care Proxies on file.       Advance Directives  Does patient have a Health Care POA?: No  Does patient have Advance Directives?: No  Primary Contact: Bonilla Smart: : [phone]    Readmission Root Cause  30 Day Readmission: No    Patient Information  Admitted from:: Home  Mental Status: Alert, Confused  During Assessment patient was accompanied by: Spouse, Daughter  Assessment information provided by:: Spouse  Primary Caregiver: Self  County of Residence: [place]  What [facility] do you live in?: [place]  Type of Current Residence: 2 [place] home  Upon entering residence, is there a bedroom on the main floor (no further steps)?: No  A  bedroom is located on the following floor levels of residence (select all that apply):: 2nd Floor  Upon entering residence, is there a bathroom on the main floor (no further steps)?: No  Indicate which floors of current residence have a bathroom (select all the apply):: 2nd Floor  Number of steps to 2nd floor from main floor: One Flight  Living Arrangements: Lives w/ Spouse/significant other, Lives w/ Daughter  Is patient a ?: No    Activities of Daily Living Prior to Admission  Functional Status: Independent  Completes ADLs independently?: Yes  Ambulates independently?: Yes  Does patient use assisted devices?: No  Does patient currently own DME?: No  Does patient have a history of Outpatient Therapy (PT/OT)?: No  Does the patient have a history of Short-Term Rehab?: No  Does patient have a history of HHC?: No  Does patient currently have HHC?: No    Patient Information Continued  Income Source: Unemployed  Does patient have prescription coverage?: No  Can the patient afford their medications and any related supplies (such as glucometers or test strips)?:  (unknown)  Does patient receive dialysis treatments?: No  Does patient have a history of substance abuse?: Yes  Historical substance use preference: Alcohol/ETOH  Is patient currently in treatment for substance abuse?: N/A - sober  Does patient have a history of Mental Health Diagnosis?: Yes  Has patient received inpatient treatment related to mental health in the last 2 years?: No    Means of Transportation  Means of Transport to Appts:: Family transport    DISCHARGE DETAILS:    Discharge planning discussed with:: patient's   Freedom of Choice: Yes  Comments - Freedom of Choice: Unsure of discharge needs- plan for home, no PCP and no insurance- referral made to [facility] contacted family/caregiver?: Yes (Pt's  and dtr at bedside)    Contacts  Patient Contacts: Bonilla Tiffanyselina: : [phone]  Relationship to Patient:: Family  Contact  Method: In Person  Reason/Outcome: Emergency Contact, Discharge Planning    Additional Comments: Pt alert but confused. Pt's  and dtr at bedside. Discussed role of case management. Per Pt's , Pt lives with him and dtr in 2sh, independent PTA. Pt has no DME. Pt was drinking cases of [employer] until 2 months ago and not currently drinking. Pt's  reports Pt was in alcohol rehab 3 or 4 times 30 years ago. Pt had MH inpt tx 20 years ago. Pt's  reports Pt refuses to see a doctor nad has not seen one in 20 years. Pt's  reports Pt has no insurance but in process for SSD. Denies hx of VNA/SNF. Pt's  in agreement for referral to [facility] and agreeable for referral to internal medicine for new PCP. Referral sent to [facility]. CM to follow.

## 2025-04-04 NOTE — CONSULTS
"Consultation - Neurology   Name: Berta Smart 59 y.o. female I MRN: [id number]  Unit/Bed#: TR13A I Date of Admission: 4/4/2025   Date of Service: 4/4/2025 I Hospital Day: 0   Inpatient consult to Neurology  Consult performed by: Rustam Blackwell MD  Consult ordered by: Ayesha Lau PA-C        Physician Requesting Evaluation: Swetha Baker MD   Reason for Evaluation / Principal Problem: Encephalopathy    Assessment & Plan  Encephalopathy  59 y.o.  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     Workup  - CTH wo contrast 4/4/25:   \"No acute intracranial abnormality.\"     Higher suspicion for psychiatric illness as pt with decline since she stopped drinking alcohol, which may have enhanced her life stressors; however, will do further neurology workup to rule-out neurologic pathology    Plan  - MRI brain wwo contrast ordered  - Labs ordered: anti-microsomal antibody, serum autoimmune encephalopathy profile, vitamin B12, vitamin B1, vitamin B6  - recommend psychiatry evaluation   - Medical management and correction of metabolic and infectious disturbances per primary team   - Avoid CNS altering medications if possible  - Continue supportive care   - Continue to monitor neurologic status. Notify neurology with any changes in exam.     Colostomy status (HCC)  - management per primary team   Alcoholism (HCC)  - vitamin B1 pending   - continue thiamine supplementation   - encourage continued alcohol cessation  Urinary retention  - catheter in place  - management per primary team   **History and physical examination obtained by attending neurologist. AP in room as witness to history and physical examination obtained and acted solely as a scribe for attending neurologist. This AP did not provide any decision making for this encounter.**      Recommendations for outpatient neurological follow up have yet to be determined.    History of " "Present Illness   Berta Smart is a 59 y.o.  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     Per ED notes: Pt's family reports that patient has been acting \"off\" and was more confused over the past 2 days.  Patient reportedly answers questions but is intermittently verbally aggressive.  UA positive for leukocytes, negative for nitrates, 4-10 WBC.    Patient's daughter provides majority of history as patient's reliability as a historian is unclear at this point.  Per patient's daughter:  Patient for the past year has \"given up,\" noting that she is frustrated with her , her daughter, and her health (particularly having her ostomy bag).  For the past year, patient usually gets up, smokes a cigarette, drinks coffee, and \"hangs out.\"  Patient in general struggles with self-care as patient's daughter notes patient does not shower, does not brush her teeth, and does not brush her hair.  Prior to 2 months ago, patient was drinking alcohol every day.  2 months ago, patient quit drinking alcohol cold turkey.  Over the past 2 months, patient has had a lot of mood swings, let more anger, and has been more combative.  Patient's daughter notes that patient's personality traits have been in since she quit drinking as she is always had a lot of mood swings but these are more pronounced over the past 2 months since the patient quit drinking.  Patient with particularly pronounced encephalopathy over the past 3 days.  Patient has been trying to work out at home but does odd workouts; for instance, patient hangs from the door frame and bicycles her feet in the air.  Patient reports she is trying to work out because she is trying to gain weight.  Patient woke her daughter up at 4 AM one morning saying that there was someone there, but no one was in fact present.  Patient similarly started screaming for help at 11 PM last night.  Patient was " "screaming for help because she thought her \"organs were feeling.\"  Patient has been crying a lot.  Patient's daughter notes that patient has had more difficulty putting together concepts.  For example, when patient cell phone stopped working, patient's daughter got her new phone and tried to show her how to use it, patient with difficulty understanding the concept of using the new phone.  Patient reportedly has a seizure disorder that has been worked up in the past.  Patient previously followed with [facility], Dr. Graff.  Patient was on Dilantin in the past for seizures but has been off the medication for 10-20 years.  Patient manages her seizures by being in a safe place.  Patient states she wakes up with tongue bites and lip bites.  Patient states she has full teeth in the past.  Patient's  reports patient is not incontinent of urine overnight, though patient and patient's  have not slept in the same bed for about 10 years.  Unsure of last seizure.  Patient with poor sleep schedule noting that she sleeps only 2 hours at best at night.  Patient has not had restful sleep for about 10 years.  Patient's daughter notes patient's encephalopathy comes and goes.  Patient had an episode of shahida about 5 years ago.  Patient does not see doctors regularly.  Patient lives at home with her daughter and her .    When patient was asked how she is feeling today, patient states \"it is a beautiful day.  Nothing is bothering me\" and the proceeds to roll her eyes.  Patient raises the middle finger to family members and providers periodically throughout examination.  Patient reports she has pain in her stomach.  Patient reports when she tries to move too much, she just collapses noting that her arms and legs \"give out.\"        Review of Systems   Neurological:  Negative for headaches.        Historical Information   Past Medical History:   Diagnosis Date    Alcoholism (HCC)     Depression     Hypothyroidism  "    PTSD (post-traumatic stress disorder)      Past Surgical History:   Procedure Laterality Date    COLOSTOMY      CYSTOSCOPY W/ URETERAL STENT PLACEMENT Left     EXPLORATORY LAPAROTOMY      SALPINGECTOMY Bilateral 02/2025     Social History     Tobacco Use    Smoking status: Every Day     Types: Cigarettes    Smokeless tobacco: Current   Vaping Use    Vaping status: Never Used   Substance and Sexual Activity    Alcohol use: Yes     Comment: drinks beer    Drug use: Not Currently     Comment: Denies use    Sexual activity: Not Currently     E-Cigarette/Vaping    E-Cigarette Use Never User      E-Cigarette/Vaping Substances     Family History   Problem Relation Age of Onset    Heart disease Mother     Stroke Mother     Hypertension Mother     Kidney disease Mother     No Known Problems Father     No Known Problems Sister     No Known Problems Brother      Social History     Tobacco Use    Smoking status: Every Day     Types: Cigarettes    Smokeless tobacco: Current   Vaping Use    Vaping status: Never Used   Substance and Sexual Activity    Alcohol use: Yes     Comment: drinks beer    Drug use: Not Currently     Comment: Denies use    Sexual activity: Not Currently       Current Facility-Administered Medications:     acetaminophen (TYLENOL) tablet 650 mg, Q6H PRN    aluminum-magnesium hydroxide-simethicone (MAALOX) oral suspension 30 mL, Q6H PRN    LORazepam (ATIVAN) injection 0.5 mg, Once PRN **OR** LORazepam (ATIVAN) injection 0.5 mg, Once PRN    multi-electrolyte (Plasmalyte-A/Isolyte-S PH 7.4/Normosol-R) IV solution, Continuous, Last Rate: 75 mL/hr (04/04/25 1016)    ondansetron (ZOFRAN) injection 4 mg, Q6H PRN    senna (SENOKOT) tablet 8.6 mg, HS PRN    thiamine (VITAMIN B1) 200 mg in sodium chloride 0.9 % 50 mL IVPB, TID **FOLLOWED BY** [START ON 4/12/2025] thiamine (VITAMIN B1) 250 mg in sodium chloride 0.9 % 50 mL IVPB, Daily  Prior to Admission Medications   Prescriptions Last Dose Informant Patient  "Reported? Taking?   Ostomy Supplies (Premier Colostomy/Ileostomy) KIT   No No   Sig: by Does not apply route daily   docusate sodium (COLACE) 100 mg capsule   No No   Sig: Take 1 capsule (100 mg total) by mouth every 12 (twelve) hours      Facility-Administered Medications: None     Patient has no known allergies.    Objective :  Temp:  [98.6 °F (37 °C)] 98.6 °F (37 °C)  HR:  [75-85] 75  BP: (121-129)/(73-79) 121/79  Resp:  [18-22] 22  SpO2:  [94 %] 94 %  O2 Device: None (Room air)    Physical Exam  Constitutional:       General: She is awake.   Eyes:      Extraocular Movements: Extraocular movements intact.   Cardiovascular:      Rate and Rhythm: Normal rate.   Pulmonary:      Effort: Pulmonary effort is normal.   Neurological:      Mental Status: She is alert.      Cranial Nerves: No dysarthria.      Deep Tendon Reflexes:      Reflex Scores:       Tricep reflexes are 2+ on the right side and 2+ on the left side.       Bicep reflexes are 2+ on the right side and 2+ on the left side.       Brachioradialis reflexes are 2+ on the right side and 2+ on the left side.       Patellar reflexes are 2+ on the right side and 2+ on the left side.    Neurological Exam  Mental Status  Awake and alert. no dysarthria present. Follows three-step commands. Language: Speech fluent. Thought content not always appropriate with conversation . Attention and concentration: Attends to provider .    - able to correctly state    - oriented to place ([facility]) but not specific location, states it is [place]   - when first attempting to do strength testing, pt does her own \"routine\" stating she knows what to do and proceeds to to finger to nose testing, circumduction of shoulders on her own without instruction   - oriented to current month, day, then initially states year is \"26,\" then self corrects to \"25\"  - able to calculate number of quarters in $1.75  - able to spell WORLD forwards and backwards.    Cranial Nerves  CN II: Right " visual acuity: Finger movement. Left visual acuity: Finger movement. Right normal visual field. Left normal visual field.  CN III, IV, VI: Extraocular movements intact bilaterally.   Right pupil: 4 mm. Round. Reactive to light.   Left pupil: 4 mm. Round. Reactive to light.  CN V: Facial sensation is normal.  CN VII: Full and symmetric facial movement.  CN XII: Tongue midline without atrophy or fasciculations.    - audition intact to finger rub bilaterally.    Motor  Decreased muscle bulk throughout. Normal muscle tone.                                               Right                     Left   Shoulder abduction               5                          5  Elbow flexion                         5                          5  Elbow extension                    5                          5  Finger flexion                         5                          5  Hip flexion                              5                           Knee flexion                           5                           Knee extension                      5                           Plantarflexion                         5                           Dorsiflexion                            5                          5    - LLE is last of strength to confrontation testing, able to initially lift up against gravity but when examiner provided resistance, pt states she cannot keep leg up and then does not lift LLE or RLE for remainder of strength to confrontation examination. However, pt was able to easily perform heel to shin testing later in exam .    Sensory  Light touch is normal in upper and lower extremities.     Reflexes                                            Right                      Left  Brachioradialis                    2+                         2+  Biceps                                 2+                         2+  Triceps                                2+                         2+  Patellar                                2+                          2+    - when attempting to test reflexes, jerks body, even if reflex hammer not making contact with skin   - when checking L patellar reflex, jerks RLE off of bed abruptly .    Coordination  Right: Finger-to-nose normal. Heel-to-shin normal.Left: Finger-to-nose normal. Heel-to-shin normal.        Lab Results: attending neurologist reviewed the following results:CBC:   Results from last 7 days   Lab Units 04/04/25  0145 04/02/25  1134   WBC Thousand/uL 7.80 8.29   RBC Million/uL 4.80 4.90   HEMOGLOBIN g/dL 15.0 15.4   HEMATOCRIT % 45.3 45.7   MCV fL 94 93   PLATELETS Thousands/uL 314 333   , BMP/CMP:   Results from last 7 days   Lab Units 04/04/25  0145 04/02/25  1134   SODIUM mmol/L 134* 130*   POTASSIUM mmol/L 4.5 3.9   CHLORIDE mmol/L 96 93*   CO2 mmol/L 29 30   BUN mg/dL 6 5   CREATININE mg/dL 0.45* 0.40*   CALCIUM mg/dL 9.7 9.7   AST U/L 18 15   ALT U/L 12 12   ALK PHOS U/L 79 83   EGFR ml/min/1.73sq m 109 113   , Vitamin B12:   , HgBA1C:   , TSH:   Results from last 7 days   Lab Units 04/02/25  1134   TSH 3RD GENERATON uIU/mL 4.039   , Coagulation:   Results from last 7 days   Lab Units 04/04/25  0145   INR  0.97   , Lipid Profile:   , Ammonia:   Results from last 7 days   Lab Units 04/04/25  0145   AMMONIA umol/L 38   , Urinalysis:   Results from last 7 days   Lab Units 04/04/25  0154 04/02/25  1420   COLOR UA  Yellow Light Yellow   CLARITY UA  Clear Clear   SPEC GRAV UA  1.020 <1.005*   PH UA  6.5 7.0   LEUKOCYTES UA  Moderate* Negative   NITRITE UA  Negative Negative   GLUCOSE UA mg/dl Negative Negative   KETONES UA mg/dl 10 (1+)* 10 (1+)*   BILIRUBIN UA  Negative Negative   BLOOD UA  Trace* Negative   , Drug Screen:   Results from last 7 days   Lab Units 04/02/25  1420   BARBITURATE UR  Negative   BENZODIAZEPINE UR  Negative   THC UR  Negative   COCAINE UR  Negative   METHADONE URINE  Negative   OPIATE UR  Negative   PCP UR  Negative   , Medication Drug Levels:       Invalid input(s):  "\"CARBAMAZEPINE\", \"OXCARBAZEPINE\"  Recent Labs     04/04/25  0145   WBC 7.80   HGB 15.0   HCT 45.3      SODIUM 134*   K 4.5   CL 96   CO2 29   BUN 6   CREATININE 0.45*   GLUC 98   MG 1.8*     Attending neurologist personally reviewed CT head without contrast in PACS.      VTE Prophylaxis: Activity as tolerated     This note was completed in part utilizing Dragon Software.  Grammatical errors, random word insertions, spelling mistakes, and incomplete sentences may be an occasional consequence of this system secondary to software limitations, ambient noise, and hardware issues.  If you have any questions or concerns about the content, text, or information contained within the body of this dictation, please contact the provider for clarification.       "

## 2025-04-04 NOTE — H&P
"H&P - Hospitalist   Name: Berta Smart 59 y.o. female I MRN: [id number]  Unit/Bed#: TR13A I Date of Admission: 4/4/2025   Date of Service: 4/4/2025 I Hospital Day: 0     Assessment & Plan  Encephalopathy  Presenting to the ED with increasing confusion and erratic behavior per family  On review of ED documentation from 4/2 appeared to have intermittent confusion and abnormal responses then as well  History of alcoholism in the past  Patient does have significant confabulations at time of admission, otherwise is oriented to self, place, month, and corrected herself on the year  Workup relatively unrevealing in the ED including labs and CTh  Concern for Wernicke encephalopathy-high-dose thiamine initiated at time of admit  Neurology consulted  Daughter had expressed concern about ability for the patient to stay at home-CM consulted for possible placement  Urinary retention  Found to have urinary retention during recent ED visit on 4/2  Continue Dietrich  Alcoholism ([facility])  History of alcoholism in the past - approximately 7 beers daily   Reports that she last drink alcohol a few months ago - daughter also reports last drink was 2 months ago   No signs of alcohol withdrawal on admission  Concern for Warnicke encephalopathy as above  Colostomy status ([facility])  S/p colon resection 15 years ago   Normal brown stool in colostomy bag  Daily colostomy care      VTE Pharmacologic Prophylaxis: VTE Score: 1 ambulate patient  Code Status: Level 1 - Full Code   Discussion with family: Updated  (daughter) via phone.    Anticipated Length of Stay: Patient will be admitted on an inpatient basis with an anticipated length of stay of greater than 2 midnights secondary to encephalopathy.    History of Present Illness   Chief Complaint: \"she was acting erratic\"    Berta Smart is a 59 y.o. female with a PMH of colon resection with colostomy and alcohol abuse who presents with confusion and erratic behavior that onset 2 days per " daughter.  Daughter reports that her mother started acting very childish and became upset about very simple things.  Approximately 3 to 4 weeks ago she noticed significant abnormal behavior in her mother.  She describes this as lifting her legs up and bicycling in the air and grabbing on to a door saying she was working out.  Patient's sister also noted confusion and abnormal things per daughter.  Daughter reports patient stopped drinking 2 months ago.  She was a heavy alcoholic with at least 7 beers daily for several years.  Patient denies any other acute symptoms and daughter has not noted any other acute symptoms other than those discussed above.    Review of Systems   Constitutional:  Negative for chills and fever.   HENT:  Negative for congestion.    Respiratory:  Negative for cough and shortness of breath.    Cardiovascular:  Negative for chest pain and leg swelling.   Gastrointestinal:  Negative for abdominal pain, constipation, diarrhea, nausea and vomiting.   Genitourinary:  Negative for difficulty urinating, dysuria and hematuria.   Musculoskeletal:  Negative for gait problem.   Psychiatric/Behavioral:  Positive for agitation, behavioral problems and confusion.    All other systems reviewed and are negative.      Historical Information   Past Medical History:   Diagnosis Date    Alcoholism (HCC)     Depression     Hypothyroidism     PTSD (post-traumatic stress disorder)      Past Surgical History:   Procedure Laterality Date    COLOSTOMY      CYSTOSCOPY W/ URETERAL STENT PLACEMENT Left     EXPLORATORY LAPAROTOMY      SALPINGECTOMY Bilateral 02/2025     Social History     Tobacco Use    Smoking status: Every Day     Types: Cigarettes    Smokeless tobacco: Current   Vaping Use    Vaping status: Never Used   Substance and Sexual Activity    Alcohol use: Yes     Comment: drinks beer    Drug use: Not Currently     Comment: Denies use    Sexual activity: Not Currently     E-Cigarette/Vaping    E-Cigarette Use  Never User      E-Cigarette/Vaping Substances     Family History   Problem Relation Age of Onset    Heart disease Mother     Stroke Mother     Hypertension Mother     Kidney disease Mother     No Known Problems Father     No Known Problems Sister     No Known Problems Brother      Social History:  Marital Status: /Civil Union   Occupation: unemployed  Patient Pre-hospital Living Situation: Home, With spouse, With other family member: Daughter  Patient Pre-hospital Level of Mobility: walks  Patient Pre-hospital Diet Restrictions: None    Meds/Allergies   I have reviewed home medications with patient personally.  Prior to Admission medications    Medication Sig Start Date End Date Taking? Authorizing Provider   docusate sodium (COLACE) 100 mg capsule Take 1 capsule (100 mg total) by mouth every 12 (twelve) hours 4/2/25   Hao Green, DO   Ostomy Supplies (Premier Colostomy/Ileostomy) KIT by Does not apply route daily 8/14/20   Sheri Stephens, DO     No Known Allergies    Objective :  Temp:  [98.6 °F (37 °C)] 98.6 °F (37 °C)  HR:  [85] 85  BP: (129)/(73) 129/73  Resp:  [18] 18  SpO2:  [94 %] 94 %  O2 Device: None (Room air)    Physical Exam  Vitals and nursing note reviewed.   Constitutional:       General: She is not in acute distress.     Appearance: Normal appearance. She is not ill-appearing.      Comments: Cooperative.  Conversational.  Cachectic.   HENT:      Head: Normocephalic.      Nose: Nose normal.      Mouth/Throat:      Mouth: Mucous membranes are dry.   Eyes:      Extraocular Movements: Extraocular movements intact.      Conjunctiva/sclera: Conjunctivae normal.   Cardiovascular:      Rate and Rhythm: Normal rate and regular rhythm.   Pulmonary:      Effort: Pulmonary effort is normal.      Breath sounds: Normal breath sounds.   Abdominal:      General: Abdomen is flat.      Palpations: Abdomen is soft.      Tenderness: There is no abdominal tenderness.      Comments: Colostomy in place with  brown stool.  No surrounding erythema or drainage.   Musculoskeletal:         General: Normal range of motion.      Cervical back: Normal range of motion.      Right lower leg: No edema.      Left lower leg: No edema.   Skin:     General: Skin is warm and dry.      Capillary Refill: Capillary refill takes less than 2 seconds.      Coloration: Skin is not pale.   Neurological:      Mental Status: She is alert.      Comments: Oriented to self, place, month, and president.  Initially said the year was 2026 but corrected herself to 2025  Motor: 5/5 strength in B/L UE and LE.  No facial droop.  No motor drift B/L  Sensory: Sensation is equal intact in B/L UE and LE.  No aphasia or dysarthria  Has significant confabulation on exam, for example she insists that the number listed on her bedside whiteboard is her daughter's number   Psychiatric:         Mood and Affect: Mood normal.         Thought Content: Thought content normal.          Lines/Drains:    Urinary Catheter:  Goal for removal: N/A- Discharging with Dietrich               Lab Results: I have reviewed the following results:  Results from last 7 days   Lab Units 04/04/25  0145   WBC Thousand/uL 7.80   HEMOGLOBIN g/dL 15.0   HEMATOCRIT % 45.3   PLATELETS Thousands/uL 314   SEGS PCT % 56   LYMPHO PCT % 32   MONO PCT % 6   EOS PCT % 4     Results from last 7 days   Lab Units 04/04/25  0145   SODIUM mmol/L 134*   POTASSIUM mmol/L 4.5   CHLORIDE mmol/L 96   CO2 mmol/L 29   BUN mg/dL 6   CREATININE mg/dL 0.45*   ANION GAP mmol/L 9   CALCIUM mg/dL 9.7   ALBUMIN g/dL 4.3   TOTAL BILIRUBIN mg/dL 0.88   ALK PHOS U/L 79   ALT U/L 12   AST U/L 18   GLUCOSE RANDOM mg/dL 98     Results from last 7 days   Lab Units 04/04/25  0145   INR  0.97         Lab Results   Component Value Date    HGBA1C 5.3 01/22/2015     Results from last 7 days   Lab Units 04/04/25  0145   LACTIC ACID mmol/L 0.6   PROCALCITONIN ng/ml <0.05       Imaging Results Review: I reviewed radiology reports from  this admission including: CT head.  Other Study Results Review: EKG was personally reviewed and my interpretation is: Sinus without acute ischemia.  Normal QTc...    Administrative Statements       ** Please Note: This note has been constructed using a voice recognition system. **

## 2025-04-04 NOTE — ASSESSMENT & PLAN NOTE
- vitamin B1 pending   - continue thiamine supplementation   - encourage continued alcohol cessation   function and prevent re-injury.    Status: [] Progressing: [] Met: [] Not Met: [] Adjusted  Patient will have a decrease in pain to 3/10 to help facilitate improvement in movement, function, and ADLs as indicated by functional deficits.   Status: [] Progressing: [] Met: [] Not Met: [] Adjusted    Long Term Goals: To be achieved in: 3-5 weeks  Disability index score of 25% or less for the Quick DASH to assist with return top prior level of function.   Status: [] Progressing: [] Met: [] Not Met: [] Adjusted  Improve ROM to WNL to allow for proper joint functioning as indicated by patients functional deficits.  Status: [] Progressing: [] Met: [] Not Met: [] Adjusted  Pt to improve strength to 4+/5 or better of wrist extensors and  strength to allow for proper muscle and joint use in functional mobility, ADLs and prior level of function   Status: [] Progressing: [] Met: [] Not Met: [] Adjusted  Patient will return to  Lifting and Tool handling without increased symptoms or restriction to work towards return to prior level of function.        Status: [] Progressing: [] Met: [] Not Met: [] Adjusted  Patient will resume normal work/leisure activities without pain.          Status: [] Progressing: [] Met: [] Not Met: [] Adjusted    TREATMENT PLAN     Plan: Cont POC- Continue emphasis/focus on exercise progression, modulating pain, and increasing ROM. Next visit plan to progress weights, progress reps, and add new exercises     Electronically Signed by Jennifer Guerra, [id number]  Date: 04/09/2024     Note: Portions of this note have been templated and/or copied from initial evaluation, reassessments and prior notes for documentation efficiency.    3 = A little assistance

## 2025-04-04 NOTE — ASSESSMENT & PLAN NOTE
- catheter in place  - management per primary team    Nurse's Notes                                                                                     

 [facility]                                                                 

Name: Musa Aguillon                                                                               

Age: 15 yrs                                                                                       

Sex: Male                                                                                         

: 2005                                                                                   

MRN: [id number]                                                                                   

Arrival Date: 2021                                                                          

Time: 11:51                                                                                       

Account#: [id number]                                                                            

Bed 14                                                                                            

Private MD:                                                                                       

Diagnosis: Generalized abdominal pain                                                             

                                                                                                  

Presentation:                                                                                     

                                                                                             

11:59 Chief complaint: Intermittent upper abdominal pain, worse after eating/drinking, and    hb  

      dizziness x 5 days. Recently seen in ED for same s/s. Coronavirus screen: At this time,     

      the client does not indicate any symptoms associated with coronavirus-19. Ebola Screen:     

      No symptoms or risks identified at this time. Risk Assessment: Do you want to hurt          

      yourself or someone else? Patient reports no desire to harm self or others. Onset of        

      symptoms was 2021.                                                                

11:59 Method Of Arrival: Ambulatory                                                           hb  

11:59 Acuity: JAIME 3                                                                           hb  

                                                                                                  

Historical:                                                                                       

- Allergies:                                                                                      

12:01 PENICILLINS;                                                                            hb  

- Home Meds:                                                                                      

12:01 None [Active];                                                                          hb  

- PMHx:                                                                                           

12:01 None;                                                                                   hb  

- PSHx:                                                                                           

12:01 None;                                                                                   hb  

                                                                                                  

- Immunization history:: Adult Immunizations up to date, Client reports having NOT                

  received the Covid vaccine.                                                                     

- Social history:: Smoking status: Patient denies any tobacco usage or history of.                

                                                                                                  

                                                                                                  

Screenin:39 Abuse screen: Denies threats or abuse. Nutritional screening: No deficits noted.        vg1 

      Tuberculosis screening: No symptoms or risk factors identified.                             

14:39 Pedi Fall Risk Total Score: 0-1 Points : Low Risk for Falls.                            vg1 

                                                                                                  

      Fall Risk Scale Score:                                                                      

14:39 Mobility: Ambulatory with no gait disturbance (0); Mentation: Developmentally           vg1 

      appropriate and alert (0); Elimination: Independent (0); Hx of Falls: No (0); Current       

      Meds: No (0); Total Score: 0                                                                

Assessment:                                                                                       

14:37 General: Appears in no apparent distress. comfortable, Behavior is calm, cooperative.   vg1 

      Pain: Complains of pain in epigastric area and left upper quadrant Pain currently is 3      

      out of 10 on a pain scale. at worst was 10 out of 10 on a pain scale. Aggravated by         

      eating. Neuro: Level of Consciousness is awake, alert, obeys commands, Oriented to          

      person, place, time, situation. Cardiovascular: Patient's skin is warm and dry.             

      Respiratory: Airway is patent Respiratory effort is even, unlabored. GI: Bowel sounds       

      present X 4 quads. Abdomen is tender to palpation in epigastric area and left upper         

      quadrant. : No signs and/or symptoms were reported regarding the genitourinary            

      system. EENT: No signs and/or symptoms were reported regarding the EENT system. Derm:       

      Skin is intact, is healthy with good turgor. Musculoskeletal: Circulation, motion, and      

      sensation intact.                                                                           

15:38 Reassessment: Patient appears in no apparent distress at this time. No changes from     vg1 

      previously documented assessment. Patient and/or family updated on plan of care and         

      expected duration. Pain level reassessed. Patient is alert, oriented x 3, equal             

      unlabored respirations, skin warm/dry/pink.                                                 

                                                                                                  

Vital Signs:                                                                                      

11:59  / 68; Pulse 108; Resp 16; Temp 97.9(O); Pulse Ox 100% on R/A; Pain 0/10;         hb  

14:38  / 78; Pulse 85; Resp 16; Pulse Ox 100% on R/A;                                   vg1 

15:38  / 61; Pulse 74; Resp 18; Pulse Ox 100% on R/A;                                   vg1 

                                                                                                  

ED Course:                                                                                        

11:51 Patient arrived in ED.                                                                  mr  

12:01 Triage completed.                                                                       hb  

12:01 Arm band placed on.                                                                     hb  

14:36 Nu Dobson, RN is Primary Nurse.                                                  vg1 

14:37 Ernesto Naylor PA is PHCP.                                                              [facility] 

14:37 Rachid Major MD is Attending Physician.                                                [facility] 

14:39 Patient has correct armband on for positive identification. Bed in low position. Call   vg1 

      light in reach. Side rails up X 1. Adult w/ patient.                                        

15:29 CT Abd/Pelvis - IV Contrast Only In Process Unspecified.                                EDMS

15:38 Lab(s) recollected, by me, sent to lab.                                                 vg1 

15:38 Inserted saline lock: 22 gauge in left antecubital area, using aseptic technique.       vg1 

      ,using aseptic technique. completed by CT staff.                                            

16:33 Alon Garcia MD is Referral Physician.                                                  [facility] 

17:07 No provider procedures requiring assistance completed. IV discontinued, intact,         vg1 

      bleeding controlled, No redness/swelling at site. Pressure dressing applied.                

                                                                                                  

Administered Medications:                                                                         

16:41 Drug: Zofran (Ondansetron) 4 mg Route: IVP; Site: left antecubital;                     vg1 

17:06 Follow up: Response: Nausea is decreased                                                vg1 

16:42 Drug: Pepcid 20 mg Route: PO;                                                           vg1 

17:06 Follow up: Response: No adverse reaction                                                vg1 

                                                                                                  

                                                                                                  

Outcome:                                                                                          

16:33 Discharge ordered by MD. mondragon 

17:07 Discharged to home ambulatory.                                                          vg1 

17:07 Condition: stable                                                                           

17:07 Discharge instructions given to patient, family, Instructed on discharge instructions,      

      follow up and referral plans. medication usage, Demonstrated understanding of               

      instructions, follow-up care, medications, Prescriptions given X 2.                         

17:07 Patient left the ED.                                                                    vg1 

                                                                                                  

Signatures:                                                                                       

Dispatcher [employer]                           EDMS                                                 

Ernesto Naylor PA PA jmm Rivera, Mary                                 mr                                                   

Peggy Hernandes, CELINA                     RN   Nu Wilson RN                    RN   vg1                                                  

                                                                                                  

Corrections: (The following items were deleted from the chart)                                    

12:02 11:59 Chief complaint: Upper abdominal pain, worse after eating/drinking x 5 days.      hb  

      Recently seen in ED for same s/s. hb                                                        

                                                                                                  

**************************************************************************************************

## 2025-04-04 NOTE — ASSESSMENT & PLAN NOTE
"59 y.o.  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     Workup  - CTH wo contrast 4/4/25:   \"No acute intracranial abnormality.\"     Higher suspicion for psychiatric illness as pt with decline since she stopped drinking alcohol, which may have enhanced her life stressors; however, will do further neurology workup to rule-out neurologic pathology    Plan  - MRI brain wwo contrast ordered  - Labs ordered: anti-microsomal antibody, serum autoimmune encephalopathy profile, vitamin B12, vitamin B1, vitamin B6  - recommend psychiatry evaluation   - Medical management and correction of metabolic and infectious disturbances per primary team   - Avoid CNS altering medications if possible  - Continue supportive care   - Continue to monitor neurologic status. Notify neurology with any changes in exam.     "

## 2025-04-05 ENCOUNTER — APPOINTMENT (INPATIENT)
Dept: MRI IMAGING | Facility: HOSPITAL | Age: 60
DRG: 052 | End: 2025-04-05
Payer: COMMERCIAL

## 2025-04-05 ENCOUNTER — TELEPHONE (OUTPATIENT)
Dept: OTHER | Facility: OTHER | Age: 60
End: 2025-04-05

## 2025-04-05 LAB
ANION GAP SERPL CALCULATED.3IONS-SCNC: 5 MMOL/L (ref 4–13)
BUN SERPL-MCNC: 8 MG/DL (ref 5–25)
CALCIUM SERPL-MCNC: 8.7 MG/DL (ref 8.4–10.2)
CHLORIDE SERPL-SCNC: 100 MMOL/L (ref 96–108)
CO2 SERPL-SCNC: 28 MMOL/L (ref 21–32)
CREAT SERPL-MCNC: 0.43 MG/DL (ref 0.6–1.3)
GFR SERPL CREATININE-BSD FRML MDRD: 110 ML/MIN/1.73SQ M
GLUCOSE SERPL-MCNC: 98 MG/DL (ref 65–140)
MAGNESIUM SERPL-MCNC: 1.8 MG/DL (ref 1.9–2.7)
PHOSPHATE SERPL-MCNC: 3.7 MG/DL (ref 2.3–4.1)
POTASSIUM SERPL-SCNC: 4.5 MMOL/L (ref 3.5–5.3)
SODIUM SERPL-SCNC: 133 MMOL/L (ref 135–147)
THYROPEROXIDASE AB SERPL-ACNC: 377 IU/ML (ref 0–34)

## 2025-04-05 PROCEDURE — A9585 GADOBUTROL INJECTION: HCPCS | Performed by: STUDENT IN AN ORGANIZED HEALTH CARE EDUCATION/TRAINING PROGRAM

## 2025-04-05 PROCEDURE — 83735 ASSAY OF MAGNESIUM: CPT | Performed by: PHYSICIAN ASSISTANT

## 2025-04-05 PROCEDURE — 99233 SBSQ HOSP IP/OBS HIGH 50: CPT | Performed by: STUDENT IN AN ORGANIZED HEALTH CARE EDUCATION/TRAINING PROGRAM

## 2025-04-05 PROCEDURE — 80048 BASIC METABOLIC PNL TOTAL CA: CPT | Performed by: PHYSICIAN ASSISTANT

## 2025-04-05 PROCEDURE — 84100 ASSAY OF PHOSPHORUS: CPT | Performed by: PHYSICIAN ASSISTANT

## 2025-04-05 PROCEDURE — 70553 MRI BRAIN STEM W/O & W/DYE: CPT

## 2025-04-05 RX ORDER — ONDANSETRON 2 MG/ML
4 INJECTION INTRAMUSCULAR; INTRAVENOUS EVERY 4 HOURS PRN
Status: DISCONTINUED | OUTPATIENT
Start: 2025-04-05 | End: 2025-04-08 | Stop reason: HOSPADM

## 2025-04-05 RX ORDER — HYDROXYZINE HYDROCHLORIDE 25 MG/1
25 TABLET, FILM COATED ORAL EVERY 6 HOURS PRN
Status: DISCONTINUED | OUTPATIENT
Start: 2025-04-05 | End: 2025-04-08 | Stop reason: HOSPADM

## 2025-04-05 RX ORDER — MAGNESIUM SULFATE HEPTAHYDRATE 40 MG/ML
2 INJECTION, SOLUTION INTRAVENOUS ONCE
Status: COMPLETED | OUTPATIENT
Start: 2025-04-05 | End: 2025-04-05

## 2025-04-05 RX ORDER — GADOBUTROL 604.72 MG/ML
5 INJECTION INTRAVENOUS
Status: COMPLETED | OUTPATIENT
Start: 2025-04-05 | End: 2025-04-05

## 2025-04-05 RX ADMIN — THIAMINE HYDROCHLORIDE 200 MG: 100 INJECTION, SOLUTION INTRAMUSCULAR; INTRAVENOUS at 13:25

## 2025-04-05 RX ADMIN — MAGNESIUM SULFATE HEPTAHYDRATE 2 G: 40 INJECTION, SOLUTION INTRAVENOUS at 08:48

## 2025-04-05 RX ADMIN — ONDANSETRON 4 MG: 2 INJECTION INTRAMUSCULAR; INTRAVENOUS at 02:19

## 2025-04-05 RX ADMIN — LORAZEPAM 0.5 MG: 2 INJECTION INTRAMUSCULAR; INTRAVENOUS at 11:26

## 2025-04-05 RX ADMIN — HYDROXYZINE HYDROCHLORIDE 25 MG: 25 TABLET, FILM COATED ORAL at 16:35

## 2025-04-05 RX ADMIN — GADOBUTROL 5 ML: 604.72 INJECTION INTRAVENOUS at 12:12

## 2025-04-05 RX ADMIN — THIAMINE HYDROCHLORIDE 200 MG: 100 INJECTION, SOLUTION INTRAMUSCULAR; INTRAVENOUS at 18:13

## 2025-04-05 RX ADMIN — MAGNESIUM SULFATE HEPTAHYDRATE 2 G: 40 INJECTION, SOLUTION INTRAVENOUS at 13:36

## 2025-04-05 RX ADMIN — THIAMINE HYDROCHLORIDE 200 MG: 100 INJECTION, SOLUTION INTRAMUSCULAR; INTRAVENOUS at 22:01

## 2025-04-05 NOTE — PLAN OF CARE
Problem: Potential for Falls  Goal: Patient will remain free of falls  Description: INTERVENTIONS:- Educate patient/family on patient safety including physical limitations- Instruct patient to call for assistance with activity - Consult OT/PT to assist with strengthening/mobility - Keep Call bell within reach- Keep bed low and locked with side rails adjusted as appropriate- Keep care items and personal belongings within reach- Initiate and maintain comfort rounds- Make Fall Risk Sign visible to staff- Offer Toileting every 2 Hours, in advance of need- Initiate/Maintain bed alarm- Obtain necessary fall risk management equipment:- Apply yellow socks and bracelet for high fall risk patients- Consider moving patient to room near nurses station  Outcome: Progressing     Problem: Prexisting or High Potential for Compromised Skin Integrity  Goal: Skin integrity is maintained or improved  Description: INTERVENTIONS:- Identify patients at risk for skin breakdown- Assess and monitor skin integrity- Assess and monitor nutrition and hydration status- Monitor labs - Assess for incontinence - Turn and reposition patient- Assist with mobility/ambulation- Relieve pressure over bony prominences- Avoid friction and shearing- Provide appropriate hygiene as needed including keeping skin clean and dry- Evaluate need for skin moisturizer/barrier cream- Collaborate with interdisciplinary team - Patient/family teaching- Consider wound care consult   Outcome: Progressing     Problem: PAIN - ADULT  Goal: Verbalizes/displays adequate comfort level or baseline comfort level  Description: Interventions:- Encourage patient to monitor pain and request assistance- Assess pain using appropriate pain scale- Administer analgesics based on type and severity of pain and evaluate response- Implement non-pharmacological measures as appropriate and evaluate response- Consider cultural and social influences on pain and pain management- Notify  physician/advanced practitioner if interventions unsuccessful or patient reports new pain  Outcome: Progressing     Problem: DISCHARGE PLANNING  Goal: Discharge to home or other facility with appropriate resources  Description: INTERVENTIONS:- Identify barriers to discharge w/patient and caregiver- Arrange for needed discharge resources and transportation as appropriate- Identify discharge learning needs (meds, wound care, etc.)- Arrange for interpretive services to assist at discharge as needed- Refer to Case Management Department for coordinating discharge planning if the patient needs post-hospital services based on physician/advanced practitioner order or complex needs related to functional status, cognitive ability, or social support system  Outcome: Progressing     Problem: Knowledge Deficit  Goal: Patient/family/caregiver demonstrates understanding of disease process, treatment plan, medications, and discharge instructions  Description: Complete learning assessment and assess knowledge base.Interventions:- Provide teaching at level of understanding- Provide teaching via preferred learning methods  Outcome: Progressing     Problem: NEUROSENSORY - ADULT  Goal: Achieves maximal functionality and self care  Description: INTERVENTIONS- Monitor swallowing and airway patency with patient fatigue and changes in neurological status- Encourage and assist patient to increase activity and self care. - Encourage visually impaired, hearing impaired and aphasic patients to use assistive/communication devices  Outcome: Progressing

## 2025-04-05 NOTE — PLAN OF CARE
Problem: Potential for Falls  Goal: Patient will remain free of falls  Description: INTERVENTIONS:- Educate patient/family on patient safety including physical limitations- Instruct patient to call for assistance with activity - Consult OT/PT to assist with strengthening/mobility - Keep Call bell within reach- Keep bed low and locked with side rails adjusted as appropriate- Keep care items and personal belongings within reach- Initiate and maintain comfort rounds- Make Fall Risk Sign visible to staff- Offer Toileting every  Hours, in advance of need- Initiate/Maintain alarm- Obtain necessary fall risk management equipment: Apply yellow socks and bracelet for high fall risk patients- Consider moving patient to room near nurses station  Outcome: Progressing     Problem: Prexisting or High Potential for Compromised Skin Integrity  Goal: Skin integrity is maintained or improved  Description: INTERVENTIONS:- Identify patients at risk for skin breakdown- Assess and monitor skin integrity- Assess and monitor nutrition and hydration status- Monitor labs - Assess for incontinence - Turn and reposition patient- Assist with mobility/ambulation- Relieve pressure over bony prominences- Avoid friction and shearing- Provide appropriate hygiene as needed including keeping skin clean and dry- Evaluate need for skin moisturizer/barrier cream- Collaborate with interdisciplinary team - Patient/family teaching- Consider wound care consult   Outcome: Progressing     Problem: PAIN - ADULT  Goal: Verbalizes/displays adequate comfort level or baseline comfort level  Description: Interventions:- Encourage patient to monitor pain and request assistance- Assess pain using appropriate pain scale- Administer analgesics based on type and severity of pain and evaluate response- Implement non-pharmacological measures as appropriate and evaluate response- Consider cultural and social influences on pain and pain management- Notify  physician/advanced practitioner if interventions unsuccessful or patient reports new pain  Outcome: Progressing     Problem: DISCHARGE PLANNING  Goal: Discharge to home or other facility with appropriate resources  Description: INTERVENTIONS:- Identify barriers to discharge w/patient and caregiver- Arrange for needed discharge resources and transportation as appropriate- Identify discharge learning needs (meds, wound care, etc.)- Arrange for interpretive services to assist at discharge as needed- Refer to Case Management Department for coordinating discharge planning if the patient needs post-hospital services based on physician/advanced practitioner order or complex needs related to functional status, cognitive ability, or social support system  Outcome: Progressing     Problem: Knowledge Deficit  Goal: Patient/family/caregiver demonstrates understanding of disease process, treatment plan, medications, and discharge instructions  Description: Complete learning assessment and assess knowledge base.Interventions:- Provide teaching at level of understanding- Provide teaching via preferred learning methods  Outcome: Progressing     Problem: NEUROSENSORY - ADULT  Goal: Achieves maximal functionality and self care  Description: INTERVENTIONS- Monitor swallowing and airway patency with patient fatigue and changes in neurological status- Encourage and assist patient to increase activity and self care. - Encourage visually impaired, hearing impaired and aphasic patients to use assistive/communication devices  Outcome: Progressing

## 2025-04-05 NOTE — PROGRESS NOTES
Progress Note - Hospitalist   Name: Berta Smart 59 y.o. female I MRN: [id number]  Unit/Bed#: -01 I Date of Admission: 4/4/2025   Date of Service: 4/5/2025 I Hospital Day: 1    Assessment & Plan  Encephalopathy  Presenting to the ED with increasing confusion and erratic behavior per family  On review of ED documentation from 4/2 appeared to have intermittent confusion and abnormal responses then as well  History of alcoholism in the past  Patient does have significant confabulations at time of admission, otherwise is oriented to self, place, month, and corrected herself on the year  Workup relatively unrevealing in the ED including labs and CTh  Concern for Wernicke encephalopathy-continue high-dose thiamine  Daughter had expressed concern about ability for the patient to stay at home-CM consulted for possible placement  anti-microsomal antibody,   serum autoimmune encephalopathy profile,   vitamin B12 WNL  vitamin B1-> will skewed d/t pt receiving high dose thiamine   vitamin B6   MRI showing no acute pathology  Discussed with neuro unlikely to be infectious in nature also seems that the pt was slef medicating with alcohol, seems like it may be more behavioral  Consult pysch  Urinary retention  Found to have urinary retention during recent ED visit on 4/2  Continue Dietrich  Alcoholism ([facility])  History of alcoholism in the past - approximately 7 beers daily   Reports that she last drink alcohol a few months ago - daughter also reports last drink was 2 months ago   No signs of alcohol withdrawal on admission  Concern for Warnicke encephalopathy as above  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    Mobility:   Basic Mobility Inpatient Raw Score: 16  JH-HLM Goal: 5: Stand one or more mins  JH-HLM Achieved: 5: Stand (1 or more minutes)  JH-HLM Goal  achieved. Continue to encourage appropriate mobility.    Patient Centered Rounds: I performed bedside rounds with nursing staff today.   Discussions with Specialists or Other Care Team Provider: neuro, CM    Education and Discussions with Family / Patient: Updated  (daughter) via phone.    Current Length of Stay: 1 day(s)  Current Patient Status: Inpatient   Certification Statement: The patient will continue to require additional inpatient hospital stay due to ams  Discharge Plan: Anticipate discharge in 24-48 hrs to tbd    Code Status: Level 1 - Full Code    Gilda Hampton was seen and examined at bedside.  No acute events overnight.  Discussed plan of care.  All questions and concerns were answered and addressed.  Has no acute complaints at this time seems to be withdrawn.  Did not seem aggressive at this time however throughout the day patient became agitated. Discussed with neuro unlikely to infectious or anything acute in nature. Most likely psych related    Objective :  Temp:  [97.7 °F (36.5 °C)-98.6 °F (37 °C)] 97.7 °F (36.5 °C)  HR:  [68-93] 68  BP: (102-124)/(62-79) 102/62  Resp:  [18-22] 18  SpO2:  [91 %-96 %] 95 %  O2 Device: None (Room air)    Body mass index is 16.3 kg/m².     Input and Output Summary (last 24 hours):     Intake/Output Summary (Last 24 hours) at 4/5/2025 0730  Last data filed at 4/4/2025 2120  Gross per 24 hour   Intake 765 ml   Output 825 ml   Net -60 ml       Physical Exam  Vitals and nursing note reviewed.   Constitutional:       General: She is not in acute distress.     Appearance: She is not ill-appearing.   HENT:      Head: Normocephalic and atraumatic.   Cardiovascular:      Rate and Rhythm: Normal rate and regular rhythm.      Pulses: Normal pulses.      Heart sounds: Normal heart sounds.   Pulmonary:      Effort: Pulmonary effort is normal.      Breath sounds: Normal breath sounds.   Abdominal:      General: Abdomen is flat. Bowel sounds are normal.       Palpations: Abdomen is soft.   Musculoskeletal:      Right lower leg: No edema.      Left lower leg: No edema.   Skin:     General: Skin is warm.   Neurological:      General: No focal deficit present.      Mental Status: She is alert and oriented to person, place, and time.      Comments: Rude, gets off topic and says random things unrelated to the conversation that was being held           Lines/Drains:  Lines/Drains/Airways       Active Status       Name Placement date Placement time Site Days    Urethral Catheter Straight-tip 16 Fr. 04/02/25  1700  Straight-tip  2                  Urinary Catheter:  Goal for removal: N/A- Discharging with Dietrich                 Lab Results: I have reviewed the following results:   Results from last 7 days   Lab Units 04/04/25  0145   WBC Thousand/uL 7.80   HEMOGLOBIN g/dL 15.0   HEMATOCRIT % 45.3   PLATELETS Thousands/uL 314   SEGS PCT % 56   LYMPHO PCT % 32   MONO PCT % 6   EOS PCT % 4     Results from last 7 days   Lab Units 04/05/25  0604 04/04/25  0145   SODIUM mmol/L 133* 134*   POTASSIUM mmol/L 4.5 4.5   CHLORIDE mmol/L 100 96   CO2 mmol/L 28 29   BUN mg/dL 8 6   CREATININE mg/dL 0.43* 0.45*   ANION GAP mmol/L 5 9   CALCIUM mg/dL 8.7 9.7   ALBUMIN g/dL  --  4.3   TOTAL BILIRUBIN mg/dL  --  0.88   ALK PHOS U/L  --  79   ALT U/L  --  12   AST U/L  --  18   GLUCOSE RANDOM mg/dL 98 98     Results from last 7 days   Lab Units 04/04/25  0145   INR  0.97             Results from last 7 days   Lab Units 04/04/25  0145   LACTIC ACID mmol/L 0.6   PROCALCITONIN ng/ml <0.05       Recent Cultures (last 7 days):   Results from last 7 days   Lab Units 04/04/25  0145   BLOOD CULTURE  Received in Microbiology Lab. Culture in Progress.  Received in Microbiology Lab. Culture in Progress.       Imaging Results Review: No pertinent imaging studies reviewed.  Other Study Results Review: No additional pertinent studies reviewed.    Last 24 Hours Medication List:     Current Facility-Administered  Medications:     acetaminophen (TYLENOL) tablet 650 mg, Q6H PRN    aluminum-magnesium hydroxide-simethicone (MAALOX) oral suspension 30 mL, Q6H PRN    LORazepam (ATIVAN) injection 0.5 mg, Once PRN **OR** LORazepam (ATIVAN) injection 0.5 mg, Once PRN    magnesium sulfate 2 g/50 mL IVPB (premix) 2 g, Once **FOLLOWED BY** magnesium sulfate 2 g/50 mL IVPB (premix) 2 g, Once    ondansetron (ZOFRAN) injection 4 mg, Q4H PRN    senna (SENOKOT) tablet 8.6 mg, HS PRN    thiamine (VITAMIN B1) 200 mg in sodium chloride 0.9 % 50 mL IVPB, TID, Last Rate: 200 mg (04/04/25 2235) **FOLLOWED BY** [START ON 4/12/2025] thiamine (VITAMIN B1) 250 mg in sodium chloride 0.9 % 50 mL IVPB, Daily    Administrative Statements   Today, Patient Was Seen By: Swetha Baker MD  I have spent a total time of 56 minutes in caring for this patient on the day of the visit/encounter including Diagnostic results, Prognosis, Risks and benefits of tx options, Instructions for management, Patient and family education, Importance of tx compliance, Risk factor reductions, Impressions, Counseling / Coordination of care, Documenting in the medical record, Reviewing/placing orders in the medical record (including tests, medications, and/or procedures), Obtaining or reviewing history  , and Communicating with other healthcare professionals .    **Please Note: This note may have been constructed using a voice recognition system.**

## 2025-04-05 NOTE — TELEPHONE ENCOUNTER
"Pt's daughter stated, \" I need to speak with a  about my mother,  in regards to what's going on at home and about her health insurance.\"      Please follow up, thank you   "

## 2025-04-05 NOTE — ASSESSMENT & PLAN NOTE
Presenting to the ED with increasing confusion and erratic behavior per family  On review of ED documentation from 4/2 appeared to have intermittent confusion and abnormal responses then as well  History of alcoholism in the past  Patient does have significant confabulations at time of admission, otherwise is oriented to self, place, month, and corrected herself on the year  Workup relatively unrevealing in the ED including labs and CTh  Concern for Wernicke encephalopathy-continue high-dose thiamine  Daughter had expressed concern about ability for the patient to stay at home-CM consulted for possible placement  anti-microsomal antibody,   serum autoimmune encephalopathy profile,   vitamin B12 WNL  vitamin B1-> will skewed d/t pt receiving high dose thiamine   vitamin B6   MRI showing no acute pathology  Discussed with neuro unlikely to be infectious in nature also seems that the pt was slef medicating with alcohol, seems like it may be more behavioral  Consult elliot

## 2025-04-05 NOTE — PROGRESS NOTES
"Pt states she lives in a 4 story home where she resided with her  and daughter. She spends all her time in the living room-kitchen area and \"my  built a toilet in the closet that he can flush because the buckets were getting to heavy to carry to the bathroom upstairs\".   "

## 2025-04-06 PROBLEM — E43 SEVERE PROTEIN-CALORIE MALNUTRITION (HCC): Status: ACTIVE | Noted: 2025-04-06

## 2025-04-06 LAB
ANION GAP SERPL CALCULATED.3IONS-SCNC: 5 MMOL/L (ref 4–13)
BUN SERPL-MCNC: 14 MG/DL (ref 5–25)
CALCIUM SERPL-MCNC: 8.9 MG/DL (ref 8.4–10.2)
CHLORIDE SERPL-SCNC: 100 MMOL/L (ref 96–108)
CO2 SERPL-SCNC: 28 MMOL/L (ref 21–32)
CREAT SERPL-MCNC: 0.41 MG/DL (ref 0.6–1.3)
GFR SERPL CREATININE-BSD FRML MDRD: 112 ML/MIN/1.73SQ M
GLUCOSE SERPL-MCNC: 87 MG/DL (ref 65–140)
MAGNESIUM SERPL-MCNC: 2 MG/DL (ref 1.9–2.7)
POTASSIUM SERPL-SCNC: 4.4 MMOL/L (ref 3.5–5.3)
SODIUM SERPL-SCNC: 133 MMOL/L (ref 135–147)
T3 SERPL-MCNC: 1.2 NG/ML (ref 0.9–1.8)
T4 SERPL-MCNC: 7.54 UG/DL (ref 6.09–12.23)

## 2025-04-06 PROCEDURE — 84445 ASSAY OF TSI GLOBULIN: CPT | Performed by: PSYCHIATRY & NEUROLOGY

## 2025-04-06 PROCEDURE — 86800 THYROGLOBULIN ANTIBODY: CPT | Performed by: PSYCHIATRY & NEUROLOGY

## 2025-04-06 PROCEDURE — 86376 MICROSOMAL ANTIBODY EACH: CPT | Performed by: PSYCHIATRY & NEUROLOGY

## 2025-04-06 PROCEDURE — 80048 BASIC METABOLIC PNL TOTAL CA: CPT | Performed by: STUDENT IN AN ORGANIZED HEALTH CARE EDUCATION/TRAINING PROGRAM

## 2025-04-06 PROCEDURE — 99233 SBSQ HOSP IP/OBS HIGH 50: CPT | Performed by: STUDENT IN AN ORGANIZED HEALTH CARE EDUCATION/TRAINING PROGRAM

## 2025-04-06 PROCEDURE — 84436 ASSAY OF TOTAL THYROXINE: CPT | Performed by: PSYCHIATRY & NEUROLOGY

## 2025-04-06 PROCEDURE — 83735 ASSAY OF MAGNESIUM: CPT | Performed by: STUDENT IN AN ORGANIZED HEALTH CARE EDUCATION/TRAINING PROGRAM

## 2025-04-06 PROCEDURE — 84480 ASSAY TRIIODOTHYRONINE (T3): CPT | Performed by: PSYCHIATRY & NEUROLOGY

## 2025-04-06 PROCEDURE — 99232 SBSQ HOSP IP/OBS MODERATE 35: CPT | Performed by: PSYCHIATRY & NEUROLOGY

## 2025-04-06 RX ADMIN — THIAMINE HYDROCHLORIDE 200 MG: 100 INJECTION, SOLUTION INTRAMUSCULAR; INTRAVENOUS at 17:41

## 2025-04-06 RX ADMIN — SENNOSIDES 8.6 MG: 8.6 TABLET, FILM COATED ORAL at 04:48

## 2025-04-06 RX ADMIN — THIAMINE HYDROCHLORIDE 200 MG: 100 INJECTION, SOLUTION INTRAMUSCULAR; INTRAVENOUS at 09:17

## 2025-04-06 RX ADMIN — ACETAMINOPHEN 650 MG: 325 TABLET, FILM COATED ORAL at 14:12

## 2025-04-06 RX ADMIN — HYDROXYZINE HYDROCHLORIDE 25 MG: 25 TABLET, FILM COATED ORAL at 04:48

## 2025-04-06 RX ADMIN — HYDROXYZINE HYDROCHLORIDE 25 MG: 25 TABLET, FILM COATED ORAL at 14:12

## 2025-04-06 RX ADMIN — THIAMINE HYDROCHLORIDE 200 MG: 100 INJECTION, SOLUTION INTRAMUSCULAR; INTRAVENOUS at 21:05

## 2025-04-06 NOTE — ASSESSMENT & PLAN NOTE
Presenting to the ED with increasing confusion and erratic behavior per family  On review of ED documentation from 4/2 appeared to have intermittent confusion and abnormal responses then as well  History of alcoholism in the past  Patient does have significant confabulations at time of admission, otherwise is oriented to self, place, month, and corrected herself on the year  Workup relatively unrevealing in the ED including labs and CTh  Concern for Wernicke encephalopathy-continue high-dose thiamine  Daughter had expressed concern about ability for the patient to stay at home-CM consulted for possible placement  anti-microsomal antibody, 377-> thyroid Ab panel anti thyroglobulin ab t3 and t4 pending  serum autoimmune encephalopathy profile,   vitamin B12 WNL  vitamin B1-> will skewed d/t pt receiving high dose thiamine   vitamin B6   MRI showing no acute pathology  Discussed with neuro unlikely to be infectious in nature also seems that the pt was self medicating with alcohol, seems like it may be more behavioral  Consult elliot

## 2025-04-06 NOTE — ASSESSMENT & PLAN NOTE
Malnutrition Findings:   Adult Malnutrition type: Chronic illness  Adult Degree of Malnutrition: Other severe protein calorie malnutrition  Malnutrition Characteristics: Muscle loss, Fat loss                  360 Statement: Chronic/severe malnutrition r/t inadequate intake, as evidenced by severe fat loss (orbitals) and muscle mass depletion (temporal, clavicle, shoulder). Treated with liberal PO diet + oral nutrition supplements    BMI Findings:  Adult BMI Classifications: Underweight < 18.5        Body mass index is 16.3 kg/m².

## 2025-04-06 NOTE — UTILIZATION REVIEW
Initial Clinical Review    Admission: Date/Time/Statement:   Admission Orders (From admission, onward)       Ordered        04/04/25 0414  INPATIENT ADMISSION  Once                          Orders Placed This Encounter   Procedures    INPATIENT ADMISSION     Standing Status:   Standing     Number of Occurrences:   1     Level of Care:   Med Surg [16]     Estimated length of stay:   More than 2 Midnights     Certification:   I certify that inpatient services are medically necessary for this patient for a duration of greater than two midnights. See H&P and MD Progress Notes for additional information about the patient's course of treatment.     ED Arrival Information       Expected   -    Arrival   4/4/2025 01:18    Acuity   Urgent              Means of arrival   Ambulance    Escorted by   [facility]    Service   Hospitalist    Admission type   Emergency              Arrival complaint   ams             Chief Complaint   Patient presents with    Altered Mental Status     Pt coming in via EMS for AMS, Family told Ems that the pt has been acting more confused erratic for the past 2 days. Pt uncooperative, cursing at staff during triage       Initial Presentation: 59 y.o. female to ED presents for confusion and erratic behavior that onset 2 days per daughter. She reports that her mother started acting very childish and became upset about very simple things. Approx 3 to 4 wks ago she noticed abnormal behavior in her mother. describes this as lifting her legs up and bicycling in the air and grabbing on to a door saying she was working out.  Patient's sister also noted confusion and abnormal things. Pt stopped drinking 2 months ago. She was a heavy alcoholic with at least 7 beers daily for several years. Seen in ED on 4/2 for constipation. Found with Urinary retention  PMH for colon resection with colostomy for 15 yrsand alcohol abuse.   Admit to Inpatient Dx; Encephalopathy, Urinary retention  Concern for Wernicke  encephalopathy-high-dose thiamine initiated   Plan; Neurology consult. MRI Brain. Iv High dose thiamine. IVFs. Dietrich cath. Daily colostomy care    4/4  Neurology cons; Per discussion with daughter present and , patient has life stressors, anxiety, cannot exclude other psychiatric conditions as she does not regularly see visions but there is extensive family history of psychiatric illness. Patient stopped drinking alcohol 2 months ago per daughter, according to patient she stopped 10 to 15 years ago with occasional drinks.   She actually was drinking regularly up until 2 months ago that cold turkey stopped. Since stopping the alcohol she has been more irritable, more withdrawn, more frustrated, and has stated seizure disorder but no recent seizures. It appears that the alcohol may have been masking some of her symptoms. She has decreased bulk throughout, weight loss, does not have any upper motor neuron findings on exam. Given that she has longstanding symptoms that have this suddenly worsened over the past couple months lower suspicion for autoimmune encephalitis therefore we will hold off on the lumbar puncture does get a brain MRI along with checking certain vitamins we will still send out a serum autoimmune encephalopathy profile. Continue evaluating for toxic metabolic derangements. She can follow simple commands, flat affect for eye contact, no focal neurologic symptoms.     Anticipated Length of Stay/Certification Statement: Patient will be admitted on an inpatient basis with an anticipated length of stay of greater than 2 midnights secondary to encephalopathy     Date: 4/5   Day 2:   Progress notes; anti-microsomal antibody, serum autoimmune encephalopathy profile. Vit B6. Vit B1-> will skewed d/t pt receiving high dose thiamine.  Continue high-dose thiamine.  Certification Statement: The patient will continue to require additional inpatient hospital stay due to ams   Pt did not seem aggressive at this  time however throughout the day patient became agitated   On exam;  Gets off topic and says random things unrelated to the conversation that was being held          ED Treatment-Medication Administration from 04/04/2025 0118 to 04/04/2025 1659         Date/Time Order Dose Route Action     04/04/2025 0208 folic acid 1 mg in sodium chloride 0.9 % 50 mL IVPB 1 mg Intravenous New Bag     04/04/2025 0211 thiamine (VITAMIN B1) 100 mg in sodium chloride 0.9 % 50 mL IVPB 100 mg Intravenous New Bag     04/04/2025 0338 magnesium sulfate 2 g/50 mL IVPB (premix) 2 g 2 g Intravenous New Bag     04/04/2025 0338 ondansetron (ZOFRAN) injection 4 mg 4 mg Intravenous Given     04/04/2025 0451 thiamine (VITAMIN B1) 100 mg in sodium chloride 0.9 % 50 mL IVPB 100 mg Intravenous New Bag     04/04/2025 1016 multi-electrolyte (Plasmalyte-A/Isolyte-S PH 7.4/Normosol-R) IV solution 75 mL/hr Intravenous New Bag            Scheduled Medications:  thiamine, 200 mg, Intravenous, TID   Followed by  [START ON 4/12/2025] thiamine, 250 mg, Intravenous, Daily      Continuous IV Infusions:   multi-electrolyte (Plasmalyte-A/Isolyte-S PH 7.4/Normosol-R) IV solution  Rate: 75 mL/hr Dose: 75 mL/hr  Freq: Continuous Route: IV  Last Dose: Stopped (04/04/25 2028)  Start: 04/04/25 0815 End: 04/04/25 2015    PRN Meds:  acetaminophen, 650 mg, Oral, Q6H PRN  aluminum-magnesium hydroxide-simethicone, 30 mL, Oral, Q6H PRN  hydrOXYzine HCL, 25 mg, Oral, Q6H PRN 4/5 x1  ondansetron, 4 mg, Intravenous, Q4H PRN 4/4 x1, 4/5 x1  senna, 1 tablet, Oral, HS PRN      ED Triage Vitals   Temperature Pulse Respirations Blood Pressure SpO2 Pain Score   04/04/25 0140 04/04/25 0140 04/04/25 0141 04/04/25 0142 04/04/25 0140 04/04/25 0503   98.6 °F (37 °C) 85 18 129/73 94 % No Pain     Weight (last 2 days)       Date/Time Weight    04/04/25 0504 45.8 (100.97)            Vital Signs (last 3 days)       Date/Time Temp Pulse Resp BP MAP (mmHg) SpO2 O2 Device Patient Position -  Orthostatic VS [place] Coma Scale Score CIWA-Ar Total Pain    04/06/25 07:03:37 -- 74 -- 122/79 93 97 % -- -- -- -- --    04/05/25 2100 98.2 °F (36.8 °C) 88 18 119/77 -- -- -- Lying -- -- --    04/05/25 2000 -- -- -- -- -- -- -- -- 15 -- No Pain    04/05/25 1812 97.6 °F (36.4 °C) -- -- -- -- -- -- -- -- -- --    04/05/25 17:52:59 -- 76 18 101/65 77 95 % -- Sitting -- 3 --    04/05/25 1626 -- -- -- -- -- 94 % None (Room air) -- -- -- --    04/05/25 15:25:13 97.6 °F (36.4 °C) 97 -- 119/74 89 94 % None (Room air) Lying -- 5 --    04/05/25 0803 -- -- 20 -- -- -- -- Lying -- -- --    04/05/25 07:55:56 -- -- -- 140/90 107 -- -- -- -- -- --    04/05/25 0745 -- -- -- -- -- 94 % None (Room air) -- 15 -- No Pain    04/05/25 01:58:09 -- 68 -- 102/62 75 95 % -- -- -- -- --    04/04/25 2131 -- -- -- -- -- -- -- -- -- -- 10 - Worst Possible Pain    04/04/25 2130 -- -- -- -- -- -- -- -- 15 -- --    04/04/25 2120 97.7 °F (36.5 °C) 93 -- 119/79 92 95 % -- -- -- -- --    04/04/25 1751 -- -- -- -- -- -- None (Room air) -- 15 -- No Pain    04/04/25 1738 -- -- -- -- -- -- -- -- -- -- No Pain    04/04/25 17:04:46 98.6 °F (37 °C) 79 18 103/66 78 91 % -- -- -- -- --    04/04/25 1500 -- 81 18 117/69 88 96 % -- -- -- -- --    04/04/25 1400 -- 76 22 124/66 88 95 % -- -- -- -- --    04/04/25 1200 -- 75 21 117/73 88 94 % -- -- -- -- --    04/04/25 1100 -- 75 22 121/79 -- 94 % -- -- -- -- --    04/04/25 0645 -- -- -- -- -- -- -- -- 15 -- No Pain    04/04/25 0503 -- -- -- -- -- -- -- -- -- -- No Pain    04/04/25 0223 -- -- -- -- -- -- None (Room air) -- -- -- --    04/04/25 0142 -- -- -- 129/73 -- -- -- -- -- -- --    04/04/25 0141 -- -- 18 -- -- -- -- -- -- -- --    04/04/25 0140 98.6 °F (37 °C) 85 -- -- -- 94 % None (Room air) -- -- -- --            Pertinent Labs/Diagnostic Test Results:   Radiology:  MRI brain w wo contrast   Final Interpretation by Rosendo Martínez MD (04/05 2412)      1. No acute infarction, intracranial hemorrhage or  mass.      2. Small left mastoid effusion.      Resident: ANA NAIR I, the attending radiologist, have reviewed the images and agree with the final report above.      Workstation performed: [id number]         CT head without contrast   Final Interpretation by Nadege Gamez MD (04/04 0255)      No acute intracranial abnormality.                  Workstation performed: [id number]         XR chest portable   Final Interpretation by Valeri Rock MD (04/04 0939)      No acute cardiopulmonary disease.            Workstation performed: [id number]           Cardiology:  ECG 12 lead   Final Result by Leda Bowman MD (04/04 1116)   Normal sinus rhythm   Right superior axis deviation   Pulmonary disease pattern   Abnormal ECG   When compared with ECG of 17-Jan-2015 20:29,   T wave inversion no longer evident in Anterior leads   Confirmed by Leda Bowman ([id number]) on 4/4/2025 11:16:19 AM        GI:  No orders to display           Results from last 7 days   Lab Units 04/04/25  0145 04/02/25  1134   WBC Thousand/uL 7.80 8.29   HEMOGLOBIN g/dL 15.0 15.4   HEMATOCRIT % 45.3 45.7   PLATELETS Thousands/uL 314 333   TOTAL NEUT ABS Thousands/µL 4.37 5.40         Results from last 7 days   Lab Units 04/05/25  0604 04/04/25 0145 04/02/25  1134   SODIUM mmol/L 133* 134* 130*   POTASSIUM mmol/L 4.5 4.5 3.9   CHLORIDE mmol/L 100 96 93*   CO2 mmol/L 28 29 30   ANION GAP mmol/L 5 9 7   BUN mg/dL 8 6 5   CREATININE mg/dL 0.43* 0.45* 0.40*   EGFR ml/min/1.73sq m 110 109 113   CALCIUM mg/dL 8.7 9.7 9.7   MAGNESIUM mg/dL 1.8* 1.8* 1.7*   PHOSPHORUS mg/dL 3.7  --   --      Results from last 7 days   Lab Units 04/04/25  0145 04/02/25  1134   AST U/L 18 15   ALT U/L 12 12   ALK PHOS U/L 79 83   TOTAL PROTEIN g/dL 7.2 7.6   ALBUMIN g/dL 4.3 4.4   TOTAL BILIRUBIN mg/dL 0.88 0.63   AMMONIA umol/L 38  --          Results from last 7 days   Lab Units 04/05/25  0604 04/04/25  0145 04/02/25  1134   GLUCOSE RANDOM mg/dL 98 98  113     Results from last 7 days   Lab Units 04/04/25  0145   HS TNI 0HR ng/L <2         Results from last 7 days   Lab Units 04/04/25  0145   PROTIME seconds 13.4   INR  0.97   PTT seconds 34     Results from last 7 days   Lab Units 04/02/25  1134   TSH 3RD GENERATON uIU/mL 4.039     Results from last 7 days   Lab Units 04/04/25  0145   PROCALCITONIN ng/ml <0.05     Results from last 7 days   Lab Units 04/04/25  0145   LACTIC ACID mmol/L 0.6       Results from last 7 days   Lab Units 04/02/25  1134   LIPASE u/L 19                 Results from last 7 days   Lab Units 04/04/25  0154 04/02/25  1420   CLARITY UA  Clear Clear   COLOR UA  Yellow Light Yellow   SPEC GRAV UA  1.020 <1.005*   PH UA  6.5 7.0   GLUCOSE UA mg/dl Negative Negative   KETONES UA mg/dl 10 (1+)* 10 (1+)*   BLOOD UA  Trace* Negative   PROTEIN UA mg/dl Trace* Negative   NITRITE UA  Negative Negative   BILIRUBIN UA  Negative Negative   UROBILINOGEN UA (BE) mg/dl <2.0 <2.0   LEUKOCYTES UA  Moderate* Negative   WBC UA /hpf 4-10*  --    RBC UA /hpf 1-2  --    BACTERIA UA /hpf Occasional  --    EPITHELIAL CELLS WET PREP /hpf Occasional  --              Results from last 7 days   Lab Units 04/02/25  1420   AMPH/METH  Negative   BARBITURATE UR  Negative   BENZODIAZEPINE UR  Negative   COCAINE UR  Negative   METHADONE URINE  Negative   OPIATE UR  Negative   PCP UR  Negative   THC UR  Negative     Results from last 7 days   Lab Units 04/04/25  0145   ETHANOL LVL mg/dL <10   ACETAMINOPHEN LVL ug/mL <2*   SALICYLATE LVL mg/dL <5                 Results from last 7 days   Lab Units 04/04/25  0145   BLOOD CULTURE  No Growth at 48 hrs.  No Growth at 48 hrs.                   Past Medical History:   Diagnosis Date    Alcoholism (HCC)     Depression     Hypothyroidism     PTSD (post-traumatic stress disorder)      Present on Admission:   Alcoholism (HCC)      Admitting Diagnosis: Altered mental status [R41.82]  Low magnesium level [R79.0]  Encephalopathy,  unspecified type [G93.40]  Age/Sex: 59 y.o. female    Network Utilization Review Department  ATTENTION: Please call with any questions or concerns to [phone] and carefully listen to the prompts so that you are directed to the right person. All voicemails are confidential.   For Discharge needs, contact Care Management DC Support Team at [phone] opt. 2  Send all requests for admission clinical reviews, approved or denied determinations and any other requests to dedicated fax number below belonging to the [facility] where the patient is receiving treatment. List of dedicated fax numbers for the Facilities:  FACILITY NAME UR FAX NUMBER   ADMISSION DENIALS (Administrative/Medical Necessity) [phone]   DISCHARGE SUPPORT TEAM (NETWORK) [phone]   PARENT CHILD HEALTH (Maternity/NICU/Pediatrics) [phone]   [facility] [phone]   [facility] [phone]   [facility] [phone]   [facility] [phone]   [facility] [phone]   [facility] [phone]   [facility] [phone]   [facility] [phone]   [facility] [phone]   [facility] [phone]   [facility] [phone]   [facility] [phone]

## 2025-04-06 NOTE — PROGRESS NOTES
Progress Note - Hospitalist   Name: Berta Smart 59 y.o. female I MRN: [id number]  Unit/Bed#: -01 I Date of Admission: 4/4/2025   Date of Service: 4/6/2025 I Hospital Day: 2    Assessment & Plan  Encephalopathy  Presenting to the ED with increasing confusion and erratic behavior per family  On review of ED documentation from 4/2 appeared to have intermittent confusion and abnormal responses then as well  History of alcoholism in the past  Patient does have significant confabulations at time of admission, otherwise is oriented to self, place, month, and corrected herself on the year  Workup relatively unrevealing in the ED including labs and CTh  Concern for Wernicke encephalopathy-continue high-dose thiamine  Daughter had expressed concern about ability for the patient to stay at home-CM consulted for possible placement  anti-microsomal antibody, 377-> thyroid Ab panel anti thyroglobulin ab t3 and t4 pending  serum autoimmune encephalopathy profile,   vitamin B12 WNL  vitamin B1-> will skewed d/t pt receiving high dose thiamine   vitamin B6   MRI showing no acute pathology  Discussed with neuro unlikely to be infectious in nature also seems that the pt was self medicating with alcohol, seems like it may be more behavioral  Consult pysch  Urinary retention  Found to have urinary retention during recent ED visit on 4/2  Continue Dietrich  Alcoholism (HCC)  History of alcoholism in the past - approximately 7 beers daily   Reports that she last drink alcohol a few months ago - daughter also reports last drink was 2 months ago   No signs of alcohol withdrawal on admission  Concern for Warnicke encephalopathy as above  Colostomy status (HCC)  S/p 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 Treated with liberal PO diet + oral nutrition supplements    BMI Findings:  Adult BMI Classifications: Underweight < 18.5        Body mass index is 16.3 kg/m².       VTE Pharmacologic Prophylaxis: VTE Score: 1 Low Risk (Score 0-2) - Encourage Ambulation.    Mobility:   Basic Mobility Inpatient Raw Score: 16  JH-HLM Goal: 5: Stand one or more mins  JH-HLM Achieved: 5: Stand (1 or more minutes)  JH-HLM Goal achieved. Continue to encourage appropriate mobility.    Patient Centered Rounds: I performed bedside rounds with nursing staff today.   Discussions with Specialists or Other Care Team Provider: neuro, CM    Education and Discussions with Family / Patient: Updated  (daughter) via phone.    Current Length of Stay: 2 day(s)  Current Patient Status: Inpatient   Certification Statement: The patient will continue to require additional inpatient hospital stay due to ams  Discharge Plan: Anticipate discharge in 24-48 hrs to tbd    Code Status: Level 1 - Full Code    Subjective   Berta was seen and examined at bedside.  No acute events overnight.  Discussed plan of care.  All questions and concerns were answered and addressed.  Has no acute complaints at this time.  Continues to have fluctuating moods and liability.  Discussed with neuro will patient thyroid profile abnormal placed further workup    Objective :  Temp:  [97.6 °F (36.4 °C)-98.2 °F (36.8 °C)] 98.2 °F (36.8 °C)  HR:  [74-97] 74  BP: (101-122)/(65-79) 122/79  Resp:  [18] 18  SpO2:  [94 %-97 %] 97 %  O2 Device: None (Room air)    Body mass index is 16.3 kg/m².     Input and Output Summary (last 24 hours):     Intake/Output Summary (Last 24 hours) at 4/6/2025 1406  Last data filed at 4/6/2025 1306  Gross  per 24 hour   Intake 1020 ml   Output 1650 ml   Net -630 ml       Physical Exam  Vitals and nursing note reviewed.   Constitutional:       General: She is not in acute distress.     Appearance: She is not ill-appearing.      Comments: Thin frail   HENT:      Head: Normocephalic and atraumatic.   Cardiovascular:      Rate and Rhythm: Normal rate and regular rhythm.      Pulses: Normal pulses.      Heart sounds: Normal heart sounds.   Pulmonary:      Effort: Pulmonary effort is normal.      Breath sounds: Normal breath sounds.   Abdominal:      General: Abdomen is flat. Bowel sounds are normal.      Palpations: Abdomen is soft.   Musculoskeletal:      Right lower leg: No edema.      Left lower leg: No edema.   Skin:     General: Skin is warm.   Neurological:      General: No focal deficit present.      Mental Status: She is alert and oriented to person, place, and time.      Comments: Rude, gets off topic and says random things unrelated to the conversation that was being held           Lines/Drains:  Lines/Drains/Airways       Active Status       Name Placement date Placement time Site Days    Urethral Catheter Straight-tip 16 Fr. 04/02/25  1700  Straight-tip  3                  Urinary Catheter:  Goal for removal: N/A- Discharging with Dietrich                 Lab Results: I have reviewed the following results:   Results from last 7 days   Lab Units 04/04/25  0145   WBC Thousand/uL 7.80   HEMOGLOBIN g/dL 15.0   HEMATOCRIT % 45.3   PLATELETS Thousands/uL 314   SEGS PCT % 56   LYMPHO PCT % 32   MONO PCT % 6   EOS PCT % 4     Results from last 7 days   Lab Units 04/06/25  0443 04/05/25  0604 04/04/25  0145   SODIUM mmol/L 133*   < > 134*   POTASSIUM mmol/L 4.4   < > 4.5   CHLORIDE mmol/L 100   < > 96   CO2 mmol/L 28   < > 29   BUN mg/dL 14   < > 6   CREATININE mg/dL 0.41*   < > 0.45*   ANION GAP mmol/L 5   < > 9   CALCIUM mg/dL 8.9   < > 9.7   ALBUMIN g/dL  --   --  4.3   TOTAL BILIRUBIN mg/dL  --   --  0.88   ALK PHOS  U/L  --   --  79   ALT U/L  --   --  12   AST U/L  --   --  18   GLUCOSE RANDOM mg/dL 87   < > 98    < > = values in this interval not displayed.     Results from last 7 days   Lab Units 04/04/25  0145   INR  0.97             Results from last 7 days   Lab Units 04/04/25 0145   LACTIC ACID mmol/L 0.6   PROCALCITONIN ng/ml <0.05       Recent Cultures (last 7 days):   Results from last 7 days   Lab Units 04/04/25 0145   BLOOD CULTURE  No Growth at 48 hrs.  No Growth at 48 hrs.       Imaging Results Review: No pertinent imaging studies reviewed.  Other Study Results Review: No additional pertinent studies reviewed.    Last 24 Hours Medication List:     Current Facility-Administered Medications:     acetaminophen (TYLENOL) tablet 650 mg, Q6H PRN    aluminum-magnesium hydroxide-simethicone (MAALOX) oral suspension 30 mL, Q6H PRN    hydrOXYzine HCL (ATARAX) tablet 25 mg, Q6H PRN    ondansetron (ZOFRAN) injection 4 mg, Q4H PRN    senna (SENOKOT) tablet 8.6 mg, HS PRN    thiamine (VITAMIN B1) 200 mg in sodium chloride 0.9 % 50 mL IVPB, TID, Last Rate: 200 mg (04/06/25 0917) **FOLLOWED BY** [START ON 4/12/2025] thiamine (VITAMIN B1) 250 mg in sodium chloride 0.9 % 50 mL IVPB, Daily    Administrative Statements   Today, Patient Was Seen By: Swetha Baker MD  I have spent a total time of 56 minutes in caring for this patient on the day of the visit/encounter including Diagnostic results, Prognosis, Risks and benefits of tx options, Instructions for management, Patient and family education, Importance of tx compliance, Risk factor reductions, Impressions, Counseling / Coordination of care, Documenting in the medical record, Reviewing/placing orders in the medical record (including tests, medications, and/or procedures), Obtaining or reviewing history  , and Communicating with other healthcare professionals .    **Please Note: This note may have been constructed using a voice recognition system.**

## 2025-04-06 NOTE — MALNUTRITION/BMI
This medical record reflects one or more clinical indicators suggestive of malnutrition and underweight.    Malnutrition Findings:   Adult Malnutrition type: Chronic illness  Adult Degree of Malnutrition: Other severe protein calorie malnutrition  Malnutrition Characteristics: Muscle loss, Fat loss                360 Statement: Chronic/severe malnutrition r/t inadequate intake, as evidenced by severe fat loss (orbitals) and muscle mass depletion (temporal, clavicle, shoulder). Treated with liberal PO diet + oral nutrition supplements    BMI Findings:  Adult BMI Classifications: Underweight < 18.5        Body mass index is 16.3 kg/m².     See Nutrition note dated 4/6/25 for additional details.  Completed nutrition assessment is viewable in the nutrition documentation.

## 2025-04-06 NOTE — PLAN OF CARE
Problem: Potential for Falls  Goal: Patient will remain free of falls  Description: INTERVENTIONS:- Educate patient/family on patient safety including physical limitations- Instruct patient to call for assistance with activity - Consult OT/PT to assist with strengthening/mobility - Keep Call bell within reach- Keep bed low and locked with side rails adjusted as appropriate- Keep care items and personal belongings within reach- Initiate and maintain comfort rounds- Make Fall Risk Sign visible to staff- Offer Toileting every *** Hours, in advance of need- Initiate/Maintain ***alarm- Obtain necessary fall risk management equipment: ***- Apply yellow socks and bracelet for high fall risk patients- Consider moving patient to room near nurses station  Outcome: Progressing

## 2025-04-06 NOTE — PROGRESS NOTES
"Neurology - Progress Note  Berta Smart 59 y.o. female MRN: [id number]  Unit/Bed#: -01 Encounter: [id number]    Assessment:  Delirium appears resolved.  At time my exam she does not have any encephalopathy.  She has been attentive.  Initially when I had seen her in the emergency room room she was inattentive more erratic and had not been sleeping well perhaps getting better sleep in the hospital has helped.  She is calm remembers me asking me appropriate questions and following commands appropriately.  MRI brain with and without contrast does not show any concerning findings nothing suggesting acute or chronic infarcts, no brain masses, no inflammatory concerns.  Stronger suspicion for undiagnosed psychiatric conditions especially given her family history.  She is also had some symptoms that have become more pronounced since stopping alcohol couple months ago likely she was coping with alcohol.  She does not have any withdrawal symptoms currently.  Also clinically does not appear to be a Warnicke's encephalopathy and there is no enhancement in the mamillary bodies on the MRI.    Plan:  No additional neurologic recommendations.  No need for lumbar puncture.  Would recommend psychiatric consultation  Continue thiamine, high-dose is not needed can change back down to 100 mg daily  She has an elevated thryoid microsomal antibody however tsh wnl. Unclear what to make of this finding. Lower suspicion for hashimoto's thyroiditis clinically. Will order T3, T4, thyroglobulin antibody, thyroid stimulating immunoglobulin  Serum autoimmune encephalopathy results pending    ROS:  Per 12 point review, stress, anxiety, some abdominal region discomfort, rest unremarkable    Vitals: Blood pressure 122/79, pulse 74, temperature 98.2 °F (36.8 °C), temperature source Temporal, resp. rate 18, height 5' 6\" (1.676 m), weight 45.8 kg (100 lb 15.5 oz), SpO2 97%.,Body mass index is 16.3 kg/m².    Physical Exam:    General appearance: " alert, appears stated age and cooperative  Head: Normocephalic, without obvious abnormality, atraumatic  Extremities: extremities normal, atraumatic, no cyanosis or edema. Decreased bulk throughout.    Neurologic: Mental status: Alert, orientedX3, thought content appropriate. Attention, concentration intact. Making appropriate eye contact. No expressive/receptive aphasia.  CN 2-12: intact  Motor: normal tone and decreased bulk throughout. No involuntary movements at rest or activation. 5 power ue/le bilat.  Sensory: light touch intact ue/le bilat  Cerebellar: finger to nose, heel to shin no dysmetria or ataxia ue/le bilat aside for slight past pointing at times with either UE however states she has impaired baseline vision.  DTR's: 2 ue/le bilat                Lab, Imaging and other studies: I have personally reviewed pertinent reports.

## 2025-04-06 NOTE — PLAN OF CARE
Problem: Potential for Falls  Goal: Patient will remain free of falls  Description: INTERVENTIONS:- Educate patient/family on patient safety including physical limitations- Instruct patient to call for assistance with activity - Consult OT/PT to assist with strengthening/mobility - Keep Call bell within reach- Keep bed low and locked with side rails adjusted as appropriate- Keep care items and personal belongings within reach- Initiate and maintain comfort rounds- Make Fall Risk Sign visible to staff- Offer Toileting every 2 Hours, in advance of need- Initiate/Maintain alarm- Obtain necessary fall risk management equipment: - Apply yellow socks and bracelet for high fall risk patients- Consider moving patient to room near nurses station  Outcome: Progressing     Problem: Prexisting or High Potential for Compromised Skin Integrity  Goal: Skin integrity is maintained or improved  Description: INTERVENTIONS:- Identify patients at risk for skin breakdown- Assess and monitor skin integrity- Assess and monitor nutrition and hydration status- Monitor labs - Assess for incontinence - Turn and reposition patient- Assist with mobility/ambulation- Relieve pressure over bony prominences- Avoid friction and shearing- Provide appropriate hygiene as needed including keeping skin clean and dry- Evaluate need for skin moisturizer/barrier cream- Collaborate with interdisciplinary team - Patient/family teaching- Consider wound care consult   Outcome: Progressing     Problem: PAIN - ADULT  Goal: Verbalizes/displays adequate comfort level or baseline comfort level  Description: Interventions:- Encourage patient to monitor pain and request assistance- Assess pain using appropriate pain scale- Administer analgesics based on type and severity of pain and evaluate response- Implement non-pharmacological measures as appropriate and evaluate response- Consider cultural and social influences on pain and pain management- Notify  physician/advanced practitioner if interventions unsuccessful or patient reports new pain  Outcome: Progressing     Problem: DISCHARGE PLANNING  Goal: Discharge to home or other facility with appropriate resources  Description: INTERVENTIONS:- Identify barriers to discharge w/patient and caregiver- Arrange for needed discharge resources and transportation as appropriate- Identify discharge learning needs (meds, wound care, etc.)- Arrange for interpretive services to assist at discharge as needed- Refer to Case Management Department for coordinating discharge planning if the patient needs post-hospital services based on physician/advanced practitioner order or complex needs related to functional status, cognitive ability, or social support system  Outcome: Progressing     Problem: Knowledge Deficit  Goal: Patient/family/caregiver demonstrates understanding of disease process, treatment plan, medications, and discharge instructions  Description: Complete learning assessment and assess knowledge base.Interventions:- Provide teaching at level of understanding- Provide teaching via preferred learning methods  Outcome: Progressing

## 2025-04-07 PROBLEM — F43.25 ADJUSTMENT DISORDER WITH MIXED DISTURBANCE OF EMOTIONS AND CONDUCT: Status: ACTIVE | Noted: 2025-04-07

## 2025-04-07 PROCEDURE — 99232 SBSQ HOSP IP/OBS MODERATE 35: CPT | Performed by: INTERNAL MEDICINE

## 2025-04-07 PROCEDURE — 99254 IP/OBS CNSLTJ NEW/EST MOD 60: CPT | Performed by: STUDENT IN AN ORGANIZED HEALTH CARE EDUCATION/TRAINING PROGRAM

## 2025-04-07 RX ADMIN — ONDANSETRON 4 MG: 2 INJECTION INTRAMUSCULAR; INTRAVENOUS at 22:05

## 2025-04-07 RX ADMIN — THIAMINE HYDROCHLORIDE 200 MG: 100 INJECTION, SOLUTION INTRAMUSCULAR; INTRAVENOUS at 19:53

## 2025-04-07 RX ADMIN — ACETAMINOPHEN 650 MG: 325 TABLET, FILM COATED ORAL at 22:05

## 2025-04-07 NOTE — NURSING NOTE
"Patient called this nurse into her room, stating she wants to leave, and stating \"I'm just going to walk out\". Reviewed with patient that she needs to speak with provider first and have her IV removed before she can leave. Patient appears very frustrated. Provider made aware. Provider spoke with daughter Mayra, who is planning to call the patient and attempt to help her relax and change her mind about wanting to leave .  "

## 2025-04-07 NOTE — CONSULTS
TELEConsultation - Behavioral Health   eBrta Smart 59 y.o. female MRN: [id number]  Unit/Bed#: -01 Encounter: [id number]  Hospital Day: 3    VIRTUAL CARE DOCUMENTATION:     1. This service was provided via Telemedicine using: Teams Virtual Rounding      2. Parties in the room with patient during teleconsult: Patient only    3. Confidentiality: My office door was closed     4. Participants: No one else was in the room    5. Patient acknowledged consent and understanding of privacy and security of the  Telemedicine consult. I informed the patient that I have reviewed their record in Epic and presented the opportunity for them to ask any questions regarding the visit today.  The patient agreed to participate.    6. I have spent a total time of 60 minutes in caring for this patient on the day of the visit/encounter, NOT including the time spent for establishing the audio/video connection, but including Diagnostic results, Prognosis, Risks and benefits of tx options, Instructions for management, Patient and family education including obtaining collateral information, Importance of tx compliance, Risk factor reductions, Assessment & Impressions, Counseling / Coordination of care, Documenting in the medical record, Reviewing / ordering tests, medicine, and procedures  , Obtaining or reviewing history  , and Communicating with other healthcare professionals.       Assessment & Plan     Assessment: 59-year-old woman with unclear history of mood disorder presents to the hospital with changes in behavior and confusion.  Reportedly stopped drinking alcohol several months ago and has had bizarre behaviors for the past 4 weeks.  Encephalopathic and delirious on hospitalization but this seems to have resolved.  On my interview she was organized in thought process and behavior and was alert and oriented to person, place, time, situation.  She did appear to have poor memory and recall about recent events.  She denies any  trouble with her mood and was organized on my interview, not meeting criteria for any mood or psychotic episode at this time.  She did endorse some irritability.  Seem to be minimizing her memory difficulties.  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  Her subthreshold mood symptoms such as irritability and some mood lability could be part of an underlying mood disorder but she does not meet criteria for mood episode at this time.  If patient is amenable, she may be followed by outpatient physicians for progression of mood symptoms but at this time the patient does not meet inpatient psychiatric hospitalization criteria either via 201 or 302.  She has not demonstrated any aggressive behaviors or dangerous behaviors warranting involuntary psychiatric hospitalization.  I do not recommend any psychotropic medications at this time.  Would recommend neuropsychiatric evaluation for capacity as it appears that the family has questions about the patient's ability to make medical decisions.      Assessment & Plan  Encephalopathy  --Seems to have resolved.  Patient is alert and oriented.  Alcoholism (HCC)  --Recommend continuing thiamine treatment  Adjustment disorder with mixed disturbance of emotions and conduct  --Patient does not meet criteria for current mood or psychotic episode at this time.  No inpatient psychiatric hospitalization criteria.  --No 302 criteria at this time.  Patient denies SI/HI/AVH  --Patient does appear to have poor insight and cognition and warrants neuropsychiatric evaluation for capacity and competency to make medical decisions       Diagnoses, available treatment options, alternatives to treatment, and risks vs. benefits of current psychiatric treatment plan were discussed with the patient.  Prior records were reviewed in [employer].  The case was discussed with the primary team.      History of Present Illness  "  Physician Requesting Consult: Jadon Kaplan MD    Chief Complaint: \"I just want to go home\"    Reason for Consult / Principal Problem: Psychiatric evaluation for irritability, mood lability    59-year-old woman with medical history of colon resection with colostomy and a history of alcohol use disorder who presents with confusion and erratic behavior for the 2 days prior to arrival.  Her daughter reports that her mother began acting childish and beginning to get upset about simple things.  Daughter reports that 3 to 4 weeks ago she noticed a significant change in her mother's behavior, with confusion and abnormal behaviors.  Daughter reports the patient stopped drinking 2 months ago after drinking daily for several years.    During hospitalization, patient was encephalopathic and delirious which reportedly resolved.  MRI brain did not show any concerning findings suggesting acute or chronic infarcts, masses, or inflammatory concerns.  No enhancement of mamillary bodies on MRI.  Patient treated with thiamine.      Psychiatry consulted for evaluation of potential mood or psychotic disorder.    On evaluation,    Patient was calm and cooperative with interview.  She reports that she feels feels annoyed and irritable because she is not able to leave the hospital.  She reports that she is in the hospital for treatment of her colostomy care.  Seems to have poor insight into the reason she was brought into the hospital.  However, she was calm and cooperative with my interview.  She denies any trouble with her mood, sleep, appetite, energy or concentration.  She was alert and oriented to person, place, time, situation.  Acknowledges some irritability and acting rude which she attributes to not being allowed to leave the hospital.  She reports that she is amenable to coordination of her outpatient treatment prior to discharge and she is willing to wait until a safe discharge plan is coordinated.  She is not interested " in psychiatric treatment at this time denies any problem with her mood or behaviors.  Denies suicidal ideation, intention, or plan.  Denies HI or AVH.  She was not internally preoccupied or responding to internal stimuli on interview.  Patient reports that she has been hospitalized for psychiatric problem several times in the past but not for many years.  She was unable to clearly articulate why she was hospitalized other than reported she was held against her will.  Denies any recent psychotropic medications.  She did appear to have some impaired insight into her current situation and symptoms.  When I attempted to discuss her alcohol use with her she became irritated but reports that she stopped drinking many years ago and feels that her memory and cognition have improved since cessation of alcohol.    Psychiatric Review Of Systems:  Medication side effects: none  Sleep: no change  Appetite: no change  Hygiene: able to tend to instrumental and basic ADLs  Anxiety Symptoms: denies  Psychotic Symptoms: denies  Depression Symptoms: denies  Manic Symptoms: denies  PTSD Symptoms: denies  Suicidal Thoughts: denies  Homicidal Thoughts: denies    Historical Information   Psychiatric History:   Diagnoses: Mood disorder  Inpatient Hx: Patient reports multiple psychiatric stays for mood instability  Outpatient Hx: Unknown  Medications/Trials: Patient not able to meaningfully discuss previous medications    Substance Abuse History:    Social History     Substance and Sexual Activity   Alcohol Use Yes    Comment: drinks beer     Social History     Substance and Sexual Activity   Drug Use Not Currently    Comment: Denies use       I discussed substance abuse with the patient and, if pertinent, discussed risks vs benefits of decreasing frequency of use.    Family History:   Family History   Problem Relation Age of Onset    Heart disease Mother     Stroke Mother     Hypertension Mother     Kidney disease Mother     No Known  Problems Father     No Known Problems Sister     No Known Problems Brother        Social History  Rest of social history as per below:  Social History     Socioeconomic History    Marital status: /Civil Union     Spouse name: Not on file    Number of children: Not on file    Years of education: Not on file    Highest education level: Not on file   Occupational History    Not on file   Tobacco Use    Smoking status: Every Day     Types: Cigarettes    Smokeless tobacco: Current   Vaping Use    Vaping status: Never Used   Substance and Sexual Activity    Alcohol use: Yes     Comment: drinks beer    Drug use: Not Currently     Comment: Denies use    Sexual activity: Not Currently   Other Topics Concern    Not on file   Social History Narrative    Not on file     Social Drivers of Health     Financial Resource Strain: Not on file   Food Insecurity: Food Insecurity Present (4/4/2025)    Nursing - Inadequate Food Risk Classification     Worried About Running Out of Food in the Last Year: Not on file     Ran Out of Food in the Last Year: Not on file     Ran Out of Food in the Last Year: Sometimes true   Transportation Needs: No Transportation Needs (4/4/2025)    Nursing - Transportation Risk Classification     Lack of Transportation: Not on file     Lack of Transportation: No   Physical Activity: Inactive (7/30/2020)    Exercise Vital Sign     Days of Exercise per Week: 0 days     Minutes of Exercise per Session: 0 min   Stress: Stress Concern Present (7/30/2020)    [place] [employer] - Occupational Stress Questionnaire     Feeling of Stress : Very much   Social Connections: Moderately Isolated (7/30/2020)    Social Connection and Isolation Panel [NHANES]     Frequency of Communication with Friends and Family: More than three times a week     Frequency of Social Gatherings with Friends and Family: Never     Attends Alevism Services: Never     Active Member of Clubs or Organizations: No      Attends Club or Organization Meetings: Never     Marital Status:    Intimate Partner Violence: Unknown (2025)    Nursing IPS     Feels Physically and Emotionally Safe: Not on file     Physically Hurt by Someone: Not on file     Humiliated or Emotionally Abused by Someone: Not on file     Physically Hurt by Someone: No     Hurt or Threatened by Someone: No   Housing Stability: At Risk (2025)    Nursing: Inadequate Housing Risk Classification     Has Housing: Not on file     Worried About Losing Housing: Not on file     Unable to Get Utilities: Not on file     Unable to Pay for Housing in the Last Year: Yes     Has Housin       Past Medical History:   Diagnosis Date    Alcoholism (HCC)     Depression     Hypothyroidism     PTSD (post-traumatic stress disorder)        Meds/Allergies   No Known Allergies    Current Facility-Administered Medications:     acetaminophen (TYLENOL) tablet 650 mg, Q6H PRN    aluminum-magnesium hydroxide-simethicone (MAALOX) oral suspension 30 mL, Q6H PRN    hydrOXYzine HCL (ATARAX) tablet 25 mg, Q6H PRN    melatonin tablet 6 mg, HS PRN    ondansetron (ZOFRAN) injection 4 mg, Q4H PRN    senna (SENOKOT) tablet 8.6 mg, HS PRN    thiamine (VITAMIN B1) 200 mg in sodium chloride 0.9 % 50 mL IVPB, TID, Last Rate: 100 mL/hr at 25 0507 **FOLLOWED BY** [START ON 2025] thiamine (VITAMIN B1) 250 mg in sodium chloride 0.9 % 50 mL IVPB, Daily    Current Medications:  Current medications as per above. All medications have been reviewed.   Risks, benefits, alternatives, and possible side effects of patient's psychiatric medications were discussed with patient.     Objective   Vital signs in last 24 hours:  Temp:  [97.2 °F (36.2 °C)-97.6 °F (36.4 °C)] 97.2 °F (36.2 °C)  HR:  [70-82] 70  BP: (115-121)/(70-81) 115/74  Resp:  [16-19] 19  SpO2:  [98 %-100 %] 99 %  O2 Device: None (Room air)    Mental Status Exam:  Appearance: casually dressed, consistent with stated age  Motor: no  psychomotor retardation, no gait abnormalities  Behavior: cooperative, answers questions appropriately  Speech: soft, normal rhythm  Mood: annoyed  Affect: constricted,, irritable  Thought Process: linear and goal-oriented  Thought Content: denies delusions  Risk Potential: denies suicidal ideation, plan, or intent. Denies homicidal ideation  Perceptions: denies auditory hallucinations, denies visual hallucinations,   Sensorium: Oriented to person, place, time, and situation  Cognition: cognitive ability appears mildly impaired but was not quantitatively tested  Consciousness: alert and awake  Attention: intact, able to focus without difficulty  Insight: limited   Judgement: limited      Laboratory results:  I have personally reviewed all pertinent laboratory/tests results.  Recent Results (from the past 48 hours)   Basic metabolic panel    Collection Time: 04/06/25  4:43 AM   Result Value Ref Range    Sodium 133 (L) 135 - 147 mmol/L    Potassium 4.4 3.5 - 5.3 mmol/L    Chloride 100 96 - 108 mmol/L    CO2 28 21 - 32 mmol/L    ANION GAP 5 4 - 13 mmol/L    BUN 14 5 - 25 mg/dL    Creatinine 0.41 (L) 0.60 - 1.30 mg/dL    Glucose 87 65 - 140 mg/dL    Calcium 8.9 8.4 - 10.2 mg/dL    eGFR 112 ml/min/1.73sq m   Magnesium    Collection Time: 04/06/25  4:43 AM   Result Value Ref Range    Magnesium 2.0 1.9 - 2.7 mg/dL   T4    Collection Time: 04/06/25  4:43 AM   Result Value Ref Range    T4 TOTAL 7.54 6.09 - 12.23 ug/dL   T3    Collection Time: 04/06/25  4:43 AM   Result Value Ref Range    T3, Total 1.2 0.9-1.8 ng/mL ng/mL        Lucas Madrid MD    This note has been constructed using a voice recognition system. There may be translation, syntax, or grammatical errors. If you have any questions, please contact the dictating provider.

## 2025-04-07 NOTE — PHYSICAL THERAPY NOTE
PHYSICAL THERAPY SCREEN NOTE          Patient Name: Berta Smart  Today's Date: 4/7/2025 04/07/25 5644   Note Type   Note type Screen   Additional Comments PT orders received and chart review was performed. Per RN, pt mobilized >300 feet with RW and mod I. Pt does not have any concerns regarding mobility. Pt is mobilizing at baseline and does not possess any acute PT needs, will be cleared from PT caseload.     Dennis Doty, SPT

## 2025-04-07 NOTE — PLAN OF CARE
Problem: Potential for Falls  Goal: Patient will remain free of falls  Description: INTERVENTIONS:- Educate patient/family on patient safety including physical limitations- Instruct patient to call for assistance with activity - Consult OT/PT to assist with strengthening/mobility - Keep Call bell within reach- Keep bed low and locked with side rails adjusted as appropriate- Keep care items and personal belongings within reach- Initiate and maintain comfort rounds- Make Fall Risk Sign visible to staff- Offer Toileting every 2 Hours, in advance of need- Initiate/Maintain alarm- Obtain necessary fall risk management equipment: - Apply yellow socks and bracelet for high fall risk patients- Consider moving patient to room near nurses station  Outcome: Progressing     Problem: PAIN - ADULT  Goal: Verbalizes/displays adequate comfort level or baseline comfort level  Description: Interventions:- Encourage patient to monitor pain and request assistance- Assess pain using appropriate pain scale- Administer analgesics based on type and severity of pain and evaluate response- Implement non-pharmacological measures as appropriate and evaluate response- Consider cultural and social influences on pain and pain management- Notify physician/advanced practitioner if interventions unsuccessful or patient reports new pain  Outcome: Progressing

## 2025-04-07 NOTE — CONSULTS
Consultation - Neuropsychology/Psychology Department  Berta Smart 59 y.o. female MRN: [id number]  Unit/Bed#: -01 Encounter: [id number]        Reason for Consultation:  Berta Smart is a 59 y.o. year old female who was referred for a Neuropsychological Exam to assess cognitive functioning and comment on capacity to make informed medical decisions.      History of Present Illness  Confusion and erratic behavior per family    Physician Requesting Consult: Jadon Kaplan MD    PROBLEM LIST:  Patient Active Problem List   Diagnosis    Colostomy status (HCC)    KRAIG (generalized anxiety disorder)    Current mild episode of major depressive disorder without prior episode (HCC)    Tobacco use disorder    Alcoholism (HCC)    Body mass index (BMI) 19.9 or less, adult    SOB (shortness of breath)    Abnormal CXR    Colostomy in place (HCC)    Encephalopathy    Urinary retention    Severe protein-calorie malnutrition (HCC)    Adjustment disorder with mixed disturbance of emotions and conduct         Historical Information   Past Medical History:   Diagnosis Date    Alcoholism (HCC)     Depression     Hypothyroidism     PTSD (post-traumatic stress disorder)      Past Surgical History:   Procedure Laterality Date    COLOSTOMY      CYSTOSCOPY W/ URETERAL STENT PLACEMENT Left     EXPLORATORY LAPAROTOMY      SALPINGECTOMY Bilateral 02/2025     Social History   Social History     Substance and Sexual Activity   Alcohol Use Yes    Comment: drinks beer     Social History     Substance and Sexual Activity   Drug Use Not Currently    Comment: Denies use     Social History     Tobacco Use   Smoking Status Every Day    Types: Cigarettes   Smokeless Tobacco Current     Family History:   Family History   Problem Relation Age of Onset    Heart disease Mother     Stroke Mother     Hypertension Mother     Kidney disease Mother     No Known Problems Father     No Known Problems Sister     No Known Problems Brother        Meds/Allergies    current meds:   Current Facility-Administered Medications:     acetaminophen (TYLENOL) tablet 650 mg, Q6H PRN    aluminum-magnesium hydroxide-simethicone (MAALOX) oral suspension 30 mL, Q6H PRN    hydrOXYzine HCL (ATARAX) tablet 25 mg, Q6H PRN    melatonin tablet 6 mg, HS PRN    ondansetron (ZOFRAN) injection 4 mg, Q4H PRN    senna (SENOKOT) tablet 8.6 mg, HS PRN    thiamine (VITAMIN B1) 200 mg in sodium chloride 0.9 % 50 mL IVPB, TID, Last Rate: 100 mL/hr at 04/07/25 0507 **FOLLOWED BY** [START ON 4/12/2025] thiamine (VITAMIN B1) 250 mg in sodium chloride 0.9 % 50 mL IVPB, Daily    No Known Allergies      Family and Social Support:   No data recorded    Behavioral Observations: Patient was alert, oriented  x 3 and cooperative; affect appeared pleasant, demonstrated use of humor during exam and admitted to mild depression and anxiety; no overt evidence of psychotic process or confused thought process; patient appeared aware of reason for hospitalization and medical history;    Cognitive Examination    General Cognitive Functioning MMSE = Deficits in recall and working memory    Attention/Concentration Auditory Selective Attention = Average; Auditory Vigilance = Within Normal Limits; Information Processing Speed = Within Normal Limits    Frontal Systems/Executive Functioning Mental Flexibility/Cognitive Control = Within Normal Limits; Working Memory = Impaired Abstract Reasoning = Within Normal Limits; Generative Ability = Within Normal Limits,     Language Functioning Phonemic Fluency = Within Normal Limits; Semantic Retrieval = Within Normal Limits; Comprehension of Complex Ideational Material = Within Normal Limits;     Memory Functioning Narrative Recall - Short Delay = Impaired; Long Delay Narrative Recall = Impaired; Three word recall = Impaired    Visuo-Spatial Abilities Not Assessed    Functional Knowledge  Health & Safety Knowledge = Within Normal Limits;     Summary/Impression:  Results of  Neuropsychological Exam revealed cognitive deficits in working memory and recall with all other tested areas within normal limits. On a measure assessing awareness of personal health status and ability to evaluate health problems, handle medical emergencies and take safety precautions, patient performed within normal limits. During this encounter, patient appears to have capacity to make informed medical decisions.

## 2025-04-07 NOTE — NURSING NOTE
"Pt still refusing bloodwork, flushing of IV with NS, and all meds.  Pt stated she didn't want to talk to any more doctors stating, \"You guys are trying to make me look like a drug addict or something.  You're going to try to keep me in here forever and prove it with the bloodwork.\"      This RN educated the pt on the reason for labs and the importance of having up-to-date information to help make informed decisions regarding her care as a whole.  Pt still refused saying, \"Brea Mcgrath.  See you later!\"  "

## 2025-04-07 NOTE — OCCUPATIONAL THERAPY NOTE
Occupational Therapy Screen Note     Patient Name: Berta Smart  Today's Date: 4/7/2025  Problem List  Principal Problem:    Encephalopathy  Active Problems:    Colostomy status (HCC)    Alcoholism (HCC)    Urinary retention    Severe protein-calorie malnutrition (HCC)    Adjustment disorder with mixed disturbance of emotions and conduct              04/07/25 1402   OT Last Visit   OT Visit Date 04/07/25   Note Type   Note type Screen   Additional Comments OT orders received, chart reviewed. Spoke with RN who reports pt mobilized >300 ft, Mod I with RW, completing ADLs independently. No acute OT needs indicated, DC OT orders       Paula Hernandes OTR/L

## 2025-04-07 NOTE — NURSING NOTE
Pt refusing AM blood work, stating she wants to go home and believes that if labs are drawn, she will not be d/c. This nurse attempted to educate the pt on the importance of lab wok for her care, however she did not appear to understand and became irritable, stating she will leave AMA. SLIM notified. Pt remains in bed with bed alarm on for safety.

## 2025-04-07 NOTE — ASSESSMENT & PLAN NOTE
--Patient does not meet criteria for current mood or psychotic episode at this time.  No inpatient psychiatric hospitalization criteria.  --No 302 criteria at this time.  Patient denies SI/HI/AVH  --Patient does appear to have poor insight and cognition and warrants neuropsychiatric evaluation for capacity and competency to make medical decisions

## 2025-04-07 NOTE — TELEPHONE ENCOUNTER
Spoke with Pts daughter and she has asked for a psych eval at [facility] and a  and has applied for medicaid

## 2025-04-07 NOTE — ASSESSMENT & PLAN NOTE
Found to have urinary retention during recent ED visit on 4/2  Continue Dietrich  Voiding trial today.

## 2025-04-07 NOTE — ASSESSMENT & PLAN NOTE
Presenting to the ED with increasing confusion and erratic behavior per family  On review of ED documentation from 4/2 appeared to have intermittent confusion and abnormal responses then as well  History of alcoholism in the past  Patient does have significant confabulations at time of admission, otherwise is oriented to self, place, month, and corrected herself on the year  Workup relatively unrevealing in the ED including labs and CTh  Concern for Wernicke encephalopathy-continue high-dose thiamine  Daughter had expressed concern about ability for the patient to stay at home-CM consulted for possible placement  anti-microsomal antibody, 377-> thyroid Ab panel anti thyroglobulin ab t3 and t4 pending  serum autoimmune encephalopathy profile,   vitamin B12 WNL  vitamin B1-> will skewed d/t pt receiving high dose thiamine   vitamin B6   MRI showing no acute pathology  Discussed with neuro unlikely to be infectious in nature also seems that the pt was self medicating with alcohol, seems like it may be more behavioral  Psychiatry consult patient  Psychiatry recommended no inpatient BHU needs and recommended neuropsych evaluation

## 2025-04-07 NOTE — PROGRESS NOTES
Progress Note - Hospitalist   Name: Berta Smart 59 y.o. female I MRN: [id number]  Unit/Bed#: -01 I Date of Admission: 4/4/2025   Date of Service: 4/7/2025 I Hospital Day: 3    Assessment & Plan  Encephalopathy  Presenting to the ED with increasing confusion and erratic behavior per family  On review of ED documentation from 4/2 appeared to have intermittent confusion and abnormal responses then as well  History of alcoholism in the past  Patient does have significant confabulations at time of admission, otherwise is oriented to self, place, month, and corrected herself on the year  Workup relatively unrevealing in the ED including labs and CTh  Concern for Wernicke encephalopathy-continue high-dose thiamine  Daughter had expressed concern about ability for the patient to stay at home-CM consulted for possible placement  anti-microsomal antibody, 377-> thyroid Ab panel anti thyroglobulin ab t3 and t4 pending  serum autoimmune encephalopathy profile,   vitamin B12 WNL  vitamin B1-> will skewed d/t pt receiving high dose thiamine   vitamin B6   MRI showing no acute pathology  Discussed with neuro unlikely to be infectious in nature also seems that the pt was self medicating with alcohol, seems like it may be more behavioral  Psychiatry consult patient  Psychiatry recommended no inpatient BHU needs and recommended neuropsych evaluation  Urinary retention  Found to have urinary retention during recent ED visit on 4/2  Continue Dietrich  Voiding trial today.  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 Treated with liberal PO diet + oral nutrition supplements    BMI Findings:  Adult BMI Classifications: Underweight < 18.5        Body mass index is 16.3 kg/m².     Adjustment disorder with mixed disturbance of emotions and conduct      Labs & Imaging: Results Review Statement: No pertinent imaging studies reviewed.    VTE Prophylaxis: in place.    Code Status:   Level 1 - Full Code    Patient Centered Rounds: I have performed bedside rounds with nursing staff today.    Mobility:   Basic Mobility Inpatient Raw Score: 15  JH-HLM Goal: 4: Move to chair/commode  JH-HLM Achieved: 3: Sit at edge of bed (Pt refusing any additional intervention)  JH-HLM Goal NOT achieved. Continue with multidisciplinary rounding and encourage appropriate mobility to improve upon JH-HLM goals.    Discussions with Specialists or Other Care Team Provider: RN    Education and Discussions with Family / Patient: Daughter    Total Time Spent on Date of Encounter in care of patient: 35 mins. This time was spent on one or more of the following: performing physical exam; counseling and coordination of care; obtaining or reviewing history; documenting in the medical record; reviewing/ordering tests, medications or procedures; communicating with other healthcare professionals and discussing with patient's family/caregivers.    Current Length of Stay: 3 day(s)    Current Patient Status: Inpatient   Certification Statement: The patient will continue to require additional inpatient hospital stay due to see my assessment and plan.     Subjective:   Patient is seen and examined at bedside.  No new complaints.   "Afebrile  All other ROS are negative.    Objective:    Vitals: Blood pressure 115/74, pulse 70, temperature (!) 97.2 °F (36.2 °C), resp. rate 19, height 5' 6\" (1.676 m), weight 45.8 kg (100 lb 15.5 oz), SpO2 99%.,Body mass index is 16.3 kg/m².  SPO2 RA Rest      Flowsheet Row ED to Hosp-Admission (Current) from 4/4/2025 in [facility] Med [facility]urg Unit   SpO2 99 %   SpO2 Activity At Rest   O2 Device None (Room air)   O2 Flow Rate --          I&O:   Intake/Output Summary (Last 24 hours) at 4/7/2025 1348  Last data filed at 4/7/2025 1313  Gross per 24 hour   Intake 940 ml   Output 3025 ml   Net -2085 ml       Physical Exam:    General- Alert, lying comfortably in bed. Not in any acute distress.  Neck- Supple, No JVD  CVS- regular, S1 and S2 normal  Chest- Bilateral Air entry, No rhochi, crackles or wheezing present.  Abdomen- soft, nontender, not distended, no guarding or rigidity, BS+  Extremities-  No pedal edema, No calf tenderness  CNS-   Alert, awake. No focal deficits present.    Invasive Devices       Peripheral Intravenous Line  Duration             Peripheral IV 04/04/25 Right Antecubital 3 days                          Social History  reviewed  Family History   Problem Relation Age of Onset    Heart disease Mother     Stroke Mother     Hypertension Mother     Kidney disease Mother     No Known Problems Father     No Known Problems Sister     No Known Problems Brother     reviewed    Meds:  Current Facility-Administered Medications   Medication Dose Route Frequency Provider Last Rate Last Admin    acetaminophen (TYLENOL) tablet 650 mg  650 mg Oral Q6H PRN Ayesha Lau PA-C   650 mg at 04/06/25 1412    aluminum-magnesium hydroxide-simethicone (MAALOX) oral suspension 30 mL  30 mL Oral Q6H PRN Ayesha Lau PA-C        hydrOXYzine HCL (ATARAX) tablet 25 mg  25 mg Oral Q6H PRN Swetha Baker MD   25 mg at 04/06/25 1412    melatonin tablet 6 mg  6 mg Oral HS PRN Ayesha Lau PA-C   " "     ondansetron (ZOFRAN) injection 4 mg  4 mg Intravenous Q4H PRN Ayesha Lau PA-C   4 mg at 04/05/25 0219    senna (SENOKOT) tablet 8.6 mg  1 tablet Oral HS PRN Ayesha Lau PA-C   8.6 mg at 04/06/25 0448    thiamine (VITAMIN B1) 200 mg in sodium chloride 0.9 % 50 mL IVPB  200 mg Intravenous TID Ayesha Lau PA-C 100 mL/hr at 04/07/25 0507 Rate Verify at 04/07/25 0507    Followed by    [START ON 4/12/2025] thiamine (VITAMIN B1) 250 mg in sodium chloride 0.9 % 50 mL IVPB  250 mg Intravenous Daily Ayesha Lau PA-C            Medications Prior to Admission:     docusate sodium (COLACE) 100 mg capsule    Ostomy Supplies (Premier Colostomy/Ileostomy) KIT    Labs:  Results from last 7 days   Lab Units 04/04/25  0145 04/02/25  1134   WBC Thousand/uL 7.80 8.29   HEMOGLOBIN g/dL 15.0 15.4   HEMATOCRIT % 45.3 45.7   PLATELETS Thousands/uL 314 333   SEGS PCT % 56 65   LYMPHO PCT % 32 25   MONO PCT % 6 6   EOS PCT % 4 2     Results from last 7 days   Lab Units 04/06/25  0443 04/05/25  0604 04/04/25  0145 04/02/25  1134   POTASSIUM mmol/L 4.4 4.5 4.5 3.9   CHLORIDE mmol/L 100 100 96 93*   CO2 mmol/L 28 28 29 30   BUN mg/dL 14 8 6 5   CREATININE mg/dL 0.41* 0.43* 0.45* 0.40*   CALCIUM mg/dL 8.9 8.7 9.7 9.7   ALK PHOS U/L  --   --  79 83   ALT U/L  --   --  12 12   AST U/L  --   --  18 15     No results found for: \"TROPONINI\", \"CKMB\", \"CKTOTAL\"  Results from last 7 days   Lab Units 04/04/25 0145   INR  0.97     Lab Results   Component Value Date    BLOODCX No Growth at 72 hrs. 04/04/2025    BLOODCX No Growth at 72 hrs. 04/04/2025         Imaging:  Results for orders placed during the hospital encounter of 04/04/25    XR chest portable    Narrative  XR CHEST PORTABLE    INDICATION: cough.    COMPARISON: 7/30/2020, CT 4/2/2025    FINDINGS:    Clear lungs. No pneumothorax or pleural effusion.    Normal cardiomediastinal silhouette.    Bones are unremarkable for age.    Normal upper " abdomen.    Impression  No acute cardiopulmonary disease.        Workstation performed: [id number]    Results for orders placed in visit on 07/30/20    XR chest pa & lateral    Narrative  CHEST    INDICATION:   F17.200: Nicotine dependence, unspecified, uncomplicated  R06.02: Shortness of breath.    COMPARISON:  3/8/2015    EXAM PERFORMED/VIEWS:  XR CHEST PA & LATERAL  The frontal view was performed utilizing dual energy radiographic technique.      FINDINGS:    Cardiomediastinal silhouette appears unremarkable.    Mid right lower lung 9 mm nodular density    The lungs are clear.  No pneumothorax or pleural effusion.    Osseous structures appear within normal limits for patient age.    Impression  No acute cardiopulmonary disease.    Mid right lower lung nodular density.  Recommend PA view with nipple markers.    The study was marked in EPIC for significant notification.      Workstation performed: [id number]      Last 24 Hours Medication List:   Current Facility-Administered Medications   Medication Dose Route Frequency Provider Last Rate    acetaminophen  650 mg Oral Q6H PRN Ayesha Lau PA-C      aluminum-magnesium hydroxide-simethicone  30 mL Oral Q6H PRN Ayesha Lau PA-C      hydrOXYzine HCL  25 mg Oral Q6H PRN Swetha Baker MD      melatonin  6 mg Oral HS PRN Ayesha Lau PA-C      ondansetron  4 mg Intravenous Q4H PRN Ayesha Lau PA-C      senna  1 tablet Oral HS PRN Ayesha Lau PA-C      thiamine  200 mg Intravenous TID Ayesha Lau PA-C 100 mL/hr at 04/07/25 0507    Followed by    [START ON 4/12/2025] thiamine  250 mg Intravenous Daily Ayesha Lau PA-C          Today, Patient Was Seen By: Jadon Kaplan MD    ** Please Note: Dictation voice to text software may have been used in the creation of this document. **

## 2025-04-07 NOTE — PLAN OF CARE
Problem: Potential for Falls  Goal: Patient will remain free of falls  Description: INTERVENTIONS:- Educate patient/family on patient safety including physical limitations- Instruct patient to call for assistance with activity - Consult OT/PT to assist with strengthening/mobility - Keep Call bell within reach- Keep bed low and locked with side rails adjusted as appropriate- Keep care items and personal belongings within reach- Initiate and maintain comfort rounds- Make Fall Risk Sign visible to staff- Offer Toileting every 2 Hours, in advance of need- Initiate/Maintain alarm- Obtain necessary fall risk management equipment- Apply yellow socks and bracelet for high fall risk patients- Consider moving patient to room near nurses station  Outcome: Progressing     Problem: Prexisting or High Potential for Compromised Skin Integrity  Goal: Skin integrity is maintained or improved  Description: INTERVENTIONS:- Identify patients at risk for skin breakdown- Assess and monitor skin integrity- Assess and monitor nutrition and hydration status- Monitor labs - Assess for incontinence - Turn and reposition patient- Assist with mobility/ambulation- Relieve pressure over bony prominences- Avoid friction and shearing- Provide appropriate hygiene as needed including keeping skin clean and dry- Evaluate need for skin moisturizer/barrier cream- Collaborate with interdisciplinary team - Patient/family teaching- Consider wound care consult   Outcome: Progressing     Problem: PAIN - ADULT  Goal: Verbalizes/displays adequate comfort level or baseline comfort level  Description: Interventions:- Encourage patient to monitor pain and request assistance- Assess pain using appropriate pain scale- Administer analgesics based on type and severity of pain and evaluate response- Implement non-pharmacological measures as appropriate and evaluate response- Consider cultural and social influences on pain and pain management- Notify  physician/advanced practitioner if interventions unsuccessful or patient reports new pain  Outcome: Progressing     Problem: DISCHARGE PLANNING  Goal: Discharge to home or other facility with appropriate resources  Description: INTERVENTIONS:- Identify barriers to discharge w/patient and caregiver- Arrange for needed discharge resources and transportation as appropriate- Identify discharge learning needs (meds, wound care, etc.)- Arrange for interpretive services to assist at discharge as needed- Refer to Case Management Department for coordinating discharge planning if the patient needs post-hospital services based on physician/advanced practitioner order or complex needs related to functional status, cognitive ability, or social support system  Outcome: Progressing     Problem: Knowledge Deficit  Goal: Patient/family/caregiver demonstrates understanding of disease process, treatment plan, medications, and discharge instructions  Description: Complete learning assessment and assess knowledge base.Interventions:- Provide teaching at level of understanding- Provide teaching via preferred learning methods  Outcome: Progressing     Problem: NEUROSENSORY - ADULT  Goal: Achieves maximal functionality and self care  Description: INTERVENTIONS- Monitor swallowing and airway patency with patient fatigue and changes in neurological status- Encourage and assist patient to increase activity and self care. - Encourage visually impaired, hearing impaired and aphasic patients to use assistive/communication devices  Outcome: Progressing     Problem: Nutrition/Hydration-ADULT  Goal: Nutrient/Hydration intake appropriate for improving, restoring or maintaining nutritional needs  Description: Monitor and assess patient's nutrition/hydration status for malnutrition. Collaborate with interdisciplinary team and initiate plan and interventions as ordered.  Monitor patient's weight and dietary intake as ordered or per policy. Utilize  nutrition screening tool and intervene as necessary. Determine patient's food preferences and provide high-protein, high-caloric foods as appropriate. INTERVENTIONS:- Monitor oral intake, urinary output, labs, and treatment plans- Assess nutrition and hydration status and recommend course of action- Evaluate amount of meals eaten- Assist patient with eating if necessary - Allow adequate time for meals- Recommend/ encourage appropriate diets, oral nutritional supplements, and vitamin/mineral supplements- Order, calculate, and assess calorie counts as needed- Recommend, monitor, and adjust tube feedings and TPN/PPN based on assessed needs- Assess need for intravenous fluids- Provide specific nutrition/hydration education as appropriate- Include patient/family/caregiver in decisions related to nutrition  Monitor and assess patient's nutrition/hydration status for malnutrition. Collaborate with interdisciplinary team and initiate plan and interventions as ordered.  Monitor patient's weight and dietary intake as ordered or per policy. Utilize nutrition screening tool and intervene as necessary. Determine patient's food preferences and provide high-protein, high-caloric foods as appropriate. INTERVENTIONS:- Monitor oral intake, urinary output, labs, and treatment plans- Assess nutrition and hydration status and recommend course of action- Evaluate amount of meals eaten- Assist patient with eating if necessary - Allow adequate time for meals- Recommend/ encourage appropriate diets, oral nutritional supplements, and vitamin/mineral supplements- Order, calculate, and assess calorie counts as needed- Recommend, monitor, and adjust tube feedings and TPN/PPN based on assessed needs- Assess need for intravenous fluids- Provide specific nutrition/hydration education as appropriate- Include patient/family/caregiver in decisions related to nutrition  Outcome: Progressing

## 2025-04-08 VITALS
DIASTOLIC BLOOD PRESSURE: 61 MMHG | HEART RATE: 72 BPM | HEIGHT: 66 IN | TEMPERATURE: 97.4 F | RESPIRATION RATE: 18 BRPM | OXYGEN SATURATION: 95 % | WEIGHT: 100.97 LBS | BODY MASS INDEX: 16.23 KG/M2 | SYSTOLIC BLOOD PRESSURE: 102 MMHG

## 2025-04-08 LAB
ANION GAP SERPL CALCULATED.3IONS-SCNC: 9 MMOL/L (ref 4–13)
BASOPHILS # BLD AUTO: 0.07 THOUSANDS/ÂΜL (ref 0–0.1)
BASOPHILS NFR BLD AUTO: 1 % (ref 0–1)
BUN SERPL-MCNC: 11 MG/DL (ref 5–25)
CALCIUM SERPL-MCNC: 9.3 MG/DL (ref 8.4–10.2)
CHLORIDE SERPL-SCNC: 95 MMOL/L (ref 96–108)
CO2 SERPL-SCNC: 28 MMOL/L (ref 21–32)
CREAT SERPL-MCNC: 0.44 MG/DL (ref 0.6–1.3)
EOSINOPHIL # BLD AUTO: 0.48 THOUSAND/ÂΜL (ref 0–0.61)
EOSINOPHIL NFR BLD AUTO: 7 % (ref 0–6)
ERYTHROCYTE [DISTWIDTH] IN BLOOD BY AUTOMATED COUNT: 13 % (ref 11.6–15.1)
GFR SERPL CREATININE-BSD FRML MDRD: 110 ML/MIN/1.73SQ M
GLUCOSE SERPL-MCNC: 157 MG/DL (ref 65–140)
HCT VFR BLD AUTO: 42.7 % (ref 34.8–46.1)
HGB BLD-MCNC: 14.4 G/DL (ref 11.5–15.4)
IMM GRANULOCYTES # BLD AUTO: 0.04 THOUSAND/UL (ref 0–0.2)
IMM GRANULOCYTES NFR BLD AUTO: 1 % (ref 0–2)
LYMPHOCYTES # BLD AUTO: 1.82 THOUSANDS/ÂΜL (ref 0.6–4.47)
LYMPHOCYTES NFR BLD AUTO: 25 % (ref 14–44)
MCH RBC QN AUTO: 31.8 PG (ref 26.8–34.3)
MCHC RBC AUTO-ENTMCNC: 33.7 G/DL (ref 31.4–37.4)
MCV RBC AUTO: 94 FL (ref 82–98)
MONOCYTES # BLD AUTO: 0.42 THOUSAND/ÂΜL (ref 0.17–1.22)
MONOCYTES NFR BLD AUTO: 6 % (ref 4–12)
NEUTROPHILS # BLD AUTO: 4.4 THOUSANDS/ÂΜL (ref 1.85–7.62)
NEUTS SEG NFR BLD AUTO: 60 % (ref 43–75)
NRBC BLD AUTO-RTO: 0 /100 WBCS
PLATELET # BLD AUTO: 290 THOUSANDS/UL (ref 149–390)
PMV BLD AUTO: 9.5 FL (ref 8.9–12.7)
POTASSIUM SERPL-SCNC: 4.1 MMOL/L (ref 3.5–5.3)
RBC # BLD AUTO: 4.53 MILLION/UL (ref 3.81–5.12)
SODIUM SERPL-SCNC: 132 MMOL/L (ref 135–147)
THYROGLOB AB SERPL-ACNC: 113.2 IU/ML (ref 0–0.9)
THYROPEROXIDASE AB SERPL-ACNC: 316 IU/ML (ref 0–34)
WBC # BLD AUTO: 7.23 THOUSAND/UL (ref 4.31–10.16)

## 2025-04-08 PROCEDURE — 80048 BASIC METABOLIC PNL TOTAL CA: CPT | Performed by: INTERNAL MEDICINE

## 2025-04-08 PROCEDURE — 85025 COMPLETE CBC W/AUTO DIFF WBC: CPT | Performed by: INTERNAL MEDICINE

## 2025-04-08 PROCEDURE — 99239 HOSP IP/OBS DSCHRG MGMT >30: CPT | Performed by: INTERNAL MEDICINE

## 2025-04-08 NOTE — ASSESSMENT & PLAN NOTE
Found to have urinary retention during recent ED visit on 4/2  Continue Dietrich  Patient had successful voiding trial

## 2025-04-08 NOTE — PROGRESS NOTES
Patient is refusing morning lab work and IV site change. This RN educated patient on importance of obtaining lab work and changing IV site. Patient continuing to refuse. Ines Ashley PA-C notified via epic secure.

## 2025-04-08 NOTE — ASSESSMENT & PLAN NOTE
Presenting to the ED with increasing confusion and erratic behavior per family  On review of ED documentation from 4/2 appeared to have intermittent confusion and abnormal responses then as well  History of alcoholism in the past  Patient does have significant confabulations at time of admission, otherwise is oriented to self, place, month, and corrected herself on the year  Workup relatively unrevealing in the ED including labs and CTh  Concern for Wernicke encephalopathy-continue high-dose thiamine  Daughter had expressed concern about ability for the patient to stay at home-CM consulted for possible placement  anti-microsomal antibody, 377-> thyroid Ab panel anti thyroglobulin ab t3 and t4 pending  serum autoimmune encephalopathy profile,   vitamin B12 WNL  vitamin B1-> will skewed d/t pt receiving high dose thiamine   vitamin B6   MRI showing no acute pathology  Discussed with neuro unlikely to be infectious in nature also seems that the pt was self medicating with alcohol, seems like it may be more behavioral  Psychiatry consult patient  Psychiatry recommended no inpatient BHU needs and recommended neuropsych evaluation  Neuro psych determined patient has capacity to make decision  Patient will be discharged with outpatient follow-up with PCP and continue thiamine

## 2025-04-08 NOTE — DISCHARGE SUMMARY
Discharge Summary - Hospitalist   Name: Berta Smart 59 y.o. female I MRN: [id number]  Unit/Bed#: -01 I Date of Admission: 4/4/2025   Date of Service: 4/8/2025 I Hospital Day: 4     Assessment & Plan  Encephalopathy  Presenting to the ED with increasing confusion and erratic behavior per family  On review of ED documentation from 4/2 appeared to have intermittent confusion and abnormal responses then as well  History of alcoholism in the past  Patient does have significant confabulations at time of admission, otherwise is oriented to self, place, month, and corrected herself on the year  Workup relatively unrevealing in the ED including labs and CTh  Concern for Wernicke encephalopathy-continue high-dose thiamine  Daughter had expressed concern about ability for the patient to stay at home-CM consulted for possible placement  anti-microsomal antibody, 377-> thyroid Ab panel anti thyroglobulin ab t3 and t4 pending  serum autoimmune encephalopathy profile,   vitamin B12 WNL  vitamin B1-> will skewed d/t pt receiving high dose thiamine   vitamin B6   MRI showing no acute pathology  Discussed with neuro unlikely to be infectious in nature also seems that the pt was self medicating with alcohol, seems like it may be more behavioral  Psychiatry consult patient  Psychiatry recommended no inpatient BHU needs and recommended neuropsych evaluation  Neuro psych determined patient has capacity to make decision  Patient will be discharged with outpatient follow-up with PCP and continue thiamine  Urinary retention  Found to have urinary retention during recent ED visit on 4/2  Continue Dietrich  Patient had successful voiding trial  Alcoholism (HCC)  History of alcoholism in the past - approximately 7 beers daily   Reports that she last drink alcohol a few months ago - daughter also reports last drink was 2 months ago   No signs of alcohol withdrawal on admission  Concern for Warnicke encephalopathy as above  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 Treated with liberal PO diet + oral nutrition supplements    BMI Findings:  Adult BMI Classifications: Underweight < 18.5        Body mass index is 16.3 kg/m².     Adjustment disorder with mixed disturbance of emotions and conduct     Hospital Course:     Berta Smart is a 59 y.o. female patient who originally presented to the hospital on   Admission Orders (From admission, onward)       Ordered        04/04/25 0414  INPATIENT ADMISSION  Once                         due to encephalopathy.  Patient was admitted and was seen by neurology and underwent MRI brain.  Patient was treated with high-dose IV thiamine.  Patient was seen by psychiatry and neuropsych.  Patient was determined to have capacity.  Patient will be discharged home with outpatient follow-up with PCP and neurology  On Exam-  Chest-bilateral air entry, clear to auscultation  Abdomen-soft, nontender  Heart-S1 S2 regular  Extremities-no pedal edema or calf tenderness  Neuro-alert awake oriented x3.  No focal deficits    Please see above list of diagnoses and related plan for additional information.   Follow up with PCP as outpatient  Follow-up with neurology as outpatient  Take OTC thiamine daily    CONSULTING PROVIDERS   IP CONSULT TO NEUROLOGY  IP CONSULT TO CASE MANAGEMENT  IP CONSULT TO PSYCHIATRY  IP CONSULT TO NEUROPSYCHOLOGY    PROCEDURES PERFORMED  * No surgery found *    RADIOLOGY RESULTS  MRI brain w wo contrast  Result Date:  4/5/2025  Narrative: MRI BRAIN WITH AND WITHOUT CONTRAST INDICATION: Altered mental status (increasing confusion and erratic behavior). Evaluate for stroke. COMPARISON: CT head without contrast 4/4/2025 and brain MRI without contrast 7/6/2010 TECHNIQUE: Multiplanar, multisequence imaging of the brain was performed before and after gadolinium administration. Imaging performed on 1.5T MRI IV Contrast:  5 mL of Gadobutrol injection IMAGE QUALITY:   Diagnostic. FINDINGS: BRAIN PARENCHYMA:  There is no discrete mass, mass effect or midline shift. There is no intracranial hemorrhage.  Normal posterior fossa.  Diffusion imaging is unremarkable. Small scattered hyperintensities on T2/FLAIR imaging are noted in the periventricular and subcortical white matter demonstrating an appearance that is statistically most likely to represent mild microangiopathic change. Postcontrast imaging of the brain demonstrates no abnormal enhancement. VENTRICLES:  Normal for the patient's age. SELLA AND PITUITARY GLAND:  Normal. ORBITS:  Normal. PARANASAL SINUSES: Small right maxillary sinus mucus retention cyst, similar to prior. VASCULATURE:  Evaluation of the major intracranial vasculature demonstrates appropriate flow voids. CALVARIUM AND SKULL BASE: Normal calvarium. Small left mastoid effusion. EXTRACRANIAL SOFT TISSUES:  Normal.     Impression: 1. No acute infarction, intracranial hemorrhage or mass. 2. Small left mastoid effusion. Resident: ANA NAIR I, the attending radiologist, have reviewed the images and agree with the final report above. Workstation performed: [id number]     XR chest portable  Result Date: 4/4/2025  Narrative: XR CHEST PORTABLE INDICATION: cough. COMPARISON: 7/30/2020, CT 4/2/2025 FINDINGS: Clear lungs. No pneumothorax or pleural effusion. Normal cardiomediastinal silhouette. Bones are unremarkable for age. Normal upper abdomen.     Impression: No acute cardiopulmonary disease. Workstation performed:  [id number]     CT head without contrast  Result Date: 4/4/2025  Narrative: CT BRAIN - WITHOUT CONTRAST INDICATION:   ams. COMPARISON: 12/8/2010. TECHNIQUE:  CT examination of the brain was performed.  Multiplanar 2D reformatted images were created from the source data. Radiation dose length product (DLP) for this visit:  880.59 mGy-cm .  This examination, like all CT scans performed in the [facility], was performed utilizing techniques to minimize radiation dose exposure, including the use of iterative  reconstruction and automated exposure control. IMAGE QUALITY:  Diagnostic. FINDINGS: PARENCHYMA: Decreased attenuation is noted in periventricular and subcortical white matter demonstrating an appearance that is statistically most likely to represent mild microangiopathic change. No CT signs of acute infarction.  No intracranial mass, mass effect or midline shift.  No acute parenchymal hemorrhage. VENTRICLES AND EXTRA-AXIAL SPACES:  Normal for the patient's age. VISUALIZED ORBITS: Normal visualized orbits. PARANASAL SINUSES: Tiny retention cyst in the right maxillary sinus. CALVARIUM AND EXTRACRANIAL SOFT TISSUES: Normal.     Impression: No acute intracranial abnormality. Workstation performed: [id number]     CT Abdomen pelvis with contrast  Result Date: 4/2/2025  Narrative: CT ABDOMEN AND PELVIS WITH IV CONTRAST INDICATION: blocked ostomy. . COMPARISON: None. TECHNIQUE: CT examination of the abdomen and pelvis was performed. Multiplanar 2D reformatted images were created from the source data. This examination, like all CT scans performed in the [facility], was performed utilizing techniques to minimize radiation dose exposure, including the use of iterative reconstruction and automated exposure control. Radiation dose length product (DLP) for this visit: 406.31 mGy-cm IV Contrast: 50 mL of iohexol 100 mL of iohexol Enteric Contrast: Not administered. FINDINGS: ABDOMEN LOWER CHEST: No  clinically significant abnormality in the visualized lower chest. LIVER/BILIARY TREE: Subcentimeter hypoattenuating lesion(s), too small to characterize but statistically likely benign, which do not require follow-up (ACR White Paper 2017). No suspicious mass. Normal hepatic contours. No biliary dilation. GALLBLADDER: No calcified gallstones. No pericholecystic inflammatory change. SPLEEN: Unremarkable. PANCREAS: Unremarkable. ADRENAL GLANDS: Unremarkable. KIDNEYS/URETERS: Unremarkable. No hydronephrosis. STOMACH AND BOWEL: Left anterior colostomy. There is mass effect upon the colon leading into the ostomy due to a combination of a markedly distended bladder and moderate gaseous dilation of adjacent small bowel (2:74) Moderate colonic stool burden. Fluid and gaseous dilation of a loop of small bowel in the right anterior mid abdomen measures up to 4.3 cm in diameter (2:65). Likely related to mass effect upon the distal aspect of this loop between the bladder and right psoas muscle (2:90). No evidence of obstruction. APPENDIX: No findings to suggest appendicitis. ABDOMINOPELVIC CAVITY: No ascites. No pneumoperitoneum. No lymphadenopathy. VESSELS: Atherosclerosis without abdominal aortic aneurysm. PELVIS REPRODUCTIVE ORGANS: Unremarkable for patient's age. URINARY BLADDER: Markedly distended, measuring up to 18 cm in craniocaudal dimension. ABDOMINAL WALL/INGUINAL REGIONS: Unremarkable. BONES: No acute fracture or suspicious osseous lesion.     Impression: Marked bladder distention consistent with urinary retention. Urology consult recommended. Mass effect from the bladder upon small bowel results in segments with moderate dilation measuring up to 4 cm. Mass effect from the bladder and dilated small bowel upon the colon leading into a left anterior ostomy likely accounts for decreased output. Moderate colonic stool burden. No evidence of intrinsic bowel obstruction. The study was marked in EPIC for immediate  notification. Workstation performed: [id number]       LABS  Results from last 7 days   Lab Units 04/08/25  0830 04/04/25  0145 04/02/25  1134   WBC Thousand/uL 7.23 7.80 8.29   HEMOGLOBIN g/dL 14.4 15.0 15.4   HEMATOCRIT % 42.7 45.3 45.7   MCV fL 94 94 93   PLATELETS Thousands/uL 290 314 333   INR   --  0.97  --      Results from last 7 days   Lab Units 04/08/25  0830 04/06/25  0443 04/05/25  0604 04/04/25  0145 04/02/25  1134   SODIUM mmol/L 132* 133* 133* 134* 130*   POTASSIUM mmol/L 4.1 4.4 4.5 4.5 3.9   CHLORIDE mmol/L 95* 100 100 96 93*   CO2 mmol/L 28 28 28 29 30   BUN mg/dL 11 14 8 6 5   CREATININE mg/dL 0.44* 0.41* 0.43* 0.45* 0.40*   CALCIUM mg/dL 9.3 8.9 8.7 9.7 9.7   ALBUMIN g/dL  --   --   --  4.3 4.4   TOTAL BILIRUBIN mg/dL  --   --   --  0.88 0.63   ALK PHOS U/L  --   --   --  79 83   ALT U/L  --   --   --  12 12   AST U/L  --   --   --  18 15   EGFR ml/min/1.73sq m 110 112 110 109 113   GLUCOSE RANDOM mg/dL 157* 87 98 98 113     Results from last 7 days   Lab Units 04/04/25  0145   HS TNI 0HR ng/L <2                      Results from last 7 days   Lab Units 04/02/25  1134   TSH 3RD GENERATON uIU/mL 4.039     Results from last 7 days   Lab Units 04/04/25  0145   LACTIC ACID mmol/L 0.6   PROCALCITONIN ng/ml <0.05           Cultures:   Results from last 7 days   Lab Units 04/04/25  0154   COLOR UA  Yellow   CLARITY UA  Clear   SPEC GRAV UA  1.020   PH UA  6.5   LEUKOCYTES UA  Moderate*   NITRITE UA  Negative   GLUCOSE UA mg/dl Negative   KETONES UA mg/dl 10 (1+)*   BILIRUBIN UA  Negative   BLOOD UA  Trace*      Results from last 7 days   Lab Units 04/04/25  0154   RBC UA /hpf 1-2   WBC UA /hpf 4-10*   EPITHELIAL CELLS WET PREP /hpf Occasional   BACTERIA UA /hpf Occasional      Results from last 7 days   Lab Units 04/04/25  0145   BLOOD CULTURE  No Growth After 4 Days.  No Growth After 4 Days.       Condition at Discharge:  good      Discharge instructions/Information to patient and family:   See after  visit summary for information provided to patient and family.      Provisions for Follow-Up Care:  See after visit summary for information related to follow-up care and any pertinent home health orders.      Disposition:     Home       Discharge Statement:  I spent 40 minutes discharging the patient. This time was spent on the day of discharge. I had direct contact with the patient on the day of discharge. Greater than 50% of the total time was spent examining patient, answering all patient questions, arranging and discussing plan of care with patient as well as directly providing post-discharge instructions.  Additional time then spent on discharge activities.    Discharge Medications:  See after visit summary for reconciled discharge medications provided to patient and family.      ** Please Note: This note has been constructed using a voice recognition system **

## 2025-04-09 LAB
BACTERIA BLD CULT: NORMAL
BACTERIA BLD CULT: NORMAL

## 2025-04-11 ENCOUNTER — OFFICE VISIT (OUTPATIENT)
Dept: FAMILY MEDICINE CLINIC | Facility: CLINIC | Age: 60
End: 2025-04-11

## 2025-04-11 VITALS
OXYGEN SATURATION: 97 % | RESPIRATION RATE: 17 BRPM | SYSTOLIC BLOOD PRESSURE: 110 MMHG | HEART RATE: 92 BPM | WEIGHT: 89.4 LBS | HEIGHT: 66 IN | DIASTOLIC BLOOD PRESSURE: 74 MMHG | BODY MASS INDEX: 14.37 KG/M2 | TEMPERATURE: 97.6 F

## 2025-04-11 DIAGNOSIS — R33.9 URINARY RETENTION: ICD-10-CM

## 2025-04-11 DIAGNOSIS — R79.89 ABNORMAL THYROID BLOOD TEST: ICD-10-CM

## 2025-04-11 DIAGNOSIS — Z12.31 ENCOUNTER FOR SCREENING MAMMOGRAM FOR BREAST CANCER: ICD-10-CM

## 2025-04-11 DIAGNOSIS — Z12.4 CERVICAL CANCER SCREENING: ICD-10-CM

## 2025-04-11 DIAGNOSIS — Z12.2 SCREENING FOR LUNG CANCER: ICD-10-CM

## 2025-04-11 DIAGNOSIS — F17.200 TOBACCO USE DISORDER: ICD-10-CM

## 2025-04-11 DIAGNOSIS — G93.40 ENCEPHALOPATHY, UNSPECIFIED TYPE: Primary | ICD-10-CM

## 2025-04-11 DIAGNOSIS — E87.1 HYPONATREMIA: ICD-10-CM

## 2025-04-11 DIAGNOSIS — Z59.9 FINANCIAL DIFFICULTIES: ICD-10-CM

## 2025-04-11 DIAGNOSIS — Z93.3 COLOSTOMY STATUS (HCC): ICD-10-CM

## 2025-04-11 DIAGNOSIS — F32.1 MODERATE MAJOR DEPRESSION, SINGLE EPISODE (HCC): ICD-10-CM

## 2025-04-11 PROCEDURE — 99205 OFFICE O/P NEW HI 60 MIN: CPT | Performed by: STUDENT IN AN ORGANIZED HEALTH CARE EDUCATION/TRAINING PROGRAM

## 2025-04-11 RX ORDER — B-COMPLEX WITH VITAMIN C
250 TABLET ORAL DAILY
COMMUNITY

## 2025-04-11 RX ORDER — PAROXETINE 10 MG/1
10 TABLET, FILM COATED ORAL DAILY
Qty: 90 TABLET | Refills: 3 | Status: SHIPPED | OUTPATIENT
Start: 2025-04-11

## 2025-04-11 SDOH — ECONOMIC STABILITY - INCOME SECURITY: PROBLEM RELATED TO HOUSING AND ECONOMIC CIRCUMSTANCES, UNSPECIFIED: Z59.9

## 2025-04-11 NOTE — PATIENT INSTRUCTIONS
"Patient Education     Depression in adults   The Basics   Written by the doctors and editors at [facility]   What is depression? -- Depression is a disorder that makes you sad, but it is different from normal sadness. Depression can make it hard for you to work, study, or do everyday tasks.  What causes depression? -- Depression is caused by problems with chemicals in the brain called \"neurotransmitters.\" Some people might be more likely to have depression if it runs in their family. Other things might also play a role, including hormones, certain health problems, medicines, stress, being mistreated as a child, family problems, and problems with friends or at school or work.  How do I know if I am depressed? -- People with depression feel down most of the time for at least 2 weeks. They also have at least 1 of these 2 symptoms:   They no longer enjoy or care about doing the things that they used to like to do.   They feel sad, down, hopeless, or cranky most of the day, almost every day.  People with depression can also have other symptoms. Examples include:   Changes in your appetite or weight. You might eat too little or too much, or gain or lose weight without trying.   Sleeping too much or too little   Feeling tired or like you have no energy   Feeling guilty, helpless, or like you are worth nothing   Trouble with concentration or memory   Acting restless or have trouble staying still, or moving or speaking more slowly than normal   Repeated thoughts of death or killing yourself  If you think that you might be depressed, see your doctor or nurse. Only someone trained in mental health can tell for sure if you are depressed.  How is depression diagnosed? -- Your doctor or nurse will do a physical exam, ask you questions, and might order tests. Depression can have a big impact on your life. Luckily, depression can be treated, and the sooner treatment is started, the better it works.  Get help right away if you are " "thinking of hurting or killing yourself! -- If you ever feel like you might hurt yourself or someone else, help is available:   In the [place], contact the 686 Suicide & Crisis Lifeline:   To speak to someone, call or text 903.   To talk to someone online, go to www.[facility].org/chat.   Call your doctor or nurse, and tell them it is urgent.   Call for an ambulance (in the [place] and [place], call 9-1-1).   Go to the emergency department at the nearest hospital.  What are the treatments for depression? -- Your doctor or nurse will work with you to make a treatment plan. Treatment can include:   Helping you learn more about depression   Counseling (with a psychiatrist, psychologist, nurse, or )   Medicines that relieve depression   Creating a plan to limit access to items that you might use to harm yourself   Other treatments that pass magnetic waves or electricity into the brain  In addition to treatment, getting regular physical activity can also help you feel better.  People with depression that is not too severe can get better by taking medicines or talking with a counselor. People with severe depression usually need medicines to get better, and might also need to see a counselor.  Another treatment involves placing a device against the scalp to pass magnetic waves into the brain. This is called \"transcranial magnetic stimulation\" (\"TMS\"). Doctors might suggest TMS if medicines and counseling have not helped.  Some people with severe depression might need a treatment called \"electroconvulsive therapy\" (\"ECT\"). During ECT, doctors pass an electric current through a person's brain in a safe way.  When will I feel better? -- Most treatment options take a little while to start working.   Many people who take medicines start to feel better within 2 weeks, but it might be 4 to 8 weeks before the medicine has its full effect.   Many people who see a counselor start to feel better within a few weeks, but it might " take 8 to 10 weeks to get the greatest benefit.  If the first treatment you try does not help you, tell your doctor or nurse, but do not give up. Some people need to try different treatments or combinations of treatments before they find an approach that works. Your doctor, nurse, or counselor can work with you to find the treatment that is right for you. They can also help you figure out how to cope while you search for the right treatment or are waiting for your treatment to start working.  How do I decide which treatment to have? -- You and your doctor or nurse will need to work together to choose a treatment for you. Medicines might work a little faster than counseling. But medicines can also cause side effects. Plus, some people do not like the idea of taking medicine.  Seeing a counselor involves talking about your feelings. That is also hard for some people.  What if I take medicine for depression and I want to have a baby? -- Some depression medicines can cause problems for an unborn baby. But having untreated depression during pregnancy can also cause problems. If you want to get pregnant, tell your doctor but do not stop taking your medicines. Together, you can plan the safest way for you to have your baby.  It's also important to talk with your doctor if you want to breastfeed after your baby is born. Breastfeeding has lots of benefits for both mother and baby. Some depression medicines are safer than others to use while breastfeeding. But having untreated depression after giving birth can also cause problems, so do not stop taking your medicines. Your doctor can work with you to plan the safest way for you to feed your baby.  All topics are updated as new evidence becomes available and our peer review process is complete.  This topic retrieved from [employer] on: Mar 06, 2024.  Topic 23201 Version 22.0  Release: 32.2.4 - C32.64  © 2024 UpToDate, Inc. and/or its affiliates. All rights reserved.  figure 1:  "Mood disorders caused by problems in the brain     Mooddisorders, such as depression and bipolar disorder, are caused by problems with\"neurotransmitters.\" These are chemicals in the brain that can affect your emotions.Treatments for mood disorders seem to work by changing the levels of certainneurotransmitters.  Graphic 71161 Version 4.0  Consumer Information Use and Disclaimer   Disclaimer: This generalized information is a limited summary of diagnosis, treatment, and/or medication information. It is not meant to be comprehensive and should be used as a tool to help the user understand and/or assess potential diagnostic and treatment options. It does NOT include all information about conditions, treatments, medications, side effects, or risks that may apply to a specific patient. It is not intended to be medical advice or a substitute for the medical advice, diagnosis, or treatment of a health care provider based on the health care provider's examination and assessment of a patient's specific and unique circumstances. Patients must speak with a health care provider for complete information about their health, medical questions, and treatment options, including any risks or benefits regarding use of medications. This information does not endorse any treatments or medications as safe, effective, or approved for treating a specific patient. UpToDate, Inc. and its affiliates disclaim any warranty or liability relating to this information or the use thereof.The use of this information is governed by the Terms of Use, available at https://www.[employer].com/en/know/clinical-effectiveness-terms. 2024© UpToDate, Inc. and its affiliates and/or licensors. All rights reserved.  Copyright   © 2024 UpToDate, Inc. and/or its affiliates. All rights reserved.    "

## 2025-04-11 NOTE — ASSESSMENT & PLAN NOTE
Discussed concerns for her current weight; recommend we complete age appropriate cancer screening

## 2025-04-11 NOTE — ASSESSMENT & PLAN NOTE
Pt with concerns for complications for her colon resection and colostomy; recommend GI and colorectal surgery follow up. Pt declined

## 2025-04-11 NOTE — PROGRESS NOTES
Name: Berta Smart      : 1965      MRN: [id number]  Encounter Provider: Isabella Turner MD  Encounter Date: 2025   Encounter department: [facility]  :  Assessment & Plan  Encephalopathy, unspecified type  Recent episode of AMS and erratic behavior   Inpt work up with noted: normal B12, normal TSH/T4/T3, blood cultures negative, CBC, UA w/o infectious markers, negative UDS, normal procal, normal ammonia. MRI wnl. Improved with tx of wernickes with high dose thiamine. Pt noted as back to baseline mental status     C/w thiamine supplement and c/w holding ETOH use        Colostomy status (HCC)  Pt with concerns for complications for her colon resection and colostomy; recommend GI and colorectal surgery follow up. Pt declined        Tobacco use disorder  LDCT due; discussed taper and rx options, pt declined rx at this time but notes she will taper amt          BMI less than 19,adult  Discussed concerns for her current weight; recommend we complete age appropriate cancer screening        Moderate major depression, single episode (HCC)  Depression Screening Follow-up Plan: Patient's depression screening was positive with a PHQ-9 score of 10. Patient assessed for underlying major depression. They have no active suicidal ideations. Brief counseling provided and recommend additional follow-up/re-evaluation next office visit.  Pt agreed to trial of tx; agreed to paxil. AE discussed and she v/u  RTC in 4-6 weeks for reassessment   Orders:    PARoxetine (PAXIL) 10 mg tablet; Take 1 tablet (10 mg total) by mouth daily    Hyponatremia  Hyponatremia; pending additional lab work up   Orders:    Basic metabolic panel; Future    Osmolality-If this is regarding a toxic alcohol, STOP. Test is not routinely indicated. Please consult medical  on call for further guidance.; Future    Osmolality, urine; Future    Abnormal thyroid blood test  Discussed anti microsomal ab and anti  thyroglobulin ab elevated; TSH/T3/T4 normal though- discussed close monitoring of TSH every 6 months        Encounter for screening mammogram for breast cancer    Orders:    Mammo screening bilateral w 3d and cad; Future    Cervical cancer screening    Orders:    Ambulatory Referral to Obstetrics / Gynecology; Future    Screening for lung cancer  I discussed with her that she is a candidate for lung cancer CT screening.     The following Shared Decision-Making points were covered:  Benefits of screening were discussed, including the rates of reduction in death from lung cancer and other causes.  Harms of screening were reviewed, including false positive tests, radiation exposure levels, risks of invasive procedures, risks of complications of screening, and risk of overdiagnosis.  I counseled on the importance of adherence to annual lung cancer LDCT screening, impact of co-morbidities, and ability or willingness to undergo diagnosis and treatment.  I counseled on the importance of maintaining abstinence as a former smoker or was counseled on the importance of smoking cessation if a current smoker    Review of Eligibility Criteria: She meets all of the criteria for Lung Cancer Screening.   She is 59 y.o.   She has 20 pack year tobacco history and is a current smoker or has quit within the past 15 years  She presents no signs or symptoms of lung cancer    After discussion, the patient decided to elect lung cancer screening.    Orders:    CT lung screening program; Future    Urinary retention  Long standing markos of urinary retention. Recommend we complete urodynamic testing with Urology   Orders:    Ambulatory Referral to Urology; Future    Financial difficulties    Orders:    Ambulatory Referral to Social Work Care Management Program; Future           History of Present Illness   Pt previously followed in clinic in 2020; has not been seen in clinic since that time. Recent admission 04APril to 08APril at [facility]  "campus    58 yo F w/ markos of colon resection with colostomy in place, prior EtOH use presenting as new pt. Recent admission for AMS- family noted increasing confusion and erratic behavior. Inpt team had concerns for Wernicke and was started on high dose thiamine.    Pt with multiple concerns today. Family member present and [place] paperwork completed at time of apt for family - handed back to family member       Review of Systems   Constitutional:  Positive for unexpected weight change. Negative for chills and fever.   HENT:  Negative for trouble swallowing.    Eyes:  Negative for visual disturbance.   Respiratory:  Negative for cough, wheezing and stridor.    Cardiovascular:  Negative for chest pain, palpitations and leg swelling.   Gastrointestinal:  Positive for abdominal pain.   Genitourinary:  Positive for difficulty urinating. Negative for dysuria and hematuria.   Musculoskeletal:  Negative for gait problem.   Skin:  Negative for color change and rash.   Neurological:  Negative for light-headedness.   All other systems reviewed and are negative.      Objective   /74 (BP Location: Left arm, Patient Position: Sitting, Cuff Size: Child)   Pulse 92   Temp 97.6 °F (36.4 °C) (Tympanic)   Resp 17   Ht 5' 6\" (1.676 m)   Wt 40.6 kg (89 lb 6.4 oz)   SpO2 97%   BMI 14.43 kg/m²      Physical Exam  Vitals and nursing note reviewed.   Constitutional:       General: She is not in acute distress.     Appearance: She is well-developed. She is not toxic-appearing.      Comments: Frail appearing woman who appears older than stated age    HENT:      Head: Normocephalic and atraumatic.      Mouth/Throat:      Mouth: Mucous membranes are moist.      Pharynx: Oropharynx is clear.   Eyes:      Conjunctiva/sclera: Conjunctivae normal.   Cardiovascular:      Rate and Rhythm: Normal rate and regular rhythm.      Pulses: Normal pulses.      Heart sounds: Normal heart sounds. No murmur heard.  Pulmonary:      Effort: Pulmonary " effort is normal. No respiratory distress.      Breath sounds: Normal breath sounds. No wheezing or rales.   Abdominal:      General: There is no distension.      Palpations: Abdomen is soft.      Tenderness: There is no abdominal tenderness.   Musculoskeletal:         General: No swelling.      Cervical back: Normal range of motion and neck supple.      Right lower leg: No edema.      Left lower leg: No edema.   Skin:     General: Skin is warm and dry.      Capillary Refill: Capillary refill takes less than 2 seconds.   Neurological:      Mental Status: She is alert.      Sensory: No sensory deficit.      Motor: No weakness.      Coordination: Coordination normal.   Psychiatric:         Mood and Affect: Mood normal.         Behavior: Behavior normal.         Thought Content: Thought content normal.         Judgment: Judgment normal.       Administrative Statements   I have spent a total time of 60 minutes in caring for this patient on the day of the visit/encounter including Prognosis, Risks and benefits of tx options, Instructions for management, Patient and family education, Importance of tx compliance, Risk factor reductions, Impressions, Counseling / Coordination of care, Documenting in the medical record, Reviewing/placing orders in the medical record (including tests, medications, and/or procedures), and Obtaining or reviewing history  .  Depression Screening Follow-up Plan: Patient's depression screening was positive with a PHQ-9 score of 10. Patient assessed for underlying major depression. They have no active suicidal ideations. Brief counseling provided and recommend additional follow-up/re-evaluation next office visit.

## 2025-04-11 NOTE — ASSESSMENT & PLAN NOTE
LDCT due; discussed taper and rx options, pt declined rx at this time but notes she will taper amt

## 2025-04-11 NOTE — ASSESSMENT & PLAN NOTE
Long standing markos of urinary retention. Recommend we complete urodynamic testing with Urology   Orders:    Ambulatory Referral to Urology; Future

## 2025-04-11 NOTE — ASSESSMENT & PLAN NOTE
Recent episode of AMS and erratic behavior   Inpt work up with noted: normal B12, normal TSH/T4/T3, blood cultures negative, CBC, UA w/o infectious markers, negative UDS, normal procal, normal ammonia. MRI wnl. Improved with tx of wernickes with high dose thiamine. Pt noted as back to baseline mental status     C/w thiamine supplement and c/w holding ETOH use

## 2025-04-14 LAB — TSI SER-ACNC: <0.1 IU/L (ref 0–0.55)

## 2025-04-16 ENCOUNTER — PATIENT OUTREACH (OUTPATIENT)
Dept: CASE MANAGEMENT | Facility: OTHER | Age: 60
End: 2025-04-16

## 2025-04-16 NOTE — PROGRESS NOTES
PRAVEENA MEHTA received a referral from patient's PCP as patient does not have insurance and reports needs payment plans for bills. PRAVEENA MEHTA reviewed patient's chart, assigned self to referral and called patient ([phone]). Patient did not answer, PRAVEENA MEHTA left a message including PRAVEENA MEHTA contact information and requested a call back. PRAVEENA MEHTA will attempt to outreach again at a later date.

## 2025-04-18 ENCOUNTER — PATIENT OUTREACH (OUTPATIENT)
Dept: CASE MANAGEMENT | Facility: OTHER | Age: 60
End: 2025-04-18

## 2025-04-18 NOTE — PROGRESS NOTES
PRAVEENA MEHTA received a referral from patient's PCP as patient does not have insurance and reports needs payment plans for bills. PRAVEENA MEHTA reviewed patient's chart and called patient ([phone]) a second time. Patient did not answer, PRAVEENA MEHTA left a message including PRAVEENA MEHTA contact information and requested a call back if she is interested in speaking with PRAVEENA MEHTA. PRAVEENA MEHTA will send unable to reach letter via [employer] and close. Please re-consult PRAVEENA MEHTA as needed.

## 2025-04-18 NOTE — LETTER
[address] [facility] [address]  [phone]    Re: Unable to Reach   4/18/2025       Dear Berta,    I am a [facility]  and wanted to be certain you had information to contact me should you desire assistance with or have questions about non-medical aspects of your care such as [but not limited to] medical insurance, housing, transportation, material needs, or emergency needs.  If I do not have an answer I will assist you in finding the appropriate agency or individual who can help.      Please feel free to contact me at [phone]. Thank You.    Sincerely,         Christina Jones LCSW

## 2025-05-06 ENCOUNTER — OFFICE VISIT (OUTPATIENT)
Dept: UROLOGY | Facility: AMBULATORY SURGERY CENTER | Age: 60
End: 2025-05-06
Attending: EMERGENCY MEDICINE
Payer: COMMERCIAL

## 2025-05-06 ENCOUNTER — TELEPHONE (OUTPATIENT)
Dept: UROLOGY | Facility: AMBULATORY SURGERY CENTER | Age: 60
End: 2025-05-06

## 2025-05-06 VITALS
HEIGHT: 66 IN | SYSTOLIC BLOOD PRESSURE: 114 MMHG | OXYGEN SATURATION: 94 % | DIASTOLIC BLOOD PRESSURE: 66 MMHG | HEART RATE: 95 BPM | WEIGHT: 85 LBS | BODY MASS INDEX: 13.66 KG/M2

## 2025-05-06 DIAGNOSIS — R30.0 DYSURIA: ICD-10-CM

## 2025-05-06 DIAGNOSIS — R33.9 URINARY RETENTION: Primary | ICD-10-CM

## 2025-05-06 LAB
BACTERIA UR QL AUTO: ABNORMAL /HPF
BILIRUB UR QL STRIP: NEGATIVE
CLARITY UR: ABNORMAL
COLOR UR: ABNORMAL
GLUCOSE UR STRIP-MCNC: NEGATIVE MG/DL
HGB UR QL STRIP.AUTO: NEGATIVE
KETONES UR STRIP-MCNC: NEGATIVE MG/DL
LEUKOCYTE ESTERASE UR QL STRIP: ABNORMAL
NITRITE UR QL STRIP: POSITIVE
NON-SQ EPI CELLS URNS QL MICRO: ABNORMAL /HPF
PH UR STRIP.AUTO: 7 [PH]
POST-VOID RESIDUAL VOLUME, ML POC: 413 ML
PROT UR STRIP-MCNC: NEGATIVE MG/DL
RBC #/AREA URNS AUTO: ABNORMAL /HPF
SL AMB  POCT GLUCOSE, UA: NORMAL
SL AMB LEUKOCYTE ESTERASE,UA: NORMAL
SL AMB POCT BILIRUBIN,UA: NORMAL
SL AMB POCT BLOOD,UA: NORMAL
SL AMB POCT CLARITY,UA: NORMAL
SL AMB POCT COLOR,UA: YELLOW
SL AMB POCT KETONES,UA: NORMAL
SL AMB POCT NITRITE,UA: 8
SL AMB POCT PH,UA: 6.5
SL AMB POCT SPECIFIC GRAVITY,UA: 1
SL AMB POCT URINE PROTEIN: NORMAL
SL AMB POCT UROBILINOGEN: 0.2
SP GR UR STRIP.AUTO: 1.01 (ref 1–1.03)
UROBILINOGEN UR STRIP-ACNC: <2 MG/DL
WBC #/AREA URNS AUTO: ABNORMAL /HPF
WBC CLUMPS # UR AUTO: PRESENT /UL

## 2025-05-06 PROCEDURE — 81002 URINALYSIS NONAUTO W/O SCOPE: CPT

## 2025-05-06 PROCEDURE — 87077 CULTURE AEROBIC IDENTIFY: CPT

## 2025-05-06 PROCEDURE — 81001 URINALYSIS AUTO W/SCOPE: CPT

## 2025-05-06 PROCEDURE — 87186 SC STD MICRODIL/AGAR DIL: CPT

## 2025-05-06 PROCEDURE — 87147 CULTURE TYPE IMMUNOLOGIC: CPT

## 2025-05-06 PROCEDURE — 51798 US URINE CAPACITY MEASURE: CPT

## 2025-05-06 PROCEDURE — 87086 URINE CULTURE/COLONY COUNT: CPT

## 2025-05-06 PROCEDURE — 99204 OFFICE O/P NEW MOD 45 MIN: CPT

## 2025-05-06 RX ORDER — LEVOFLOXACIN 500 MG/1
500 TABLET, FILM COATED ORAL EVERY 24 HOURS
Qty: 7 TABLET | Refills: 0 | Status: SHIPPED | OUTPATIENT
Start: 2025-05-06 | End: 2025-05-13

## 2025-05-06 NOTE — PROGRESS NOTES
5/6/2025      Assessment and Plan    59 y.o. female new patient to [facility] for urology    Urinary retention  PVR in the office today returned at 413 mL  Patient was straight cathed in the office today and 550 mL was removed from the bladder.  We discussed further workup for urinary retention with undergoing further evaluation including urodynamic testing and/or an in office cystoscopy.  Additionally, we discussed management of urinary retention including placement of a chronic Dietrich catheter, having the patient return for a teaching session to learn how to perform intermittent self-catheterization, or placement of a suprapubic tube.  After discussion, the patient deferred scheduling urodynamics or an in office cystoscopy at this time.  Patient deferred Dietrich catheter placement today's office visit.  Patient did agree to proceed with scheduling a an office teaching session to learn how to perform intermittent self catheterization.  Patient will return to the office to learn how to perform intermittent self-catheterization.  I recommend the patient perform this 4 times daily with a 16 Urdu straight catheter for treatment of her chronic urinary retention.    Dysuria  Urine dip in the office today revealed large leukocytes and nitrites  We discussed that her urine dip is suspicious for an ongoing UTI given her ongoing dysuria.  We discussed that a 7-day course of broad-spectrum antibiotics will be sent to her pharmacy to initiate today to cover for potential UTI.  We discussed that her urine sample will be sent for urinalysis and urine culture and our office will call with results.  It was noted that we may need to adjust antibiotics pending the culture and sensitivity.  Will monitor for finalized urine study results and adjust antibiotics as indicated        History of Present Illness  Berta Smart is a 59 y.o. female here for evaluation of urinary retention and urinary tract infection.  Patient has a  "significant past medical history for alcoholism, tobacco use disorder, and severe protein calorie malnutrition.  Patient was hospitalized 4/8/2025 for acute encephalopathy and was found to be in urinary retention at that time.  A Dietrich catheter was placed at the time of her hospitalization and was fortunately able to pass a voiding trial prior to discharge.  Patient underwent CT abdomen and pelvis with contrast performed 4/2/2025 which noted marked bladder distention consistent with urinary retention.    Today, the patient reports that her inability to completely empty her bladder has been a longstanding issue.  Patient notes that she believes that this has been ongoing since early childhood.  Patient notes that she never feels as if she has completely emptied her bladder after going and has a lot of frequency and urgency.  Furthermore, she endorses mild dysuria when urinating.  Urine dip in the office today revealed large leukocytes and nitrites.  PVR in the office returned at 413 mL.        Review of Systems   Constitutional:  Negative for chills and fever.   HENT:  Negative for ear pain and sore throat.    Eyes:  Negative for pain and visual disturbance.   Respiratory:  Negative for cough and shortness of breath.    Cardiovascular:  Negative for chest pain and palpitations.   Gastrointestinal:  Negative for abdominal pain and vomiting.   Genitourinary:  Positive for difficulty urinating, frequency and urgency. Negative for decreased urine volume, dysuria, flank pain and hematuria.   Musculoskeletal:  Negative for arthralgias and back pain.   Skin:  Negative for color change and rash.   Neurological:  Negative for seizures and syncope.   All other systems reviewed and are negative.               Vitals  Vitals:    05/06/25 1456   BP: 114/66   BP Location: Left arm   Patient Position: Sitting   Cuff Size: Standard   Pulse: 95   SpO2: 94%   Weight: 38.6 kg (85 lb)   Height: 5' 6\" (1.676 m)       Physical " Exam  Vitals reviewed.   Constitutional:       General: She is not in acute distress.     Appearance: Normal appearance. She is not ill-appearing.   HENT:      Head: Normocephalic and atraumatic.      Nose: Nose normal.   Eyes:      General: No scleral icterus.  Pulmonary:      Effort: No respiratory distress.   Abdominal:      General: Abdomen is flat. There is no distension.      Palpations: Abdomen is soft.      Tenderness: There is no abdominal tenderness.   Musculoskeletal:         General: Normal range of motion.      Cervical back: Normal range of motion.   Skin:     General: Skin is warm.      Coloration: Skin is not jaundiced.   Neurological:      Mental Status: She is alert and oriented to person, place, and time.      Gait: Gait normal.   Psychiatric:         Mood and Affect: Mood normal.         Behavior: Behavior normal.           Past History  Past Medical History:   Diagnosis Date    Alcoholism (HCC)     Depression     Hypothyroidism     PTSD (post-traumatic stress disorder)      Social History     Socioeconomic History    Marital status: /Civil Union     Spouse name: None    Number of children: None    Years of education: None    Highest education level: None   Occupational History    None   Tobacco Use    Smoking status: Every Day     Types: Cigarettes    Smokeless tobacco: Current   Vaping Use    Vaping status: Never Used   Substance and Sexual Activity    Alcohol use: Yes     Comment: drinks beer    Drug use: Not Currently     Comment: Denies use    Sexual activity: Not Currently   Other Topics Concern    None   Social History Narrative    None     Social Drivers of Health     Financial Resource Strain: Not on file   Food Insecurity: Food Insecurity Present (4/4/2025)    Nursing - Inadequate Food Risk Classification     Worried About Running Out of Food in the Last Year: Not on file     Ran Out of Food in the Last Year: Not on file     Ran Out of Food in the Last Year: Sometimes true    Transportation Needs: No Transportation Needs (2025)    Nursing - Transportation Risk Classification     Lack of Transportation: Not on file     Lack of Transportation: No   Physical Activity: Inactive (2020)    Exercise Vital Sign     Days of Exercise per Week: 0 days     Minutes of Exercise per Session: 0 min   Stress: Stress Concern Present (2020)    Citizen of [place] [employer] [employer] - Occupational Stress Questionnaire     Feeling of Stress : Very much   Social Connections: Moderately Isolated (2020)    Social Connection and Isolation Panel [NHANES]     Frequency of Communication with Friends and Family: More than three times a week     Frequency of Social Gatherings with Friends and Family: Never     Attends [employer] Services: Never     Active Member of Clubs or Organizations: No     Attends Club or Organization Meetings: Never     Marital Status:    Intimate Partner Violence: Unknown (2025)    Nursing IPS     Feels Physically and Emotionally Safe: Not on file     Physically Hurt by Someone: Not on file     Humiliated or Emotionally Abused by Someone: Not on file     Physically Hurt by Someone: No     Hurt or Threatened by Someone: No   Housing Stability: At Risk (2025)    Nursing: Inadequate Housing Risk Classification     Has Housing: Not on file     Worried About Losing Housing: Not on file     Unable to Get Utilities: Not on file     Unable to Pay for Housing in the Last Year: Yes     Has Housin     Social History     Tobacco Use   Smoking Status Every Day    Types: Cigarettes   Smokeless Tobacco Current     Family History   Problem Relation Age of Onset    Heart disease Mother     Stroke Mother     Hypertension Mother     Kidney disease Mother     No Known Problems Father     No Known Problems Sister     No Known Problems Brother        The following portions of the patient's history were reviewed and updated as appropriate: allergies, current medications,  "past medical history, past social history, past surgical history and problem list.    Results  Recent Results (from the past hour)   POCT urine dip    Collection Time: 05/06/25  3:51 PM   Result Value Ref Range    LEUKOCYTE ESTERASE,UA large     NITRITE,UA 8     SL AMB POCT UROBILINOGEN 0.2     POCT URINE PROTEIN -      PH,UA 6.5     BLOOD,UA -     SPECIFIC GRAVITY,UA 1.000     KETONES,UA -     BILIRUBIN,UA -     GLUCOSE, UA -      COLOR,UA yellow     CLARITY,UA cloudy    ]  No results found for: \"PSA\"  Lab Results   Component Value Date    GLUCOSE 133 03/13/2015    CALCIUM 9.3 04/08/2025     03/13/2015    K 4.1 04/08/2025    CO2 28 04/08/2025    CL 95 (L) 04/08/2025    BUN 11 04/08/2025    CREATININE 0.44 (L) 04/08/2025     Lab Results   Component Value Date    WBC 7.23 04/08/2025    HGB 14.4 04/08/2025    HCT 42.7 04/08/2025    MCV 94 04/08/2025     04/08/2025      "

## 2025-05-06 NOTE — TELEPHONE ENCOUNTER
At check out, pt's rufus told me she needed to make a phone call and would come right back. Pt went to sit in the waiting room, but after 15 minutes I went to go see if she was done with phone call and no one was in the waiting room.     Will try to call them with next appt.     Provider note:  Return for Schedule for CIC teaching session for urinary retention. .

## 2025-05-06 NOTE — ASSESSMENT & PLAN NOTE
PVR in the office today returned at 413 mL  Patient was straight cathed in the office today and 550 mL was removed from the bladder.  We discussed further workup for urinary retention with undergoing further evaluation including urodynamic testing and/or an in office cystoscopy.  Additionally, we discussed management of urinary retention including placement of a chronic Dietrich catheter, having the patient return for a teaching session to learn how to perform intermittent self-catheterization, or placement of a suprapubic tube.  After discussion, the patient deferred scheduling urodynamics or an in office cystoscopy at this time.  Patient deferred Dietrich catheter placement today's office visit.  Patient did agree to proceed with scheduling a an office teaching session to learn how to perform intermittent self catheterization.  Patient will return to the office to learn how to perform intermittent self-catheterization.  I recommend the patient perform this 4 times daily with a 16 Norwegian straight catheter for treatment of her chronic urinary retention.

## 2025-05-06 NOTE — ASSESSMENT & PLAN NOTE
Urine dip in the office today revealed large leukocytes and nitrites  We discussed that her urine dip is suspicious for an ongoing UTI given her ongoing dysuria.  We discussed that a 7-day course of broad-spectrum antibiotics will be sent to her pharmacy to initiate today to cover for potential UTI.  We discussed that her urine sample will be sent for urinalysis and urine culture and our office will call with results.  It was noted that we may need to adjust antibiotics pending the culture and sensitivity.  Will monitor for finalized urine study results and adjust antibiotics as indicated

## 2025-05-08 LAB — BACTERIA UR CULT: ABNORMAL

## 2025-05-08 NOTE — TELEPHONE ENCOUNTER
Called the patient and left a VM, we are calling to schedule an appointment for CIC teaching session for urinary retention, with Alcides Tompkins, please call us at [phone] thank-you.      Please schedule CIC teaching session for urinary retention, with ADONIS Nunn  thank-you.

## 2025-05-09 ENCOUNTER — RESULTS FOLLOW-UP (OUTPATIENT)
Dept: UROLOGY | Facility: MEDICAL CENTER | Age: 60
End: 2025-05-09

## 2025-05-09 ENCOUNTER — OFFICE VISIT (OUTPATIENT)
Dept: FAMILY MEDICINE CLINIC | Facility: CLINIC | Age: 60
End: 2025-05-09
Payer: COMMERCIAL

## 2025-05-09 VITALS
TEMPERATURE: 96.9 F | HEART RATE: 118 BPM | RESPIRATION RATE: 17 BRPM | SYSTOLIC BLOOD PRESSURE: 98 MMHG | WEIGHT: 87 LBS | OXYGEN SATURATION: 98 % | HEIGHT: 66 IN | BODY MASS INDEX: 13.98 KG/M2 | DIASTOLIC BLOOD PRESSURE: 60 MMHG

## 2025-05-09 DIAGNOSIS — R33.9 URINARY RETENTION: ICD-10-CM

## 2025-05-09 DIAGNOSIS — J44.9 CHRONIC OBSTRUCTIVE PULMONARY DISEASE, UNSPECIFIED COPD TYPE (HCC): ICD-10-CM

## 2025-05-09 DIAGNOSIS — Z00.00 ANNUAL PHYSICAL EXAM: Primary | ICD-10-CM

## 2025-05-09 DIAGNOSIS — Z12.31 ENCOUNTER FOR SCREENING MAMMOGRAM FOR BREAST CANCER: ICD-10-CM

## 2025-05-09 DIAGNOSIS — R30.0 DYSURIA: Primary | ICD-10-CM

## 2025-05-09 DIAGNOSIS — F17.200 TOBACCO USE DISORDER: ICD-10-CM

## 2025-05-09 DIAGNOSIS — E87.1 HYPONATREMIA: ICD-10-CM

## 2025-05-09 DIAGNOSIS — F32.1 MODERATE MAJOR DEPRESSION, SINGLE EPISODE (HCC): ICD-10-CM

## 2025-05-09 PROCEDURE — 99396 PREV VISIT EST AGE 40-64: CPT | Performed by: STUDENT IN AN ORGANIZED HEALTH CARE EDUCATION/TRAINING PROGRAM

## 2025-05-09 RX ORDER — SULFAMETHOXAZOLE AND TRIMETHOPRIM 800; 160 MG/1; MG/1
1 TABLET ORAL EVERY 12 HOURS SCHEDULED
Qty: 14 TABLET | Refills: 0 | Status: SHIPPED | OUTPATIENT
Start: 2025-05-09 | End: 2025-05-16

## 2025-05-09 RX ORDER — FLUTICASONE PROPIONATE AND SALMETEROL 100; 50 UG/1; UG/1
1 POWDER RESPIRATORY (INHALATION) 2 TIMES DAILY
Qty: 60 BLISTER | Refills: 3 | Status: ON HOLD | OUTPATIENT
Start: 2025-05-09 | End: 2025-06-08

## 2025-05-09 NOTE — PATIENT INSTRUCTIONS
"Patient Education     Routine physical for adults   The Basics   Written by the doctors and editors at [facility]   What is a physical? -- A physical is a routine visit, or \"check-up,\" with your doctor. You might also hear it called a \"wellness visit\" or \"preventive visit.\"  During each visit, the doctor will:   Ask about your physical and mental health   Ask about your habits, behaviors, and lifestyle   Do an exam   Give you vaccines if needed   Talk to you about any medicines you take   Give advice about your health   Answer your questions  Getting regular check-ups is an important part of taking care of your health. It can help your doctor find and treat any problems you have. But it's also important for preventing health problems.  A routine physical is different from a \"sick visit.\" A sick visit is when you see a doctor because of a health concern or problem. Since physicals are scheduled ahead of time, you can think about what you want to ask the doctor.  How often should I get a physical? -- It depends on your age and health. In general, for people age 21 years and older:   If you are younger than 50 years, you might be able to get a physical every 3 years.   If you are 50 years or older, your doctor might recommend a physical every year.  If you have an ongoing health condition, like diabetes or high blood pressure, your doctor will probably want to see you more often.  What happens during a physical? -- In general, each visit will include:   Physical exam - The doctor or nurse will check your height, weight, heart rate, and blood pressure. They will also look at your eyes and ears. They will ask about how you are feeling and whether you have any symptoms that bother you.   Medicines - It's a good idea to bring a list of all the medicines you take to each doctor visit. Your doctor will talk to you about your medicines and answer any questions. Tell them if you are having any side effects that bother you. You " "should also tell them if you are having trouble paying for any of your medicines.   Habits and behaviors - This includes:   Your diet   Your exercise habits   Whether you smoke, drink alcohol, or use drugs   Whether you are sexually active   Whether you feel safe at home  Your doctor will talk to you about things you can do to improve your health and lower your risk of health problems. They will also offer help and support. For example, if you want to quit smoking, they can give you advice and might prescribe medicines. If you want to improve your diet or get more physical activity, they can help you with this, too.   Lab tests, if needed - The tests you get will depend on your age and situation. For example, your doctor might want to check your:   Cholesterol   Blood sugar   Iron level   Vaccines - The recommended vaccines will depend on your age, health, and what vaccines you already had. Vaccines are very important because they can prevent certain serious or deadly infections.   Discussion of screening - \"Screening\" means checking for diseases or other health problems before they cause symptoms. Your doctor can recommend screening based on your age, risk, and preferences. This might include tests to check for:   Cancer, such as breast, prostate, cervical, ovarian, colorectal, prostate, lung, or skin cancer   Sexually transmitted infections, such as chlamydia and gonorrhea   Mental health conditions like depression and anxiety  Your doctor will talk to you about the different types of screening tests. They can help you decide which screenings to have. They can also explain what the results might mean.   Answering questions - The physical is a good time to ask the doctor or nurse questions about your health. If needed, they can refer you to other doctors or specialists, too.  Adults older than 65 years often need other care, too. As you get older, your doctor will talk to you about:   How to prevent falling at " home   Hearing or vision tests   Memory testing   How to take your medicines safely   Making sure that you have the help and support you need at home  All topics are updated as new evidence becomes available and our peer review process is complete.  This topic retrieved from [employer] on: May 02, 2024.  Topic 818480 Version 1.0  Release: 32.4.3 - C32.122  © 2024 UpToDate, Inc. and/or its affiliates. All rights reserved.  Consumer Information Use and Disclaimer   Disclaimer: This generalized information is a limited summary of diagnosis, treatment, and/or medication information. It is not meant to be comprehensive and should be used as a tool to help the user understand and/or assess potential diagnostic and treatment options. It does NOT include all information about conditions, treatments, medications, side effects, or risks that may apply to a specific patient. It is not intended to be medical advice or a substitute for the medical advice, diagnosis, or treatment of a health care provider based on the health care provider's examination and assessment of a patient's specific and unique circumstances. Patients must speak with a health care provider for complete information about their health, medical questions, and treatment options, including any risks or benefits regarding use of medications. This information does not endorse any treatments or medications as safe, effective, or approved for treating a specific patient. UpToDate, Inc. and its affiliates disclaim any warranty or liability relating to this information or the use thereof.The use of this information is governed by the Terms of Use, available at https://www.[employer].com/en/know/clinical-effectiveness-terms. 2024© UpToDate, Inc. and its affiliates and/or licensors. All rights reserved.  Copyright   © 2024 UpToDate, Inc. and/or its affiliates. All rights reserved.

## 2025-05-09 NOTE — ASSESSMENT & PLAN NOTE
LDCT due and ordered in April; discussed taper and rx options, pt declined rx at this time but notes she will taper amt    Pt to complete LDCT

## 2025-05-09 NOTE — ASSESSMENT & PLAN NOTE
C/w Urology follow up for urinary retention. Straight cath with Urology noted 550 mL output. Pt declined urodynamic testing or cystoscopy. Pt declined placement of angelo catheter. Pt agreed to be taught intermittent self catheterization    Dysuria; recent culture from Urology noted Staph epidermidis; Urology ordered bactrim; notified pt and daughter

## 2025-05-09 NOTE — PROGRESS NOTES
Adult Annual Physical  Name: Berta Smart      : 1965      MRN: [id number]  Encounter Provider: Isabella Turner MD  Encounter Date: 2025   Encounter department: [facility]    :  Assessment & Plan  Annual physical exam         Encounter for screening mammogram for breast cancer  Mammogram ordered last apt, pt reminded        Urinary retention  C/w Urology follow up for urinary retention. Straight cath with Urology noted 550 mL output. Pt declined urodynamic testing or cystoscopy. Pt declined placement of angelo catheter. Pt agreed to be taught intermittent self catheterization    Dysuria; recent culture from Urology noted Staph epidermidis; Urology ordered bactrim; notified pt and daughter        Tobacco use disorder  LDCT due and ordered in April; discussed taper and rx options, pt declined rx at this time but notes she will taper amt    Pt to complete LDCT       Moderate major depression, single episode (HCC)  Paxil ordered at last apt; pt does not desire tx at this time          Hyponatremia  Reminded pt of labs ordered last apt        Chronic obstructive pulmonary disease, unspecified COPD type (HCC)  Enrrique of COPD; discussed tx options; Advair AE discussed   RTC in 3 months for reassessment   Orders:    Fluticasone-Salmeterol (Advair Diskus) 100-50 mcg/dose inhaler; Inhale 1 puff 2 (two) times a day Rinse mouth after use.    Annual physical exam         Encounter for screening mammogram for breast cancer               Preventive Screenings:  - Diabetes Screening: screening up-to-date  - Hepatitis C screening: screening up-to-date   - HIV screening: screening up-to-date   - Lung cancer screening: screening not indicated     Immunizations:  - Immunizations due: Prevnar 20, Tdap and Zoster (Shingrix)      Tobacco Cessation Counseling: Tobacco cessation counseling was provided. The patient is sincerely urged to quit consumption of tobacco. She is not ready to quit tobacco.  Medication options and side effects of medication discussed. Patient refused medication.         History of Present Illness     Adult Annual Physical:  Patient presents for annual physical. 58 yo F w/ markos of colon resection with colostomy in place, prior EtOH use presenting for follow up. Since last apt, she has seen Urology for chronic urinary retention; PVR noted to be 413 mL      .     Diet and Physical Activity:  - Diet/Nutrition: no special diet.  - Exercise: no formal exercise.    General Health:  - Sleep: sleeps poorly.  - Hearing: normal hearing bilateral ears.  - Vision: vision problems and most recent eye exam > 1 year ago.  - Dental: no dental visits for > 1 year.    /GYN Health:  - Follows with GYN: no.   - History of STDs: no    Advanced Care Planning:  - Has an advanced directive?: no    - Has a durable medical POA?: no    - ACP document given to patient?: yes      Review of Systems   Constitutional:  Negative for chills and fever.   HENT:  Negative for trouble swallowing.    Eyes:  Negative for visual disturbance.   Respiratory:  Negative for cough.    Cardiovascular:  Negative for chest pain and palpitations.   Gastrointestinal:  Negative for abdominal pain and blood in stool.   Genitourinary:  Positive for dysuria. Negative for hematuria.   Musculoskeletal:  Negative for joint swelling.   Skin:  Negative for color change and rash.   Neurological:  Negative for light-headedness.   All other systems reviewed and are negative.    Medical History Reviewed by provider this encounter:  Tobacco  Allergies  Meds  Problems  Med Hx  Surg Hx  Fam Hx     .  Current Outpatient Medications on File Prior to Visit   Medication Sig Dispense Refill    levofloxacin (LEVAQUIN) 500 mg tablet Take 1 tablet (500 mg total) by mouth every 24 hours for 7 days 7 tablet 0    Thiamine HCl (vitamin B-1) 250 MG tablet Take 250 mg by mouth daily      docusate sodium (COLACE) 100 mg capsule Take 1 capsule (100 mg total) by  "mouth every 12 (twelve) hours (Patient not taking: Reported on 5/6/2025) 30 capsule 0    Ostomy Supplies (Premier Colostomy/Ileostomy) KIT by Does not apply route daily (Patient not taking: Reported on 5/9/2025) 1 kit 0    PARoxetine (PAXIL) 10 mg tablet Take 1 tablet (10 mg total) by mouth daily (Patient not taking: Reported on 5/6/2025) 90 tablet 3     No current facility-administered medications on file prior to visit.      Social History     Tobacco Use    Smoking status: Every Day     Types: Cigarettes    Smokeless tobacco: Current   Vaping Use    Vaping status: Never Used   Substance and Sexual Activity    Alcohol use: Yes     Comment: drinks beer    Drug use: Not Currently     Comment: Denies use    Sexual activity: Not Currently       Objective   BP 98/60 (BP Location: Right arm, Patient Position: Sitting, Cuff Size: Standard)   Pulse (!) 118   Temp (!) 96.9 °F (36.1 °C) (Tympanic)   Resp 17   Ht 5' 6\" (1.676 m)   Wt 39.5 kg (87 lb)   SpO2 98%   BMI 14.04 kg/m²     Physical Exam  Vitals and nursing note reviewed.   Constitutional:       General: She is not in acute distress.     Appearance: She is well-developed.   HENT:      Head: Normocephalic and atraumatic.      Right Ear: Tympanic membrane, ear canal and external ear normal.      Left Ear: Tympanic membrane, ear canal and external ear normal.      Nose: Nose normal.   Eyes:      Conjunctiva/sclera: Conjunctivae normal.   Cardiovascular:      Rate and Rhythm: Normal rate and regular rhythm.      Pulses: Normal pulses.      Heart sounds: Normal heart sounds. No murmur heard.  Pulmonary:      Effort: Pulmonary effort is normal. No respiratory distress.      Breath sounds: Normal breath sounds. No wheezing or rales.   Abdominal:      General: Bowel sounds are normal.      Palpations: Abdomen is soft.      Tenderness: There is no abdominal tenderness.   Musculoskeletal:         General: No swelling.      Cervical back: Normal range of motion and neck " supple.      Right lower leg: No edema.      Left lower leg: No edema.   Skin:     General: Skin is warm and dry.      Capillary Refill: Capillary refill takes less than 2 seconds.   Neurological:      Mental Status: She is alert.   Psychiatric:         Mood and Affect: Mood normal.       Administrative Statements   I have spent a total time of 30 minutes in caring for this patient on the day of the visit/encounter including Prognosis, Risks and benefits of tx options, Instructions for management, Patient and family education, Importance of tx compliance, Risk factor reductions, Impressions, Counseling / Coordination of care, Documenting in the medical record, and Reviewing/placing orders in the medical record (including tests, medications, and/or procedures).

## 2025-05-12 ENCOUNTER — HOSPITAL ENCOUNTER (EMERGENCY)
Facility: HOSPITAL | Age: 60
Discharge: HOME | End: 2025-05-12
Attending: EMERGENCY MEDICINE
Payer: COMMERCIAL

## 2025-05-12 VITALS
HEART RATE: 84 BPM | RESPIRATION RATE: 18 BRPM | OXYGEN SATURATION: 98 % | SYSTOLIC BLOOD PRESSURE: 136 MMHG | TEMPERATURE: 98 F | DIASTOLIC BLOOD PRESSURE: 74 MMHG

## 2025-05-12 DIAGNOSIS — R33.9 URINARY RETENTION: Primary | ICD-10-CM

## 2025-05-12 LAB
ALBUMIN SERPL BCG-MCNC: 4.1 G/DL (ref 3.5–5)
ALP SERPL-CCNC: 68 U/L (ref 34–104)
ALT SERPL W P-5'-P-CCNC: 15 U/L (ref 7–52)
ANION GAP SERPL CALCULATED.3IONS-SCNC: 8 MMOL/L (ref 4–13)
AST SERPL W P-5'-P-CCNC: 14 U/L (ref 13–39)
BASOPHILS # BLD AUTO: 0.02 THOUSANDS/ÂΜL (ref 0–0.1)
BASOPHILS NFR BLD AUTO: 0 % (ref 0–1)
BILIRUB SERPL-MCNC: 0.64 MG/DL (ref 0.2–1)
BILIRUB UR QL STRIP: NEGATIVE
BUN SERPL-MCNC: 9 MG/DL (ref 5–25)
CALCIUM SERPL-MCNC: 9.3 MG/DL (ref 8.4–10.2)
CHLORIDE SERPL-SCNC: 97 MMOL/L (ref 96–108)
CLARITY UR: CLEAR
CO2 SERPL-SCNC: 27 MMOL/L (ref 21–32)
COLOR UR: YELLOW
CREAT SERPL-MCNC: 0.52 MG/DL (ref 0.6–1.3)
EOSINOPHIL # BLD AUTO: 0.42 THOUSAND/ÂΜL (ref 0–0.61)
EOSINOPHIL NFR BLD AUTO: 5 % (ref 0–6)
ERYTHROCYTE [DISTWIDTH] IN BLOOD BY AUTOMATED COUNT: 12.6 % (ref 11.6–15.1)
GFR SERPL CREATININE-BSD FRML MDRD: 104 ML/MIN/1.73SQ M
GLUCOSE SERPL-MCNC: 95 MG/DL (ref 65–140)
GLUCOSE UR STRIP-MCNC: NEGATIVE MG/DL
HCT VFR BLD AUTO: 44.5 % (ref 34.8–46.1)
HGB BLD-MCNC: 14.8 G/DL (ref 11.5–15.4)
HGB UR QL STRIP.AUTO: NEGATIVE
IMM GRANULOCYTES # BLD AUTO: 0.03 THOUSAND/UL (ref 0–0.2)
IMM GRANULOCYTES NFR BLD AUTO: 0 % (ref 0–2)
KETONES UR STRIP-MCNC: NEGATIVE MG/DL
LEUKOCYTE ESTERASE UR QL STRIP: NEGATIVE
LIPASE SERPL-CCNC: 26 U/L (ref 11–82)
LYMPHOCYTES # BLD AUTO: 0.67 THOUSANDS/ÂΜL (ref 0.6–4.47)
LYMPHOCYTES NFR BLD AUTO: 8 % (ref 14–44)
MCH RBC QN AUTO: 31 PG (ref 26.8–34.3)
MCHC RBC AUTO-ENTMCNC: 33.3 G/DL (ref 31.4–37.4)
MCV RBC AUTO: 93 FL (ref 82–98)
MONOCYTES # BLD AUTO: 0.38 THOUSAND/ÂΜL (ref 0.17–1.22)
MONOCYTES NFR BLD AUTO: 5 % (ref 4–12)
NEUTROPHILS # BLD AUTO: 6.45 THOUSANDS/ÂΜL (ref 1.85–7.62)
NEUTS SEG NFR BLD AUTO: 82 % (ref 43–75)
NITRITE UR QL STRIP: NEGATIVE
NRBC BLD AUTO-RTO: 0 /100 WBCS
PH UR STRIP.AUTO: 6.5 [PH]
PLATELET # BLD AUTO: 248 THOUSANDS/UL (ref 149–390)
PMV BLD AUTO: 10.2 FL (ref 8.9–12.7)
POTASSIUM SERPL-SCNC: 3.9 MMOL/L (ref 3.5–5.3)
PROT SERPL-MCNC: 7 G/DL (ref 6.4–8.4)
PROT UR STRIP-MCNC: NEGATIVE MG/DL
RBC # BLD AUTO: 4.78 MILLION/UL (ref 3.81–5.12)
SODIUM SERPL-SCNC: 132 MMOL/L (ref 135–147)
SP GR UR STRIP.AUTO: 1.01 (ref 1–1.03)
UROBILINOGEN UR STRIP-ACNC: <2 MG/DL
WBC # BLD AUTO: 7.97 THOUSAND/UL (ref 4.31–10.16)

## 2025-05-12 PROCEDURE — 85025 COMPLETE CBC W/AUTO DIFF WBC: CPT

## 2025-05-12 PROCEDURE — 81003 URINALYSIS AUTO W/O SCOPE: CPT

## 2025-05-12 PROCEDURE — 99284 EMERGENCY DEPT VISIT MOD MDM: CPT

## 2025-05-12 PROCEDURE — 80053 COMPREHEN METABOLIC PANEL: CPT

## 2025-05-12 PROCEDURE — 93005 ELECTROCARDIOGRAM TRACING: CPT

## 2025-05-12 PROCEDURE — 99283 EMERGENCY DEPT VISIT LOW MDM: CPT

## 2025-05-12 PROCEDURE — 36415 COLL VENOUS BLD VENIPUNCTURE: CPT

## 2025-05-12 PROCEDURE — 83690 ASSAY OF LIPASE: CPT

## 2025-05-12 RX ORDER — LIDOCAINE HYDROCHLORIDE 20 MG/ML
1 JELLY TOPICAL ONCE
Status: COMPLETED | OUTPATIENT
Start: 2025-05-12 | End: 2025-05-12

## 2025-05-12 RX ADMIN — LIDOCAINE HYDROCHLORIDE 1 APPLICATION: 20 JELLY TOPICAL at 15:52

## 2025-05-12 NOTE — TELEPHONE ENCOUNTER
Patient's daughter called to schedule CIC teaching session. States patient is unable to fully empty her bladder since last appt. Please call daughter at [phone] for scheduling.

## 2025-05-12 NOTE — ED PROVIDER NOTES
Time reflects when diagnosis was documented in both MDM as applicable and the Disposition within this note       Time User Action Codes Description Comment    5/12/2025  4:34 PM Alessia Stacy Add [R33.9] Urinary retention           ED Disposition       ED Disposition   Discharge    Condition   Stable    Date/Time   Mon May 12, 2025  4:34 PM    Comment   Berta Smart discharge to home/self care.                   Assessment & Plan       Medical Decision Making  DDX including but not limited to: UTI, urinary retention, hematuria    The patient presents with acute urinary retention over the last few days.  Bladder scan with greater than 600 mL, Dietrich catheter placed with relief of patient's feeling of bladder fullness and pressure.  UA sent and without evidence of infection or hematuria.  Labs obtained due to outlet obstruction and without evidence of SAMANTHA.  Patient with subjective fevers and chills x 1 month, afebrile here, no leukocytosis.  Has been compliant with the Bactrim as directed.  Abdominal exam is soft, flat, benign.  No evidence of confusion or altered mental status on exam.  I shared the results with both the patient and her daughter, they feel much reassured and feel good going home with Dietrich catheter in place until follow-up with urology.    Problems Addressed:  Urinary retention: acute illness or injury    Amount and/or Complexity of Data Reviewed  Independent Historian:      Details: Patient's daughter    Risk  OTC drugs.  Prescription drug management.             Medications   lidocaine (URO-JET) 2 % urethral/mucosal gel 1 Application (1 Application Urethral Given 5/12/25 1552)       ED Risk Strat Scores                    No data recorded        SBIRT 22yo+      Flowsheet Row Most Recent Value   Initial Alcohol Screen: US AUDIT-C     1. How often do you have a drink containing alcohol? 0 Filed at: 05/12/2025 7667   2. How many drinks containing alcohol do you have on a typical day you are drinking?  " 0 Filed at: 05/12/2025 1451   3b. FEMALE Any Age, or MALE 65+: How often do you have 4 or more drinks on one occassion? 0 Filed at: 05/12/2025 1451   Audit-C Score 0 Filed at: 05/12/2025 1451   ROZINA: How many times in the past year have you...    Used an illegal drug or used a prescription medication for non-medical reasons? Never Filed at: 05/12/2025 1451                            History of Present Illness       Chief Complaint   Patient presents with    Urinary Retention     Pt to ED with daughter. Pt states \"I need to have a catheter put in\". Pt daughter reports that pt has been having difficulty urinating since the end of May. Daughter reports that pt is currently taking an abx for a UTI. Pt c/o abd pain, burning with urination, and increased confusion.        Past Medical History:   Diagnosis Date    Alcoholism (HCC)     Depression     Hypothyroidism     PTSD (post-traumatic stress disorder)       Past Surgical History:   Procedure Laterality Date    COLOSTOMY      CYSTOSCOPY W/ URETERAL STENT PLACEMENT Left     EXPLORATORY LAPAROTOMY      SALPINGECTOMY Bilateral 02/2025      Family History   Problem Relation Age of Onset    Heart disease Mother     Stroke Mother     Hypertension Mother     Kidney disease Mother     No Known Problems Father     No Known Problems Sister     No Known Problems Brother       Social History     Tobacco Use    Smoking status: Every Day     Types: Cigarettes    Smokeless tobacco: Current   Vaping Use    Vaping status: Never Used   Substance Use Topics    Alcohol use: Yes     Comment: drinks beer    Drug use: Not Currently     Comment: Denies use      E-Cigarette/Vaping    E-Cigarette Use Never User       E-Cigarette/Vaping Substances    Nicotine No     THC No     CBD No     Flavoring No     Other No     Unknown No       I have reviewed and agree with the history as documented.     The patient is a 59-year-old female presenting for evaluation of acute urinary retention.  The patient " reports of the past month having intermittent difficulty with urination and urinary retention.  She reports seeing the urologist 6 days ago and they performed a straight catheterization at that time due to acute urinary retention.  She was started on Levaquin and they switched her to Bactrim after the urine culture results showed staph epidermis.  She notes despite being on antibiotics she has had ongoing urinary retention and feels that her bladder is very full right now.  She reports waxing and waning subjective fevers and chills but has not been monitoring her temperatures.  She denies headache, lightheadedness or dizziness, chest pain, shortness of breath, upper abdominal pain, N/V, flank pain, and hematuria.  Her daughter at bedside feels like she was slightly more confused earlier but she is now at her normal baseline.      History provided by:  Patient   used: No        Review of Systems   Constitutional:  Negative for chills and fever.   Respiratory:  Negative for cough and shortness of breath.    Cardiovascular:  Negative for chest pain.   Gastrointestinal:  Negative for abdominal pain, diarrhea, nausea and vomiting.   Genitourinary:  Positive for decreased urine volume, difficulty urinating and dysuria. Negative for flank pain, frequency, hematuria, urgency, vaginal bleeding, vaginal discharge and vaginal pain.   Musculoskeletal:  Negative for back pain and gait problem.   Skin:  Negative for rash and wound.   All other systems reviewed and are negative.          Objective       ED Triage Vitals [05/12/25 1449]   Temperature Pulse Blood Pressure Respirations SpO2 Patient Position - Orthostatic VS   98 °F (36.7 °C) (!) 116 147/78 18 97 % Sitting      Temp Source Heart Rate Source BP Location FiO2 (%) Pain Score    Temporal Monitor Left arm -- 9      Vitals      Date and Time Temp Pulse SpO2 Resp BP Pain Score FACES Pain Rating User   05/12/25 1609 -- 84 98 % 18 136/74 4 -- C   05/12/25  1449 98 °F (36.7 °C) 116 97 % 18 147/78 9 -- RN            Physical Exam  Vitals and nursing note reviewed.   Constitutional:       General: She is not in acute distress.     Appearance: Normal appearance. She is normal weight. She is not ill-appearing, toxic-appearing or diaphoretic.   HENT:      Head: Normocephalic and atraumatic.      Nose: Nose normal.      Mouth/Throat:      Mouth: Mucous membranes are moist.      Pharynx: Oropharynx is clear.   Eyes:      Extraocular Movements: Extraocular movements intact.      Conjunctiva/sclera: Conjunctivae normal.   Cardiovascular:      Rate and Rhythm: Normal rate.   Pulmonary:      Effort: Pulmonary effort is normal. No respiratory distress.   Abdominal:      General: There is no distension.      Palpations: Abdomen is soft.      Tenderness: There is no abdominal tenderness. There is no right CVA tenderness, left CVA tenderness, guarding or rebound.       Musculoskeletal:         General: Normal range of motion.      Cervical back: Normal range of motion and neck supple.   Skin:     General: Skin is warm and dry.      Capillary Refill: Capillary refill takes less than 2 seconds.   Neurological:      General: No focal deficit present.      Mental Status: She is alert and oriented to person, place, and time. Mental status is at baseline.         Results Reviewed       Procedure Component Value Units Date/Time    UA w Reflex to Microscopic w Reflex to Culture [[id number]] Collected: 05/12/25 1555    Lab Status: Final result Specimen: Urine, Indwelling Dietrich Catheter Updated: 05/12/25 1614     Color, UA Yellow     Clarity, UA Clear     Specific Gravity, UA 1.015     pH, UA 6.5     Leukocytes, UA Negative     Nitrite, UA Negative     Protein, UA Negative mg/dl      Glucose, UA Negative mg/dl      Ketones, UA Negative mg/dl      Urobilinogen, UA <2.0 mg/dl      Bilirubin, UA Negative     Occult Blood, UA Negative    Comprehensive metabolic panel [[id number]]  (Abnormal)  Collected: 05/12/25 1528    Lab Status: Final result Specimen: Blood from Arm, Right Updated: 05/12/25 1549     Sodium 132 mmol/L      Potassium 3.9 mmol/L      Chloride 97 mmol/L      CO2 27 mmol/L      ANION GAP 8 mmol/L      BUN 9 mg/dL      Creatinine 0.52 mg/dL      Glucose 95 mg/dL      Calcium 9.3 mg/dL      AST 14 U/L      ALT 15 U/L      Alkaline Phosphatase 68 U/L      Total Protein 7.0 g/dL      Albumin 4.1 g/dL      Total Bilirubin 0.64 mg/dL      eGFR 104 ml/min/1.73sq m     Narrative:      National Kid[employer] Foundation guidelines for Chronic Kidney Disease (CKD):     Stage 1 with normal or high GFR (GFR > 90 mL/min/1.73 square meters)    Stage 2 Mild CKD (GFR = 60-89 mL/min/1.73 square meters)    Stage 3A Moderate CKD (GFR = 45-59 mL/min/1.73 square meters)    Stage 3B Moderate CKD (GFR = 30-44 mL/min/1.73 square meters)    Stage 4 Severe CKD (GFR = 15-29 mL/min/1.73 square meters)    Stage 5 End Stage CKD (GFR <15 mL/min/1.73 square meters)  Note: GFR calculation is accurate only with a steady state creatinine    Lipase [[id number]]  (Normal) Collected: 05/12/25 1528    Lab Status: Final result Specimen: Blood from Arm, Right Updated: 05/12/25 1549     Lipase 26 u/L     CBC and differential [[id number]]  (Abnormal) Collected: 05/12/25 1528    Lab Status: Final result Specimen: Blood from Arm, Right Updated: 05/12/25 1535     WBC 7.97 Thousand/uL      RBC 4.78 Million/uL      Hemoglobin 14.8 g/dL      Hematocrit 44.5 %      MCV 93 fL      MCH 31.0 pg      MCHC 33.3 g/dL      RDW 12.6 %      MPV 10.2 fL      Platelets 248 Thousands/uL      nRBC 0 /100 WBCs      Segmented % 82 %      Immature Grans % 0 %      Lymphocytes % 8 %      Monocytes % 5 %      Eosinophils Relative 5 %      Basophils Relative 0 %      Absolute Neutrophils 6.45 Thousands/µL      Absolute Immature Grans 0.03 Thousand/uL      Absolute Lymphocytes 0.67 Thousands/µL      Absolute Monocytes 0.38 Thousand/µL      Eosinophils Absolute  0.42 Thousand/µL      Basophils Absolute 0.02 Thousands/µL             No orders to display       Procedures    ED Medication and Procedure Management   Prior to Admission Medications   Prescriptions Last Dose Informant Patient Reported? Taking?   Fluticasone-Salmeterol (Advair Diskus) 100-50 mcg/dose inhaler   No No   Sig: Inhale 1 puff 2 (two) times a day Rinse mouth after use.   Ostomy Supplies (Premier Colostomy/Ileostomy) KIT  Self, Child No No   Sig: by Does not apply route daily   Patient not taking: Reported on 5/9/2025   PARoxetine (PAXIL) 10 mg tablet  Self, Child No No   Sig: Take 1 tablet (10 mg total) by mouth daily   Patient not taking: Reported on 5/6/2025   Thiamine HCl (vitamin B-1) 250 MG tablet  Self, Child Yes No   Sig: Take 250 mg by mouth daily   docusate sodium (COLACE) 100 mg capsule  Self, Child No No   Sig: Take 1 capsule (100 mg total) by mouth every 12 (twelve) hours   Patient not taking: Reported on 5/6/2025   levofloxacin (LEVAQUIN) 500 mg tablet   No No   Sig: Take 1 tablet (500 mg total) by mouth every 24 hours for 7 days   sulfamethoxazole-trimethoprim (BACTRIM DS) 800-160 mg per tablet   No No   Sig: Take 1 tablet by mouth every 12 (twelve) hours for 7 days      Facility-Administered Medications: None     Discharge Medication List as of 5/12/2025  4:35 PM        CONTINUE these medications which have NOT CHANGED    Details   docusate sodium (COLACE) 100 mg capsule Take 1 capsule (100 mg total) by mouth every 12 (twelve) hours, Starting Wed 4/2/2025, Normal      Fluticasone-Salmeterol (Advair Diskus) 100-50 mcg/dose inhaler Inhale 1 puff 2 (two) times a day Rinse mouth after use., Starting Fri 5/9/2025, Until Sun 6/8/2025, Normal      levofloxacin (LEVAQUIN) 500 mg tablet Take 1 tablet (500 mg total) by mouth every 24 hours for 7 days, Starting Tue 5/6/2025, Until Tue 5/13/2025, Normal      Ostomy Supplies (Premier Colostomy/Ileostomy) KIT by Does not apply route daily, Starting Fri  8/14/2020, Print      PARoxetine (PAXIL) 10 mg tablet Take 1 tablet (10 mg total) by mouth daily, Starting Fri 4/11/2025, Normal      sulfamethoxazole-trimethoprim (BACTRIM DS) 800-160 mg per tablet Take 1 tablet by mouth every 12 (twelve) hours for 7 days, Starting Fri 5/9/2025, Until Fri 5/16/2025, Normal      Thiamine HCl (vitamin B-1) 250 MG tablet Take 250 mg by mouth daily, Historical Med           No discharge procedures on file.  ED SEPSIS DOCUMENTATION   Time reflects when diagnosis was documented in both MDM as applicable and the Disposition within this note       Time User Action Codes Description Comment    5/12/2025  4:34 PM Stacy Aggarwal Add [R33.9] Urinary retention                  SUZY Cruz  05/12/25 4295

## 2025-05-12 NOTE — TELEPHONE ENCOUNTER
Called and left general VM to give our office a call back . If patient calls back please relay message from provider below.      ----- Message from Alcides Tompkins PA-C sent at 5/9/2025  4:06 PM EDT -----  Please call the patient advise her that I reviewed her urine culture that was sent from our office visit.  The urine came back positive for an ongoing UTI.  The patient was preemptively initiated on levofloxacin, which does not cover the bacteria that she grew.    I have sent in a 7-day course of Bactrim to the patient's pharmacy which should adequately treat her ongoing UTI.

## 2025-05-12 NOTE — TELEPHONE ENCOUNTER
Called [phone] and LVNICOLAS for scheduling an appointment with office call back [phone]    When calls back please schedule CIC teaching- that's a 60 minute nurse visit. Thank you

## 2025-05-12 NOTE — ED NOTES
Pt refusing to change from sweatshirt into hospital gown. Explained to pt I cannot put in an IV with her sweatshirt on. Pt now refusing IV and IVF. Provider aware.      Becky Torres RN  05/12/25 6949       Becky Torres RN  05/12/25 0617

## 2025-05-12 NOTE — ED NOTES
Education provided to pt and her daughter regarding angelo care, including switching from standard drainage bag to leg bag.      Becky Torres RN  05/12/25 4861

## 2025-05-12 NOTE — TELEPHONE ENCOUNTER
Pt daughter returning call to schedule CIC teaching. Pt scheduled for 5/16/25 @ 1030am for 60 min nurse visit as per previous notes. No further action needed at this time.

## 2025-05-13 NOTE — TELEPHONE ENCOUNTER
2nd attempt to call patient. LVM as well as a [employer] message with the below message and information from ADONIS Mcgrath. Phone number for urology left in both messages.         ----- Message from Alcides Tompkins PA-C sent at 5/9/2025  4:06 PM EDT -----  Please call the patient advise her that I reviewed her urine culture that was sent from our office visit.  The urine came back positive for an ongoing UTI.  The patient was preemptively initiated on levofloxacin, which does not cover the bacteria that she grew.    I have sent in a 7-day course of Bactrim to the patient's pharmacy which should adequately treat her ongoing UTI.

## 2025-05-14 LAB
ATRIAL RATE: 104 BPM
P AXIS: 76 DEGREES
PR INTERVAL: 142 MS
QRS AXIS: 236 DEGREES
QRSD INTERVAL: 74 MS
QT INTERVAL: 334 MS
QTC INTERVAL: 440 MS
T WAVE AXIS: 61 DEGREES
VENTRICULAR RATE: 104 BPM

## 2025-05-14 PROCEDURE — 93010 ELECTROCARDIOGRAM REPORT: CPT | Performed by: INTERNAL MEDICINE

## 2025-05-16 ENCOUNTER — PROCEDURE VISIT (OUTPATIENT)
Dept: UROLOGY | Facility: AMBULATORY SURGERY CENTER | Age: 60
End: 2025-05-16
Payer: COMMERCIAL

## 2025-05-16 VITALS
DIASTOLIC BLOOD PRESSURE: 60 MMHG | BODY MASS INDEX: 13.18 KG/M2 | SYSTOLIC BLOOD PRESSURE: 98 MMHG | HEART RATE: 99 BPM | HEIGHT: 67 IN | OXYGEN SATURATION: 97 % | WEIGHT: 84 LBS

## 2025-05-16 DIAGNOSIS — R33.9 URINARY RETENTION: Primary | ICD-10-CM

## 2025-05-16 PROCEDURE — 99211 OFF/OP EST MAY X REQ PHY/QHP: CPT

## 2025-05-16 NOTE — PROGRESS NOTES
"5/16/2025    Berta Smart is a 59 y.o. female  [id number]    Diagnosis:  Chief Complaint    CIC Teaching       Patient presents for Clean intermittent catheterization teaching managed by our office    Plan:  Patient provided with catheter samples and a copy of written instructions  Patient will catheterize 4 x daily  Patient will follow up as scheduled in June.  Patient knows to call the office with any concerns, problems with supplies or difficulty passing catheter.  Patient will be contacted on Monday to assess how she is doing and which brand of catheters are working best for her and order will be placed.      Vitals:    05/16/25 1032   BP: 98/60   BP Location: Left arm   Patient Position: Sitting   Cuff Size: Adult   Pulse: 99   SpO2: 97%   Weight: 38.1 kg (84 lb)   Height: 5' 7\" (1.702 m)       Teaching:    Patient presents to the office with her daughter by her side for CIC teaching. Patient presented to the ER on 5/12/2025 for urinary retention and catheter was placed.     Instructions were provided and reviewed of performing CIC and AP's recommendation of performing this 4x/day. Both patient and daughter are agreeable to performing this. Patient wanted daughter present in the room to see how it is done, incase there are any issues.    Catheter was removed after deflation of intact balloon with no issues. Patient tolerated well. Patient was able to demonstrate successfully of to self-catheterize herself.    Advised both patient and daughter of contacting them on Monday to see how she is doing, and to see what brand of catheters are working best to order them. They are understanding. Advised of ER precautions, as well as contacting the office in the meantime with any questions or concerns.              Rachel Larson, ANASTASIAN, RN   "

## 2025-05-18 ENCOUNTER — APPOINTMENT (EMERGENCY)
Dept: CT IMAGING | Facility: HOSPITAL | Age: 60
DRG: 426 | End: 2025-05-18
Payer: COMMERCIAL

## 2025-05-18 ENCOUNTER — HOSPITAL ENCOUNTER (INPATIENT)
Facility: HOSPITAL | Age: 60
LOS: 4 days | DRG: 426 | End: 2025-05-22
Attending: EMERGENCY MEDICINE | Admitting: INTERNAL MEDICINE
Payer: COMMERCIAL

## 2025-05-18 DIAGNOSIS — R41.82 ALTERED MENTAL STATUS: ICD-10-CM

## 2025-05-18 DIAGNOSIS — E87.1 HYPONATREMIA: Primary | ICD-10-CM

## 2025-05-18 DIAGNOSIS — E83.42 HYPOMAGNESEMIA: ICD-10-CM

## 2025-05-18 DIAGNOSIS — G93.40 ENCEPHALOPATHY, UNSPECIFIED TYPE: ICD-10-CM

## 2025-05-18 DIAGNOSIS — F31.12 BIPOLAR 1 DISORDER WITH MODERATE MANIA (HCC): ICD-10-CM

## 2025-05-18 DIAGNOSIS — Z00.8 MEDICAL CLEARANCE FOR PSYCHIATRIC ADMISSION: ICD-10-CM

## 2025-05-18 LAB
ALBUMIN SERPL BCG-MCNC: 4.2 G/DL (ref 3.5–5)
ALP SERPL-CCNC: 80 U/L (ref 34–104)
ALT SERPL W P-5'-P-CCNC: 11 U/L (ref 7–52)
AMPHETAMINES SERPL QL SCN: NEGATIVE
ANION GAP SERPL CALCULATED.3IONS-SCNC: 8 MMOL/L (ref 4–13)
APAP SERPL-MCNC: <2 UG/ML (ref 10–20)
AST SERPL W P-5'-P-CCNC: 13 U/L (ref 13–39)
ATRIAL RATE: 71 BPM
ATRIAL RATE: 82 BPM
ATRIAL RATE: 83 BPM
BACTERIA UR QL AUTO: NORMAL /HPF
BARBITURATES UR QL: NEGATIVE
BASOPHILS # BLD MANUAL: 0 THOUSAND/UL (ref 0–0.1)
BASOPHILS NFR MAR MANUAL: 0 % (ref 0–1)
BENZODIAZ UR QL: NEGATIVE
BILIRUB SERPL-MCNC: 0.37 MG/DL (ref 0.2–1)
BILIRUB UR QL STRIP: NEGATIVE
BNP SERPL-MCNC: 25 PG/ML (ref 0–100)
BUN SERPL-MCNC: 8 MG/DL (ref 5–25)
CALCIUM SERPL-MCNC: 9.7 MG/DL (ref 8.4–10.2)
CARDIAC TROPONIN I PNL SERPL HS: <2 NG/L (ref ?–50)
CHLORIDE SERPL-SCNC: 89 MMOL/L (ref 96–108)
CLARITY UR: ABNORMAL
CO2 SERPL-SCNC: 32 MMOL/L (ref 21–32)
COCAINE UR QL: NEGATIVE
COLOR UR: YELLOW
CREAT SERPL-MCNC: 0.47 MG/DL (ref 0.6–1.3)
EOSINOPHIL # BLD MANUAL: 0.5 THOUSAND/UL (ref 0–0.4)
EOSINOPHIL NFR BLD MANUAL: 7 % (ref 0–6)
ERYTHROCYTE [DISTWIDTH] IN BLOOD BY AUTOMATED COUNT: 12.7 % (ref 11.6–15.1)
ETHANOL SERPL-MCNC: <10 MG/DL
FENTANYL UR QL SCN: NEGATIVE
GFR SERPL CREATININE-BSD FRML MDRD: 107 ML/MIN/1.73SQ M
GLUCOSE SERPL-MCNC: 116 MG/DL (ref 65–140)
GLUCOSE UR STRIP-MCNC: NEGATIVE MG/DL
HCT VFR BLD AUTO: 43.4 % (ref 34.8–46.1)
HGB BLD-MCNC: 14.8 G/DL (ref 11.5–15.4)
HGB UR QL STRIP.AUTO: NEGATIVE
HYDROCODONE UR QL SCN: NEGATIVE
KETONES UR STRIP-MCNC: NEGATIVE MG/DL
LACTATE SERPL-SCNC: 1.4 MMOL/L (ref 0.5–2)
LEUKOCYTE ESTERASE UR QL STRIP: ABNORMAL
LYMPHOCYTES # BLD AUTO: 3.05 THOUSAND/UL (ref 0.6–4.47)
LYMPHOCYTES # BLD AUTO: 38 % (ref 14–44)
MAGNESIUM SERPL-MCNC: 1.7 MG/DL (ref 1.9–2.7)
MCH RBC QN AUTO: 31.6 PG (ref 26.8–34.3)
MCHC RBC AUTO-ENTMCNC: 34.1 G/DL (ref 31.4–37.4)
MCV RBC AUTO: 93 FL (ref 82–98)
METHADONE UR QL: NEGATIVE
MONOCYTES # BLD AUTO: 0.78 THOUSAND/UL (ref 0–1.22)
MONOCYTES NFR BLD: 11 % (ref 4–12)
NEUTROPHILS # BLD MANUAL: 2.77 THOUSAND/UL (ref 1.85–7.62)
NEUTS SEG NFR BLD AUTO: 39 % (ref 43–75)
NITRITE UR QL STRIP: NEGATIVE
NON-SQ EPI CELLS URNS QL MICRO: NORMAL /HPF
OPIATES UR QL SCN: NEGATIVE
OSMOLALITY UR: 343 MMOL/KG (ref 250–900)
OXYCODONE+OXYMORPHONE UR QL SCN: NEGATIVE
P AXIS: 45 DEGREES
P AXIS: 48 DEGREES
P AXIS: 49 DEGREES
PCP UR QL: NEGATIVE
PH UR STRIP.AUTO: 7.5 [PH]
PHOSPHATE SERPL-MCNC: 4 MG/DL (ref 2.3–4.1)
PLATELET # BLD AUTO: 321 THOUSANDS/UL (ref 149–390)
PLATELET BLD QL SMEAR: ADEQUATE
PMV BLD AUTO: 9.7 FL (ref 8.9–12.7)
POTASSIUM SERPL-SCNC: 4.3 MMOL/L (ref 3.5–5.3)
PR INTERVAL: 142 MS
PR INTERVAL: 148 MS
PR INTERVAL: 152 MS
PROCALCITONIN SERPL-MCNC: <0.05 NG/ML
PROT SERPL-MCNC: 7.9 G/DL (ref 6.4–8.4)
PROT UR STRIP-MCNC: NEGATIVE MG/DL
QRS AXIS: 150 DEGREES
QRS AXIS: 163 DEGREES
QRS AXIS: 167 DEGREES
QRSD INTERVAL: 78 MS
QT INTERVAL: 378 MS
QT INTERVAL: 382 MS
QT INTERVAL: 390 MS
QTC INTERVAL: 423 MS
QTC INTERVAL: 444 MS
QTC INTERVAL: 446 MS
RBC # BLD AUTO: 4.69 MILLION/UL (ref 3.81–5.12)
RBC #/AREA URNS AUTO: NORMAL /HPF
RBC MORPH BLD: NORMAL
SALICYLATES SERPL-MCNC: <5 MG/DL (ref 5–20)
SODIUM SERPL-SCNC: 129 MMOL/L (ref 135–147)
SODIUM UR-SCNC: 85 MMOL/L
SP GR UR STRIP.AUTO: 1.01 (ref 1–1.03)
T WAVE AXIS: 11 DEGREES
T WAVE AXIS: 17 DEGREES
T WAVE AXIS: 18 DEGREES
T4 FREE SERPL-MCNC: 1.06 NG/DL (ref 0.61–1.12)
THC UR QL: NEGATIVE
TSH SERPL DL<=0.05 MIU/L-ACNC: 4.74 UIU/ML (ref 0.45–4.5)
URATE SERPL-MCNC: 2.9 MG/DL (ref 2–7.5)
UROBILINOGEN UR STRIP-ACNC: <2 MG/DL
VARIANT LYMPHS # BLD AUTO: 5 %
VENTRICULAR RATE: 71 BPM
VENTRICULAR RATE: 82 BPM
VENTRICULAR RATE: 83 BPM
WBC # BLD AUTO: 7.1 THOUSAND/UL (ref 4.31–10.16)
WBC #/AREA URNS AUTO: NORMAL /HPF

## 2025-05-18 PROCEDURE — 85027 COMPLETE CBC AUTOMATED: CPT | Performed by: EMERGENCY MEDICINE

## 2025-05-18 PROCEDURE — 80053 COMPREHEN METABOLIC PANEL: CPT | Performed by: EMERGENCY MEDICINE

## 2025-05-18 PROCEDURE — 80179 DRUG ASSAY SALICYLATE: CPT | Performed by: EMERGENCY MEDICINE

## 2025-05-18 PROCEDURE — 99285 EMERGENCY DEPT VISIT HI MDM: CPT | Performed by: EMERGENCY MEDICINE

## 2025-05-18 PROCEDURE — 36415 COLL VENOUS BLD VENIPUNCTURE: CPT | Performed by: EMERGENCY MEDICINE

## 2025-05-18 PROCEDURE — 70450 CT HEAD/BRAIN W/O DYE: CPT

## 2025-05-18 PROCEDURE — 84439 ASSAY OF FREE THYROXINE: CPT | Performed by: EMERGENCY MEDICINE

## 2025-05-18 PROCEDURE — 84300 ASSAY OF URINE SODIUM: CPT | Performed by: STUDENT IN AN ORGANIZED HEALTH CARE EDUCATION/TRAINING PROGRAM

## 2025-05-18 PROCEDURE — 84443 ASSAY THYROID STIM HORMONE: CPT | Performed by: EMERGENCY MEDICINE

## 2025-05-18 PROCEDURE — 93005 ELECTROCARDIOGRAM TRACING: CPT

## 2025-05-18 PROCEDURE — 82077 ASSAY SPEC XCP UR&BREATH IA: CPT | Performed by: EMERGENCY MEDICINE

## 2025-05-18 PROCEDURE — 96367 TX/PROPH/DG ADDL SEQ IV INF: CPT

## 2025-05-18 PROCEDURE — 96365 THER/PROPH/DIAG IV INF INIT: CPT

## 2025-05-18 PROCEDURE — 83735 ASSAY OF MAGNESIUM: CPT | Performed by: EMERGENCY MEDICINE

## 2025-05-18 PROCEDURE — 84145 PROCALCITONIN (PCT): CPT | Performed by: EMERGENCY MEDICINE

## 2025-05-18 PROCEDURE — 85007 BL SMEAR W/DIFF WBC COUNT: CPT | Performed by: EMERGENCY MEDICINE

## 2025-05-18 PROCEDURE — 80307 DRUG TEST PRSMV CHEM ANLYZR: CPT | Performed by: EMERGENCY MEDICINE

## 2025-05-18 PROCEDURE — 81001 URINALYSIS AUTO W/SCOPE: CPT | Performed by: EMERGENCY MEDICINE

## 2025-05-18 PROCEDURE — 83605 ASSAY OF LACTIC ACID: CPT | Performed by: EMERGENCY MEDICINE

## 2025-05-18 PROCEDURE — 84100 ASSAY OF PHOSPHORUS: CPT | Performed by: EMERGENCY MEDICINE

## 2025-05-18 PROCEDURE — 83880 ASSAY OF NATRIURETIC PEPTIDE: CPT | Performed by: STUDENT IN AN ORGANIZED HEALTH CARE EDUCATION/TRAINING PROGRAM

## 2025-05-18 PROCEDURE — 84550 ASSAY OF BLOOD/URIC ACID: CPT | Performed by: STUDENT IN AN ORGANIZED HEALTH CARE EDUCATION/TRAINING PROGRAM

## 2025-05-18 PROCEDURE — 84484 ASSAY OF TROPONIN QUANT: CPT | Performed by: EMERGENCY MEDICINE

## 2025-05-18 PROCEDURE — 99285 EMERGENCY DEPT VISIT HI MDM: CPT

## 2025-05-18 PROCEDURE — 83935 ASSAY OF URINE OSMOLALITY: CPT | Performed by: STUDENT IN AN ORGANIZED HEALTH CARE EDUCATION/TRAINING PROGRAM

## 2025-05-18 PROCEDURE — 80143 DRUG ASSAY ACETAMINOPHEN: CPT | Performed by: EMERGENCY MEDICINE

## 2025-05-18 PROCEDURE — 93010 ELECTROCARDIOGRAM REPORT: CPT | Performed by: INTERNAL MEDICINE

## 2025-05-18 PROCEDURE — 99223 1ST HOSP IP/OBS HIGH 75: CPT | Performed by: STUDENT IN AN ORGANIZED HEALTH CARE EDUCATION/TRAINING PROGRAM

## 2025-05-18 RX ORDER — MAGNESIUM SULFATE HEPTAHYDRATE 40 MG/ML
2 INJECTION, SOLUTION INTRAVENOUS ONCE
Status: COMPLETED | OUTPATIENT
Start: 2025-05-18 | End: 2025-05-18

## 2025-05-18 RX ORDER — NICOTINE 21 MG/24HR
1 PATCH, TRANSDERMAL 24 HOURS TRANSDERMAL DAILY
Status: DISCONTINUED | OUTPATIENT
Start: 2025-05-18 | End: 2025-05-18

## 2025-05-18 RX ORDER — ACETAMINOPHEN 325 MG/1
650 TABLET ORAL EVERY 6 HOURS PRN
Status: DISCONTINUED | OUTPATIENT
Start: 2025-05-18 | End: 2025-05-22 | Stop reason: HOSPADM

## 2025-05-18 RX ORDER — FLUTICASONE FUROATE AND VILANTEROL 100; 25 UG/1; UG/1
1 POWDER RESPIRATORY (INHALATION)
Status: DISCONTINUED | OUTPATIENT
Start: 2025-05-18 | End: 2025-05-22 | Stop reason: HOSPADM

## 2025-05-18 RX ORDER — FOLIC ACID 1 MG/1
1 TABLET ORAL DAILY
Status: DISCONTINUED | OUTPATIENT
Start: 2025-05-18 | End: 2025-05-22 | Stop reason: HOSPADM

## 2025-05-18 RX ORDER — ONDANSETRON 2 MG/ML
4 INJECTION INTRAMUSCULAR; INTRAVENOUS EVERY 4 HOURS PRN
Status: DISCONTINUED | OUTPATIENT
Start: 2025-05-18 | End: 2025-05-22 | Stop reason: HOSPADM

## 2025-05-18 RX ORDER — MAGNESIUM HYDROXIDE/ALUMINUM HYDROXICE/SIMETHICONE 120; 1200; 1200 MG/30ML; MG/30ML; MG/30ML
30 SUSPENSION ORAL EVERY 6 HOURS PRN
Status: DISCONTINUED | OUTPATIENT
Start: 2025-05-18 | End: 2025-05-22 | Stop reason: HOSPADM

## 2025-05-18 RX ORDER — POLYETHYLENE GLYCOL 3350 17 G/17G
17 POWDER, FOR SOLUTION ORAL DAILY PRN
Status: DISCONTINUED | OUTPATIENT
Start: 2025-05-18 | End: 2025-05-22 | Stop reason: HOSPADM

## 2025-05-18 RX ADMIN — MAGNESIUM SULFATE HEPTAHYDRATE 2 G: 2 INJECTION, SOLUTION INTRAVENOUS at 11:19

## 2025-05-18 RX ADMIN — THIAMINE HYDROCHLORIDE 500 MG: 100 INJECTION, SOLUTION INTRAMUSCULAR; INTRAVENOUS at 21:12

## 2025-05-18 RX ADMIN — SODIUM CHLORIDE 1000 ML: 0.9 INJECTION, SOLUTION INTRAVENOUS at 10:21

## 2025-05-18 RX ADMIN — FOLIC ACID 1 MG: 1 TABLET ORAL at 13:35

## 2025-05-18 RX ADMIN — Medication 3 MG: at 21:12

## 2025-05-18 RX ADMIN — THIAMINE HYDROCHLORIDE: 100 INJECTION, SOLUTION INTRAMUSCULAR; INTRAVENOUS at 10:45

## 2025-05-18 RX ADMIN — MULTIPLE VITAMINS W/ MINERALS TAB 1 TABLET: TAB ORAL at 13:35

## 2025-05-18 RX ADMIN — THIAMINE HYDROCHLORIDE 500 MG: 100 INJECTION, SOLUTION INTRAMUSCULAR; INTRAVENOUS at 17:12

## 2025-05-18 NOTE — PLAN OF CARE
Problem: Potential for Falls  Goal: Patient will remain free of falls  Description: INTERVENTIONS:  - Educate patient/family on patient safety including physical limitations  - Instruct patient to call for assistance with activity   - Consider consulting OT/PT to assist with strengthening/mobility based on AM PAC & JH-HLM score  - Consult OT/PT to assist with strengthening/mobility   - Keep Call bell within reach  - Keep bed low and locked with side rails adjusted as appropriate  - Keep care items and personal belongings within reach  - Initiate and maintain comfort rounds  - Make Fall Risk Sign visible to staff  - Offer Toileting every 2 Hours, in advance of need  - Initiate/Maintain 2alarm  - Obtain necessary fall risk management equipment: 2  - Apply yellow socks and bracelet for high fall risk patients  - Consider moving patient to room near nurses station  Outcome: Progressing     Problem: PAIN - ADULT  Goal: Verbalizes/displays adequate comfort level or baseline comfort level  Description: Interventions:  - Encourage patient to monitor pain and request assistance  - Assess pain using appropriate pain scale  - Administer analgesics as ordered based on type and severity of pain and evaluate response  - Implement non-pharmacological measures as appropriate and evaluate response  - Consider cultural and social influences on pain and pain management  - Notify physician/advanced practitioner if interventions unsuccessful or patient reports new pain  - Educate patient/family on pain management process including their role and importance of  reporting pain   - Provide non-pharmacologic/complimentary pain relief interventions  Outcome: Progressing     Problem: INFECTION - ADULT  Goal: Absence or prevention of progression during hospitalization  Description: INTERVENTIONS:  - Assess and monitor for signs and symptoms of infection  - Monitor lab/diagnostic results  - Monitor all insertion sites, i.e. indwelling lines,  tubes, and drains  - Monitor endotracheal if appropriate and nasal secretions for changes in amount and color  - [place] appropriate cooling/warming therapies per order  - Administer medications as ordered  - Instruct and encourage patient and family to use good hand hygiene technique  - Identify and instruct in appropriate isolation precautions for identified infection/condition  Outcome: Progressing  Goal: Absence of fever/infection during neutropenic period  Description: INTERVENTIONS:  - Monitor WBC  - Perform strict hand hygiene  - Limit to healthy visitors only  - No plants, dried, fresh or silk flowers with cowan in patient room  Outcome: Progressing     Problem: SAFETY ADULT  Goal: Patient will remain free of falls  Description: INTERVENTIONS:  - Educate patient/family on patient safety including physical limitations  - Instruct patient to call for assistance with activity   - Consider consulting OT/PT to assist with strengthening/mobility based on AM PAC & JH-HLM score  - Consult OT/PT to assist with strengthening/mobility   - Keep Call bell within reach  - Keep bed low and locked with side rails adjusted as appropriate  - Keep care items and personal belongings within reach  - Initiate and maintain comfort rounds  - Make Fall Risk Sign visible to staff  - Offer Toileting every 2 Hours, in advance of need  - Initiate/Maintain 2alarm  - Obtain necessary fall risk management equipment: 2  - Apply yellow socks and bracelet for high fall risk patients  - Consider moving patient to room near nurses station  Outcome: Progressing  Goal: Maintain or return to baseline ADL function  Description: INTERVENTIONS:  -  Assess patient's ability to carry out ADLs; assess patient's baseline for ADL function and identify physical deficits which impact ability to perform ADLs (bathing, care of mouth/teeth, toileting, grooming, dressing, etc.)  - Assess/evaluate cause of self-care deficits   - Assess range of motion  - Assess  patient's mobility; develop plan if impaired  - Assess patient's need for assistive devices and provide as appropriate  - Encourage maximum independence but intervene and supervise when necessary  - Involve family in performance of ADLs  - Assess for home care needs following discharge   - Consider OT consult to assist with ADL evaluation and planning for discharge  - Provide patient education as appropriate  - Monitor functional capacity and physical performance, use of AM PAC & JH-HLM   - Monitor gait, balance and fatigue with ambulation    Outcome: Progressing  Goal: Maintains/Returns to pre admission functional level  Description: INTERVENTIONS:  - Perform AM-PAC 6 Click Basic Mobility/ Daily Activity assessment daily.  - Set and communicate daily mobility goal to care team and patient/family/caregiver.   - Collaborate with rehabilitation services on mobility goals if consulted  - Perform Range of Motion 2 times a day.  - Reposition patient every 2 hours.  - Dangle patient 2 times a day  - Stand patient 2 times a day  - Ambulate patient 2 times a day  - Out of bed to chair 2 times a day   - Out of bed for meals 2 times a day  - Out of bed for toileting  - Record patient progress and toleration of activity level   Outcome: Progressing     Problem: DISCHARGE PLANNING  Goal: Discharge to home or other facility with appropriate resources  Description: INTERVENTIONS:  - Identify barriers to discharge w/patient and caregiver  - Arrange for needed discharge resources and transportation as appropriate  - Identify discharge learning needs (meds, wound care, etc.)  - Arrange for interpretive services to assist at discharge as needed  - Refer to Case Management Department for coordinating discharge planning if the patient needs post-hospital services based on physician/advanced practitioner order or complex needs related to functional status, cognitive ability, or social support system  Outcome: Progressing     Problem:  Knowledge Deficit  Goal: Patient/family/caregiver demonstrates understanding of disease process, treatment plan, medications, and discharge instructions  Description: Complete learning assessment and assess knowledge base.  Interventions:  - Provide teaching at level of understanding  - Provide teaching via preferred learning methods  Outcome: Progressing

## 2025-05-18 NOTE — H&P
H&P - Hospitalist   Name: Berta Smart 59 y.o. female I MRN: [id number]  Unit/Bed#: -01 I Date of Admission: 5/18/2025   Date of Service: 5/18/2025 I Hospital Day: 0     Assessment & Plan  Hyponatremia  Patient presenting due to subjective altered mental status potential UTI and suppose it aggression towards family    Patient with a recent admission on 4/4/20/2025 to 4/8/2025 for encephalopathy thought to be due to Warnicke's encephalopathy and was on high-dose thiamine.    Workup in the ED mostly negative with a slightly elevated TSH.->  Patient to f/u with endo, endo referral on discharge    Patient noted to have hyponatremia of 129  Placed on fluid restriction of 1500  Hyponatremic workup  Encephalopathy  During recent admission patient having significant confabulations and thought to have Warnicke's encephalopathy and was started on high-dose thiamine.  Workup at that time was negative.  MRI showed no acute pathology neurology was consulted and thought to be unlikely to be infectious his nature and was self-medicating with alcohol.  Psychiatry was consulted and recommended no inpatient treatment.  Neuropsych was consulted and was determined to have capacity to make own decisions    CTH showing no acute pathology    Patient continues to have confabulations.  Will continue high-dose thiamine at this time multivitamin regimen and CIWA protocol  Delirium precautions  Colostomy status (HCC)  Status post colon resection approximately 15 years ago  Daily colostomy care  Alcoholism (HCC)  CIWA protocol  Continues to endorse sensation  Severe protein-calorie malnutrition (HCC)  Malnutrition Findings:                                 BMI Findings:           Body mass index is 13.16 kg/m².         VTE Pharmacologic Prophylaxis: VTE Score: 1 Low Risk (Score 0-2) - Encourage Ambulation.  Code Status: Prior full code      Anticipated Length of Stay: Patient will be admitted on an inpatient basis with an anticipated  length of stay of greater than 2 midnights secondary to hyponatremia .    History of Present Illness   Chief Complaint: CARLOS ENRIQUE Smart is a 59 y.o. female with a PMH of encephalopathy confabulation Warnicke's encephalopathy alcohol use disorder in remission adjustment disorder severe protein calorie malnutrition colostomy status postresection urine retention who presents with subjective altered mental status with endorsement of being aggressive towards family.  Patient thought to have a UTI and therefore altered mental status.  EMS reports patient became combative towards family and that patient is not taking her medications although patient does not have any medications at baseline except for stool softeners.  Patient continues to confabulate and is a poor historian.  Patient noted to have a sodium level of 129 on admission.  And a slightly elevated TSH.  On initiate high-dose thiamine CIWA protocol and workup hyponatremia.  Patient placed on moderate fluid restriction of 1500 cc/day    Review of Systems   All other systems reviewed and are negative.      Historical Information   Past Medical History:   Diagnosis Date    Alcoholism (HCC)     Depression     Hypothyroidism     PTSD (post-traumatic stress disorder)      Past Surgical History:   Procedure Laterality Date    COLOSTOMY      CYSTOSCOPY W/ URETERAL STENT PLACEMENT Left     EXPLORATORY LAPAROTOMY      SALPINGECTOMY Bilateral 02/2025     Social History     Tobacco Use    Smoking status: Every Day     Types: Cigarettes    Smokeless tobacco: Current   Vaping Use    Vaping status: Never Used   Substance and Sexual Activity    Alcohol use: Yes     Comment: drinks beer    Drug use: Not Currently     Comment: Denies use    Sexual activity: Not Currently     E-Cigarette/Vaping    E-Cigarette Use Never User      E-Cigarette/Vaping Substances    Nicotine No     THC No     CBD No     Flavoring No     Other No     Unknown No      Family History   Problem Relation  Age of Onset    Heart disease Mother     Stroke Mother     Hypertension Mother     Kidney disease Mother     No Known Problems Father     No Known Problems Sister     No Known Problems Brother      Social History:  Marital Status: /Civil Union   Occupation:   Patient Pre-hospital Living Situation: Home  Patient Pre-hospital Level of Mobility: walks  Patient Pre-hospital Diet Restrictions: none    Meds/Allergies   I have reviewed home medications using recent Epic encounter.  Prior to Admission medications    Medication Sig Start Date End Date Taking? Authorizing Provider   docusate sodium (COLACE) 100 mg capsule Take 1 capsule (100 mg total) by mouth every 12 (twelve) hours  Patient not taking: Reported on 5/6/2025 4/2/25   Hao Green,    Fluticasone-Salmeterol (Advair Diskus) 100-50 mcg/dose inhaler Inhale 1 puff 2 (two) times a day Rinse mouth after use. 5/9/25 6/8/25  Isabella Turner MD   Ostomy Supplies (Premier Colostomy/Ileostomy) KIT by Does not apply route daily  Patient not taking: Reported on 5/9/2025 8/14/20   Sheri Stephens DO   PARoxetine (PAXIL) 10 mg tablet Take 1 tablet (10 mg total) by mouth daily  Patient not taking: Reported on 5/6/2025 4/11/25   Isabella Turner MD   Thiamine HCl (vitamin B-1) 250 MG tablet Take 250 mg by mouth daily    Historical Provider, MD     No Known Allergies    Objective :  Temp:  [97.3 °F (36.3 °C)-97.5 °F (36.4 °C)] 97.3 °F (36.3 °C)  HR:  [72-82] 82  BP: (122-135)/(77-91) 135/86  Resp:  [18-21] 20  SpO2:  [95 %-98 %] 97 %  O2 Device: None (Room air)    Physical Exam  Vitals and nursing note reviewed.   Constitutional:       General: She is not in acute distress.     Appearance: She is ill-appearing (chronically).      Comments: Thin frail   HENT:      Head: Normocephalic and atraumatic.     Cardiovascular:      Rate and Rhythm: Normal rate and regular rhythm.      Pulses: Normal pulses.      Heart sounds: Normal heart sounds.   Pulmonary:      Effort:  Pulmonary effort is normal.      Breath sounds: Normal breath sounds.   Abdominal:      General: Abdomen is flat. Bowel sounds are normal.      Palpations: Abdomen is soft.     Musculoskeletal:      Right lower leg: No edema.      Left lower leg: No edema.     Skin:     General: Skin is warm.     Neurological:      General: No focal deficit present.      Mental Status: She is alert and oriented to person, place, and time.     Psychiatric:      Comments: Confabulates           Lines/Drains:            Lab Results: I have reviewed the following results:  Results from last 7 days   Lab Units 05/18/25  0954 05/12/25  1528   WBC Thousand/uL 7.10 7.97   HEMOGLOBIN g/dL 14.8 14.8   HEMATOCRIT % 43.4 44.5   PLATELETS Thousands/uL 321 248   SEGS PCT %  --  82*   LYMPHO PCT % 38 8*   MONO PCT % 11 5   EOS PCT % 7* 5     Results from last 7 days   Lab Units 05/18/25  0954   SODIUM mmol/L 129*   POTASSIUM mmol/L 4.3   CHLORIDE mmol/L 89*   CO2 mmol/L 32   BUN mg/dL 8   CREATININE mg/dL 0.47*   ANION GAP mmol/L 8   CALCIUM mg/dL 9.7   ALBUMIN g/dL 4.2   TOTAL BILIRUBIN mg/dL 0.37   ALK PHOS U/L 80   ALT U/L 11   AST U/L 13   GLUCOSE RANDOM mg/dL 116             Lab Results   Component Value Date    HGBA1C 5.3 01/22/2015     Results from last 7 days   Lab Units 05/18/25  0954   LACTIC ACID mmol/L 1.4   PROCALCITONIN ng/ml <0.05       Imaging Results Review: I personally reviewed the following image studies in PACS and associated radiology reports: CT head. My interpretation of the radiology images/reports is: WNL.  Other Study Results Review: EKG was reviewed.  EKG was personally reviewed and my interpretation is: Personally Reviewed. NSR. HR 80..    Administrative Statements   I have spent a total time of 80 minutes in caring for this patient on the day of the visit/encounter including Diagnostic results, Prognosis, Risks and benefits of tx options, Instructions for management, Patient and family education, Importance of tx  compliance, Risk factor reductions, Impressions, Counseling / Coordination of care, Documenting in the medical record, Reviewing/placing orders in the medical record (including tests, medications, and/or procedures), Obtaining or reviewing history  , and Communicating with other healthcare professionals .    ** Please Note: This note has been constructed using a voice recognition system. **     5

## 2025-05-18 NOTE — ASSESSMENT & PLAN NOTE
During recent admission patient having significant confabulations and thought to have Warnicke's encephalopathy and was started on high-dose thiamine.  Workup at that time was negative.  MRI showed no acute pathology neurology was consulted and thought to be unlikely to be infectious his nature and was self-medicating with alcohol.  Psychiatry was consulted and recommended no inpatient treatment.  Neuropsych was consulted and was determined to have capacity to make own decisions    CTH showing no acute pathology    Patient continues to have confabulations.  Will continue high-dose thiamine at this time multivitamin regimen and CIWA protocol  Delirium precautions

## 2025-05-18 NOTE — ASSESSMENT & PLAN NOTE
Patient presenting due to subjective altered mental status potential UTI and suppose it aggression towards family    Patient with a recent admission on 4/4/20/2025 to 4/8/2025 for encephalopathy thought to be due to Warnicke's encephalopathy and was on high-dose thiamine.    Workup in the ED mostly negative with a slightly elevated TSH.->  Patient to f/u with giuliano nobles referral on discharge    Patient noted to have hyponatremia of 129  Placed on fluid restriction of 1500  Hyponatremic workup

## 2025-05-18 NOTE — ED PROVIDER NOTES
Time reflects when diagnosis was documented in both MDM as applicable and the Disposition within this note       Time User Action Codes Description Comment    5/18/2025 10:31 AM Liz Gonzalez [E87.1] Hyponatremia     5/18/2025 10:31 AM Liz Gonzalez [E83.42] Hypomagnesemia     5/18/2025 11:58 AM Liz Gonzalez Add [R41.82] Altered mental status           ED Disposition       ED Disposition   Admit    Condition   Stable    Date/Time   Sun May 18, 2025 12:03 PM    Comment   Case was discussed with RAFAEL and the patient's admission status was agreed to be Admission Status: inpatient status to the service of Dr. Cotto .               Assessment & Plan       Medical Decision Making  59 year old female presents for evaluation of altered mental status with reported seizure-like episodes over the past week.  On review of EMR, patient had been admitted for similar mental status change last month with concern for Wernicke's encephalopathy which was treated with iv thiamine.  Thiamine and folate given.  Labs significant for hypomagnesemia and hyponatremia.  2 g IV magnesium and 1 L NS given.  UA is not consistent with UTI.  Afebrile.  Procal negative.  CTH negative for ICH or space occupying lesion.  Patient admitted for further evaluation and management.    Amount and/or Complexity of Data Reviewed  Labs: ordered. Decision-making details documented in ED Course.  Radiology: ordered.    Risk  Prescription drug management.  Decision regarding hospitalization.        ED Course as of 05/18/25 1203   Sun May 18, 2025   1028 Sodium(!): 129   1031 MAGNESIUM(!): 1.7   1032 Procalcitonin: <0.05       Medications   magnesium sulfate 2 g/50 mL IVPB (premix) 2 g (2 g Intravenous New Bag 5/18/25 1119)   sodium chloride 0.9 % bolus 1,000 mL (1,000 mL Intravenous New Bag 5/18/25 1021)   folic acid 1 mg, thiamine (VITAMIN B1) 100 mg in sodium chloride 0.9 % 100 mL IV piggyback ( Intravenous Stopped 5/18/25 1115)        ED Risk Strat Scores                    No data recorded                            History of Present Illness       Chief Complaint   Patient presents with    Altered Mental Status     Pt to ED from home via EMS. EMS reports that pt became combative towards her son and daughter. EMS also states that pt isn't taking her medication.       Past Medical History:   Diagnosis Date    Alcoholism (HCC)     Depression     Hypothyroidism     PTSD (post-traumatic stress disorder)       Past Surgical History:   Procedure Laterality Date    COLOSTOMY      CYSTOSCOPY W/ URETERAL STENT PLACEMENT Left     EXPLORATORY LAPAROTOMY      SALPINGECTOMY Bilateral 02/2025      Family History   Problem Relation Age of Onset    Heart disease Mother     Stroke Mother     Hypertension Mother     Kidney disease Mother     No Known Problems Father     No Known Problems Sister     No Known Problems Brother       Social History[1]   E-Cigarette/Vaping    E-Cigarette Use Never User       E-Cigarette/Vaping Substances    Nicotine No     THC No     CBD No     Flavoring No     Other No     Unknown No       I have reviewed and agree with the history as documented.     59 year old female presents for evaluation of altered mental status.  Patient states she feels confused.  She is able to tell me the year and reports April for the month.  She states she thinks she has been having multiple grand mal seizures with 6 episodes this past week.  She reports that she had been taken off of seizure medications for several years and feels that she must have an infection as this is what typically triggers her seizures.  She denies any injury associated with these recent episodes.  Patient states she has history of alcoholism, but says that she has not had any alcohol in 3 months.        Altered Mental Status      Review of Systems        Objective       ED Triage Vitals [05/18/25 0936]   Temperature Pulse Blood Pressure Respirations SpO2 Patient Position -  Orthostatic VS   97.5 °F (36.4 °C) 78 132/78 18 97 % Lying      Temp Source Heart Rate Source BP Location FiO2 (%) Pain Score    Temporal Monitor Right arm -- No Pain      Vitals      Date and Time Temp Pulse SpO2 Resp BP Pain Score FACES Pain Rating User   05/18/25 1130 -- 73 96 % 21 125/77 -- -- RN   05/18/25 1100 -- 76 97 % 18 133/91 -- -- RN   05/18/25 1030 -- 72 97 % 21 122/82 -- -- RN   05/18/25 1000 -- 77 98 % 21 123/84 -- -- RN   05/18/25 0936 97.5 °F (36.4 °C) 78 97 % 18 132/78 No Pain -- RN            Physical Exam  Vitals and nursing note reviewed.   Constitutional:       Appearance: She is underweight.     Cardiovascular:      Rate and Rhythm: Normal rate and regular rhythm.      Pulses: Normal pulses.   Pulmonary:      Effort: Pulmonary effort is normal. No respiratory distress.   Abdominal:      General: There is no distension.      Palpations: Abdomen is soft.      Tenderness: There is no abdominal tenderness.      Comments: Ostomy pink with no surrounding erythema or induration     Neurological:      Mental Status: She is alert.         Results Reviewed       Procedure Component Value Units Date/Time    RBC Morphology Reflex Test [[id number]] Collected: 05/18/25 0954    Lab Status: Final result Specimen: Blood from Arm, Left Updated: 05/18/25 1201    CBC and differential [[id number]]  (Normal) Collected: 05/18/25 0954    Lab Status: Final result Specimen: Blood from Arm, Left Updated: 05/18/25 1106     WBC 7.10 Thousand/uL      RBC 4.69 Million/uL      Hemoglobin 14.8 g/dL      Hematocrit 43.4 %      MCV 93 fL      MCH 31.6 pg      MCHC 34.1 g/dL      RDW 12.7 %      MPV 9.7 fL      Platelets 321 Thousands/uL     Narrative:      This is an appended report.  These results have been appended to a previously verified report.    Manual Differential(PHLEBS Do Not Order) [[id number]]  (Abnormal) Collected: 05/18/25 0954    Lab Status: Final result Specimen: Blood from Arm, Left Updated: 05/18/25 1106      Segmented % 39 %      Lymphocytes % 38 %      Monocytes % 11 %      Eosinophils % 7 %      Basophils % 0 %      Atypical Lymphocytes % 5 %      Absolute Neutrophils 2.77 Thousand/uL      Absolute Lymphocytes 3.05 Thousand/uL      Absolute Monocytes 0.78 Thousand/uL      Absolute Eosinophils 0.50 Thousand/uL      Absolute Basophils 0.00 Thousand/uL      Total Counted --     RBC Morphology Normal     Platelet Estimate Adequate    TSH, 3rd generation with Free T4 reflex [[id number]]  (Abnormal) Collected: 05/18/25 0954    Lab Status: Final result Specimen: Blood from Arm, Left Updated: 05/18/25 1038     TSH 3RD GENERATON 4.738 uIU/mL     T4, free [[id number]] Collected: 05/18/25 0954    Lab Status: In process Specimen: Blood from Arm, Left Updated: 05/18/25 1038    Urine Microscopic [[id number]]  (Normal) Collected: 05/18/25 1009    Lab Status: Final result Specimen: Urine, Straight Cath Updated: 05/18/25 1036     RBC, UA 0-1 /hpf      WBC, UA 1-2 /hpf      Epithelial Cells Occasional /hpf      Bacteria, UA Occasional /hpf     Procalcitonin [[id number]]  (Normal) Collected: 05/18/25 0954    Lab Status: Final result Specimen: Blood from Arm, Left Updated: 05/18/25 1032     Procalcitonin <0.05 ng/ml     HS Troponin 0hr (reflex protocol) [[id number]]  (Normal) Collected: 05/18/25 0954    Lab Status: Final result Specimen: Blood from Arm, Left Updated: 05/18/25 1030     hs TnI 0hr <2 ng/L     Rapid drug screen, urine [[id number]]  (Normal) Collected: 05/18/25 1009    Lab Status: Final result Specimen: Urine, Catheter Updated: 05/18/25 1028     Amph/Meth UR Negative     Barbiturate Ur Negative     Benzodiazepine Urine Negative     Cocaine Urine Negative     Methadone Urine Negative     Opiate Urine Negative     PCP Ur Negative     THC Urine Negative     Oxycodone Urine Negative     Fentanyl Urine Negative     HYDROCODONE URINE Negative    Narrative:      FOR MEDICAL PURPOSES ONLY.   IF CONFIRMATION NEEDED PLEASE CONTACT THE LAB  WITHIN 5 DAYS.    Drug Screen Cutoff Levels:  AMPHETAMINE/METHAMPHETAMINES  1000 ng/mL  BARBITURATES     200 ng/mL  BENZODIAZEPINES     200 ng/mL  COCAINE      300 ng/mL  METHADONE      300 ng/mL  OPIATES      300 ng/mL  PHENCYCLIDINE     25 ng/mL  THC       50 ng/mL  OXYCODONE      100 ng/mL  FENTANYL      5 ng/mL  HYDROCODONE     300 ng/mL    Comprehensive metabolic panel [[id number]]  (Abnormal) Collected: 05/18/25 0954    Lab Status: Final result Specimen: Blood from Arm, Left Updated: 05/18/25 1027     Sodium 129 mmol/L      Potassium 4.3 mmol/L      Chloride 89 mmol/L      CO2 32 mmol/L      ANION GAP 8 mmol/L      BUN 8 mg/dL      Creatinine 0.47 mg/dL      Glucose 116 mg/dL      Calcium 9.7 mg/dL      AST 13 U/L      ALT 11 U/L      Alkaline Phosphatase 80 U/L      Total Protein 7.9 g/dL      Albumin 4.2 g/dL      Total Bilirubin 0.37 mg/dL      eGFR 107 ml/min/1.73sq m     Narrative:      [employer] guidelines for Chronic Kidney Disease (CKD):     Stage 1 with normal or high GFR (GFR > 90 mL/min/1.73 square meters)    Stage 2 Mild CKD (GFR = 60-89 mL/min/1.73 square meters)    Stage 3A Moderate CKD (GFR = 45-59 mL/min/1.73 square meters)    Stage 3B Moderate CKD (GFR = 30-44 mL/min/1.73 square meters)    Stage 4 Severe CKD (GFR = 15-29 mL/min/1.73 square meters)    Stage 5 End Stage CKD (GFR <15 mL/min/1.73 square meters)  Note: GFR calculation is accurate only with a steady state creatinine    Magnesium [[id number]]  (Abnormal) Collected: 05/18/25 0954    Lab Status: Final result Specimen: Blood from Arm, Left Updated: 05/18/25 1027     Magnesium 1.7 mg/dL     Phosphorus [[id number]]  (Normal) Collected: 05/18/25 0954    Lab Status: Final result Specimen: Blood from Arm, Left Updated: 05/18/25 1027     Phosphorus 4.0 mg/dL     Salicylate level [[id number]]  (Abnormal) Collected: 05/18/25 0954    Lab Status: Final result Specimen: Blood from Arm, Left Updated: 05/18/25 1027      Salicylate Lvl <5 mg/dL     Acetaminophen level-If concentration is detectable, please discuss with medical  on call. [[id number]]  (Abnormal) Collected: 05/18/25 0954    Lab Status: Final result Specimen: Blood from Arm, Left Updated: 05/18/25 1027     Acetaminophen Level <2 ug/mL     Lactic acid, plasma (w/reflex if result > 2.0) [[id number]]  (Normal) Collected: 05/18/25 0954    Lab Status: Final result Specimen: Blood from Arm, Left Updated: 05/18/25 1025     LACTIC ACID 1.4 mmol/L     Narrative:      Result may be elevated if tourniquet was used during collection.    Ethanol [[id number]]  (Normal) Collected: 05/18/25 0954    Lab Status: Final result Specimen: Blood from Arm, Left Updated: 05/18/25 1025     Ethanol Lvl <10 mg/dL     UA w Reflex to Microscopic w Reflex to Culture [[id number]]  (Abnormal) Collected: 05/18/25 1009    Lab Status: Final result Specimen: Urine, Straight Cath Updated: 05/18/25 1023     Color, UA Yellow     Clarity, UA Cloudy     Specific Gravity, UA 1.015     pH, UA 7.5     Leukocytes, UA Small     Nitrite, UA Negative     Protein, UA Negative mg/dl      Glucose, UA Negative mg/dl      Ketones, UA Negative mg/dl      Urobilinogen, UA <2.0 mg/dl      Bilirubin, UA Negative     Occult Blood, UA Negative            CT head without contrast   Final Interpretation by Henri Dobson DO (05/18 1128)      No acute intracranial abnormality.                  Workstation performed: [id number]             ECG 12 Lead Documentation Only    Date/Time: 5/18/2025 10:21 AM    Performed by: Liz Gonzalez MD  Authorized by: Liz Gonzalez MD    Indications / Diagnosis:  Ams  ECG reviewed by me, the ED Provider: yes    Patient location:  ED  Previous ECG:     Previous ECG:  Compared to current    Comparison ECG info:  5/12/25 sinus tachycardia with right axis deviation    Similarity:  No change  Interpretation:     Interpretation: abnormal    Rate:     ECG rate:   71    ECG rate assessment: normal    Rhythm:     Rhythm: sinus rhythm    Ectopy:     Ectopy: none    QRS:     QRS axis:  Right    QRS intervals:  Normal  Conduction:     Conduction: normal    ST segments:     ST segments:  Normal  T waves:     T waves: inverted      Inverted:  III      ED Medication and Procedure Management   Prior to Admission Medications   Prescriptions Last Dose Informant Patient Reported? Taking?   Fluticasone-Salmeterol (Advair Diskus) 100-50 mcg/dose inhaler   No No   Sig: Inhale 1 puff 2 (two) times a day Rinse mouth after use.   Ostomy Supplies (Premier Colostomy/Ileostomy) KIT  Self, Child No No   Sig: by Does not apply route daily   Patient not taking: Reported on 5/9/2025   PARoxetine (PAXIL) 10 mg tablet  Self, Child No No   Sig: Take 1 tablet (10 mg total) by mouth daily   Patient not taking: Reported on 5/6/2025   Thiamine HCl (vitamin B-1) 250 MG tablet  Self, Child Yes No   Sig: Take 250 mg by mouth daily   docusate sodium (COLACE) 100 mg capsule  Self, Child No No   Sig: Take 1 capsule (100 mg total) by mouth every 12 (twelve) hours   Patient not taking: Reported on 5/6/2025      Facility-Administered Medications: None     Patient's Medications   Discharge Prescriptions    No medications on file     No discharge procedures on file.  ED SEPSIS DOCUMENTATION   Time reflects when diagnosis was documented in both MDM as applicable and the Disposition within this note       Time User Action Codes Description Comment    5/18/2025 10:31 AM Liz Gonzalez [E87.1] Hyponatremia     5/18/2025 10:31 AM Liz Gonzalez [E83.42] Hypomagnesemia     5/18/2025 11:58 AM Liz Gonzalez Add [R41.82] Altered mental status                      [1]   Social History  Tobacco Use    Smoking status: Every Day     Types: Cigarettes    Smokeless tobacco: Current   Vaping Use    Vaping status: Never Used   Substance Use Topics    Alcohol use: Yes     Comment: drinks beer    Drug use: Not  Currently     Comment: Denies use        Liz Gonzalez MD  05/18/25 [id number]

## 2025-05-18 NOTE — NURSING NOTE
Patient refusing blood work at this time. Will re attempt when patient is less agitated. On call provider made aware

## 2025-05-18 NOTE — ASSESSMENT & PLAN NOTE
Malnutrition Findings:                                 BMI Findings:           Body mass index is 13.16 kg/m².

## 2025-05-19 PROBLEM — F31.12 BIPOLAR 1 DISORDER WITH MODERATE MANIA (HCC): Status: ACTIVE | Noted: 2025-05-19

## 2025-05-19 PROBLEM — Z91.89 AT HIGH RISK FOR ELECTROLYTE IMBALANCE: Status: ACTIVE | Noted: 2025-05-19

## 2025-05-19 PROBLEM — E83.42 HYPOMAGNESEMIA: Status: ACTIVE | Noted: 2025-05-19

## 2025-05-19 LAB
ALBUMIN SERPL BCG-MCNC: 3.4 G/DL (ref 3.5–5)
ALP SERPL-CCNC: 60 U/L (ref 34–104)
ALT SERPL W P-5'-P-CCNC: 9 U/L (ref 7–52)
ANION GAP SERPL CALCULATED.3IONS-SCNC: 7 MMOL/L (ref 4–13)
AST SERPL W P-5'-P-CCNC: 10 U/L (ref 13–39)
BILIRUB SERPL-MCNC: 0.3 MG/DL (ref 0.2–1)
BUN SERPL-MCNC: 5 MG/DL (ref 5–25)
CALCIUM ALBUM COR SERPL-MCNC: 9.2 MG/DL (ref 8.3–10.1)
CALCIUM SERPL-MCNC: 8.7 MG/DL (ref 8.4–10.2)
CHLORIDE SERPL-SCNC: 96 MMOL/L (ref 96–108)
CO2 SERPL-SCNC: 26 MMOL/L (ref 21–32)
CORTIS AM PEAK SERPL-MCNC: 7.9 UG/DL (ref 6.7–22.6)
CREAT SERPL-MCNC: 0.42 MG/DL (ref 0.6–1.3)
ERYTHROCYTE [DISTWIDTH] IN BLOOD BY AUTOMATED COUNT: 12.7 % (ref 11.6–15.1)
GFR SERPL CREATININE-BSD FRML MDRD: 111 ML/MIN/1.73SQ M
GLUCOSE SERPL-MCNC: 163 MG/DL (ref 65–140)
HCT VFR BLD AUTO: 40 % (ref 34.8–46.1)
HGB BLD-MCNC: 13.6 G/DL (ref 11.5–15.4)
MAGNESIUM SERPL-MCNC: 1.7 MG/DL (ref 1.9–2.7)
MCH RBC QN AUTO: 31.4 PG (ref 26.8–34.3)
MCHC RBC AUTO-ENTMCNC: 34 G/DL (ref 31.4–37.4)
MCV RBC AUTO: 92 FL (ref 82–98)
OSMOLALITY UR/SERPL-RTO: 275 MMOL/KG (ref 282–298)
PHOSPHATE SERPL-MCNC: 3.7 MG/DL (ref 2.3–4.1)
PLATELET # BLD AUTO: 276 THOUSANDS/UL (ref 149–390)
PMV BLD AUTO: 9.3 FL (ref 8.9–12.7)
POTASSIUM SERPL-SCNC: 3.9 MMOL/L (ref 3.5–5.3)
PROT SERPL-MCNC: 6.3 G/DL (ref 6.4–8.4)
RBC # BLD AUTO: 4.33 MILLION/UL (ref 3.81–5.12)
SODIUM SERPL-SCNC: 129 MMOL/L (ref 135–147)
WBC # BLD AUTO: 6.05 THOUSAND/UL (ref 4.31–10.16)

## 2025-05-19 PROCEDURE — 99255 IP/OBS CONSLTJ NEW/EST HI 80: CPT | Performed by: INTERNAL MEDICINE

## 2025-05-19 PROCEDURE — 83930 ASSAY OF BLOOD OSMOLALITY: CPT | Performed by: STUDENT IN AN ORGANIZED HEALTH CARE EDUCATION/TRAINING PROGRAM

## 2025-05-19 PROCEDURE — 99232 SBSQ HOSP IP/OBS MODERATE 35: CPT

## 2025-05-19 PROCEDURE — 84100 ASSAY OF PHOSPHORUS: CPT | Performed by: INTERNAL MEDICINE

## 2025-05-19 PROCEDURE — 99222 1ST HOSP IP/OBS MODERATE 55: CPT | Performed by: PSYCHIATRY & NEUROLOGY

## 2025-05-19 PROCEDURE — 85027 COMPLETE CBC AUTOMATED: CPT | Performed by: INTERNAL MEDICINE

## 2025-05-19 PROCEDURE — 83735 ASSAY OF MAGNESIUM: CPT | Performed by: INTERNAL MEDICINE

## 2025-05-19 PROCEDURE — 99255 IP/OBS CONSLTJ NEW/EST HI 80: CPT | Performed by: STUDENT IN AN ORGANIZED HEALTH CARE EDUCATION/TRAINING PROGRAM

## 2025-05-19 PROCEDURE — 82533 TOTAL CORTISOL: CPT | Performed by: STUDENT IN AN ORGANIZED HEALTH CARE EDUCATION/TRAINING PROGRAM

## 2025-05-19 PROCEDURE — 80053 COMPREHEN METABOLIC PANEL: CPT | Performed by: INTERNAL MEDICINE

## 2025-05-19 RX ORDER — OLANZAPINE 10 MG/2ML
2.5 INJECTION, POWDER, FOR SOLUTION INTRAMUSCULAR EVERY 6 HOURS PRN
Status: DISCONTINUED | OUTPATIENT
Start: 2025-05-19 | End: 2025-05-19

## 2025-05-19 RX ORDER — SODIUM CHLORIDE 1 G/1
1 TABLET ORAL
Status: DISCONTINUED | OUTPATIENT
Start: 2025-05-19 | End: 2025-05-19

## 2025-05-19 RX ORDER — OLANZAPINE 5 MG/1
5 TABLET, FILM COATED ORAL EVERY 6 HOURS PRN
Status: DISCONTINUED | OUTPATIENT
Start: 2025-05-19 | End: 2025-05-19

## 2025-05-19 RX ORDER — OLANZAPINE 10 MG/2ML
2.5 INJECTION, POWDER, FOR SOLUTION INTRAMUSCULAR EVERY 6 HOURS PRN
Status: DISCONTINUED | OUTPATIENT
Start: 2025-05-19 | End: 2025-05-20

## 2025-05-19 RX ORDER — OLANZAPINE 5 MG/1
2.5 TABLET, FILM COATED ORAL EVERY 6 HOURS PRN
Status: DISCONTINUED | OUTPATIENT
Start: 2025-05-19 | End: 2025-05-20

## 2025-05-19 RX ORDER — MAGNESIUM SULFATE HEPTAHYDRATE 40 MG/ML
2 INJECTION, SOLUTION INTRAVENOUS ONCE
Status: COMPLETED | OUTPATIENT
Start: 2025-05-19 | End: 2025-05-19

## 2025-05-19 RX ADMIN — Medication 3 MG: at 21:21

## 2025-05-19 RX ADMIN — FLUTICASONE FUROATE AND VILANTEROL TRIFENATATE 1 PUFF: 100; 25 POWDER RESPIRATORY (INHALATION) at 12:08

## 2025-05-19 RX ADMIN — MULTIPLE VITAMINS W/ MINERALS TAB 1 TABLET: TAB ORAL at 12:08

## 2025-05-19 RX ADMIN — MAGNESIUM SULFATE HEPTAHYDRATE 2 G: 40 INJECTION, SOLUTION INTRAVENOUS at 13:16

## 2025-05-19 RX ADMIN — Medication 7.5 MG: at 16:09

## 2025-05-19 RX ADMIN — THIAMINE HYDROCHLORIDE 500 MG: 100 INJECTION, SOLUTION INTRAMUSCULAR; INTRAVENOUS at 12:18

## 2025-05-19 RX ADMIN — THIAMINE HYDROCHLORIDE 500 MG: 100 INJECTION, SOLUTION INTRAMUSCULAR; INTRAVENOUS at 21:23

## 2025-05-19 RX ADMIN — THIAMINE HYDROCHLORIDE 500 MG: 100 INJECTION, SOLUTION INTRAMUSCULAR; INTRAVENOUS at 16:56

## 2025-05-19 RX ADMIN — FOLIC ACID 1 MG: 1 TABLET ORAL at 12:08

## 2025-05-19 NOTE — CONSULTS
NEPHROLOGY HOSPITAL CONSULTATION   Berta Smart 59 y.o. female MRN: [id number]  Unit/Bed#: -01 Encounter: [id number]    Assessment & Plan  Hyponatremia  Current sodium 129, upon admission sodium 129  Per [facility] records, appears to have intermittent history of hyponatremia since 2015.  While in ER received 1L NS bolus without improvement  Pt noted to be on Paxil via home medications  History of alcoholism - did not answer as to what her last drink was.   Currently on 1.5L FR/24hr  Workup:   Serum Osmo: 275, Urine Osmo: 343, Urine Na:TBC  Uric Acid: normal, AM Cortisol: normal, TSH: 4.738  Hyponatremia secondary of SSRI as well as alcohol use. Would not rule out possible change in appetite/poor oral intake.   Continue on 1.5L FR/24HR  Start on 1g salt tabs three times a day with meals  Monitor sodium. Avoid overcorrection. Labs in AM.   At high risk for electrolyte imbalance  Sodium-see above  Magnesium 1.7-2 g IV magnesium replacement ordered  Phosphorus and potassium stable  Monitor electrolytes replete as necessary  Alcoholism (HCC)  On CIWA  Per primary team  Encephalopathy  Psychiatric consulted  CT head without: Negative  Recently hospitalized 4/4-4/8 for encephalopathy.         HISTORY OF PRESENT ILLNESS:  Requesting Physician: Jadon Kaplan MD  Reason for Consult: Hyponatremia    Berta Smart is a 59 y.o. female who was admitted to [facility] ER after presenting via EMS from home. Patient became combative towards son and daughter.  Per EMS, patient has not been taking her medications. A renal consultation is requested today for assistance in the management of hyponatremia. Pt seen at bedside,  present. Pt states she takes her home medications as prescribed. When talking to patient, she frequently gets off topic and does not answer questions being asked to her.  also unable to answer questions. Unable to find out if patient had any change in appetite or fluid intake as she did not actually  answer the question.     PAST MEDICAL HISTORY:  Past Medical History:   Diagnosis Date    Alcoholism (HCC)     Depression     Hypothyroidism     PTSD (post-traumatic stress disorder)        PAST SURGICAL HISTORY:  Past Surgical History:   Procedure Laterality Date    COLOSTOMY      CYSTOSCOPY W/ URETERAL STENT PLACEMENT Left     EXPLORATORY LAPAROTOMY      SALPINGECTOMY Bilateral 02/2025       ALLERGIES:  Allergies[1]    SOCIAL HISTORY:  Social History     Substance and Sexual Activity   Alcohol Use Yes    Comment: drinks beer     Social History     Substance and Sexual Activity   Drug Use Not Currently    Comment: Denies use     Tobacco Use History[2]    FAMILY HISTORY:  Family History   Problem Relation Age of Onset    Heart disease Mother     Stroke Mother     Hypertension Mother     Kidney disease Mother     No Known Problems Father     No Known Problems Sister     No Known Problems Brother        MEDICATIONS:  Current Medications[3]    REVIEW OF SYSTEMS:  Constitutional: Negative for fatigue, anorexia, fever, chills, diaphoresis  HENT: Negative for postnasal drip  Eyes: Negative for visual disturbance.   Respiratory: Negative for cough, shortness of breath and wheezing.   Cardiovascular: Negative for chest pain, palpitations and leg swelling.   Gastrointestinal: Negative for abdominal pain, constipation, diarrhea, nausea and vomiting.   Genitourinary: No dysuria, hematuria  Endocrine: Negative for polyuria.   Musculoskeletal: Negative for arthralgias, back pain and joint swelling.   Skin: Negative for rash.   Neurological: Negative for focal weakness, headaches, dizziness.  Hematological: Negative for easy bruising or bleeding.  Psychiatric/Behavioral: Negative for confusion and sleep disturbance.   All the systems were reviewed and were negative except as documented on the HPI.    PHYSICAL EXAM:  Current Weight: Weight - Scale: 38.1 kg (83 lb 15.9 oz)  First Weight: Weight - Scale: 38.1 kg (83 lb 15.9  oz)  Vitals:    05/19/25 0150 05/19/25 0623 05/19/25 0753 05/19/25 0755   BP: 110/68 110/74 97/77 107/75   Pulse: 80 85 87 94   Resp:       Temp:   99.3 °F (37.4 °C) 99.3 °F (37.4 °C)   TempSrc:       SpO2: 95% 92% 93% 91%   Weight:       Height:           Intake/Output Summary (Last 24 hours) at 5/19/2025 1406  Last data filed at 5/19/2025 0401  Gross per 24 hour   Intake 221 ml   Output 1855 ml   Net -1634 ml     Physical Exam  Vitals and nursing note reviewed. Exam conducted with a chaperone present ( and staff nurse at bedside.).   Constitutional:       Appearance: She is ill-appearing and toxic-appearing.     Cardiovascular:      Rate and Rhythm: Normal rate.      Pulses: Normal pulses.   Pulmonary:      Effort: Pulmonary effort is normal. No respiratory distress.      Breath sounds: Normal breath sounds. No wheezing or rales.   Abdominal:      General: Abdomen is flat. There is no distension.      Palpations: Abdomen is soft.      Tenderness: There is no abdominal tenderness.      Comments: Ostomy noted     Musculoskeletal:      Right lower leg: No edema.      Left lower leg: No edema.     Skin:     General: Skin is warm and dry.      Capillary Refill: Capillary refill takes less than 2 seconds.      Coloration: Skin is pale.     Neurological:      Mental Status: She is alert.     Psychiatric:         Mood and Affect: Mood is anxious. Affect is angry and inappropriate.         Thought Content: Thought content is paranoid.           Invasive Devices:      Lab Results:   Results from last 7 days   Lab Units 05/19/25  0719 05/18/25  0954 05/12/25  1528   WBC Thousand/uL 6.05 7.10 7.97   HEMOGLOBIN g/dL 13.6 14.8 14.8   HEMATOCRIT % 40.0 43.4 44.5   PLATELETS Thousands/uL 276 321 248   POTASSIUM mmol/L 3.9 4.3 3.9   CHLORIDE mmol/L 96 89* 97   CO2 mmol/L 26 32 27   BUN mg/dL 5 8 9   CREATININE mg/dL 0.42* 0.47* 0.52*   CALCIUM mg/dL 8.7 9.7 9.3   MAGNESIUM mg/dL 1.7* 1.7*  --    PHOSPHORUS mg/dL 3.7 4.0  --  "   ALK PHOS U/L 60 80 68   ALT U/L 9 11 15   AST U/L 10* 13 14     Other Studies:     Portions of the record may have been created with voice recognition software. Occasional wrong word or \"sound a like\" substitutions may have occurred due to the inherent limitations of voice recognition software. Read the chart carefully and recognize, using context, where substitutions Upon admission 129have occurred.If you have any questions, please contact the dictating provider.         [1] No Known Allergies  [2]   Social History  Tobacco Use   Smoking Status Every Day    Types: Cigarettes   Smokeless Tobacco Current   [3]   Current Facility-Administered Medications:     acetaminophen (TYLENOL) tablet 650 mg, 650 mg, Oral, Q6H PRN, Swetha Baker MD    aluminum-magnesium hydroxide-simethicone (MAALOX) oral suspension 30 mL, 30 mL, Oral, Q6H PRN, Swetha Baker MD    Fluticasone Furoate-Vilanterol 100-25 mcg/actuation 1 puff, 1 puff, Inhalation, Daily, Swetha Baker MD, 1 puff at 05/19/25 1208    folic acid (FOLVITE) tablet 1 mg, 1 mg, Oral, Daily, Swetha Baker MD, 1 mg at 05/19/25 1208    magnesium sulfate 2 g/50 mL IVPB (premix) 2 g, 2 g, Intravenous, Once, SUZY Briscoe, Last Rate: 25 mL/hr at 05/19/25 1316, 2 g at 05/19/25 1316    melatonin tablet 3 mg, 3 mg, Oral, HS, Swetha Baker MD, 3 mg at 05/18/25 2112    multivitamin-minerals (CENTRUM) tablet 1 tablet, 1 tablet, Oral, Daily, Swetha Baker MD, 1 tablet at 05/19/25 1208    ondansetron (ZOFRAN) injection 4 mg, 4 mg, Intravenous, Q4H PRN, Swetha Baker MD    polyethylene glycol (MIRALAX) packet 17 g, 17 g, Oral, Daily PRN, Swetha Baker MD    thiamine (VITAMIN B1) 500 mg in sodium chloride 0.9 % 50 mL IVPB, 500 mg, Intravenous, TID, Last Rate: 100 mL/hr at 05/19/25 1218, 500 mg at 05/19/25 1218 **FOLLOWED BY** [START ON 5/21/2025] thiamine (VITAMIN B1) 250 mg in sodium chloride 0.9 % 50 mL IVPB, 250 mg, Intravenous, Daily **FOLLOWED BY** " [DISCONTINUED] thiamine (VITAMIN B1) 100 mg in sodium chloride 0.9 % 50 mL IVPB, 100 mg, Intravenous, TID **FOLLOWED BY** [DISCONTINUED] thiamine (VITAMIN B1) 100 mg in sodium chloride 0.9 % 50 mL IVPB, 100 mg, Intravenous, Daily, Swetha Baker MD

## 2025-05-19 NOTE — ASSESSMENT & PLAN NOTE
- ethanol <10 on presentation   - alcohol free x 3 months per chart review  - encourage continued cessation of alcohol use   - continue thiamine supplementation

## 2025-05-19 NOTE — UTILIZATION REVIEW
NOTIFICATION OF INPATIENT ADMISSION   AUTHORIZATION REQUEST   SERVICING FACILITY:   [place] [address]  Tax ID: [id number]  NPI: [id number] ATTENDING PROVIDER:  Attending Name and NPI#: Jadon Kaplan Md [[id number]]  Address: [address] [place] [address]  Phone: [phone]   ADMISSION INFORMATION:  Place of Service: Inpatient [facility] ([place], [place])  Place of Service Code: 21  Inpatient Admission Date/Time: 5/18/25 12:03 PM  Discharge Date/Time: No discharge date for patient encounter.  Admitting Diagnosis Code/Description:  Hypomagnesemia [E83.42]  Hyponatremia [E87.1]  Altered mental status [R41.82]  AMS (altered mental status) [R41.82]     UTILIZATION REVIEW CONTACT:  Kyra Hernandez, Utilization   Network Utilization Review Department  Phone: [phone]  Fax: [phone]  Email: [email or web address]  Contact for approvals/pending authorizations, clinical reviews, and discharge.     PHYSICIAN ADVISORY SERVICES:  Medical Necessity Denial & Opvh-ve-[facility] Review  Phone: [phone]  Fax: [phone]  Email: [email or web address]     DISCHARGE SUPPORT TEAM:  For Patients Discharge Needs & Updates  Phone: [phone] opt. 2 Fax: [phone]  Email: [email or web address]

## 2025-05-19 NOTE — PROGRESS NOTES
Progress Note - Hospitalist   Name: Berta Smart 59 y.o. female I MRN: [id number]  Unit/Bed#: -01 I Date of Admission: 5/18/2025   Date of Service: 5/19/2025 I Hospital Day: 1    Assessment & Plan  Hyponatremia  Patient presented to ED due to family clinic patient with altered mental status and aggression towards family members.  Per chart review, it appears patient with recent mission on 4/4 to 4/8 for encephalopathy, where it was thought to be Warnicke's encephalopathy and on high-dose thiamine.      On admission, patient noted to have hyponatremia of 129.   osmolality, serum (275), osmolality, urine (343); sodium, urine (85.0)   Continue FR 1500 mL.    Will consult nephrology.  Continue to monitor sNa, daily.   Encephalopathy  Patient with recent admission, where she was found to have confabulations and thought to have Warnicke's encephalopathy, as patient self medicates with alcohol.  On prior admission, workup was negative.    Patient does not meet SIRS criteria.  CTH - negative   UDS - negative     Patient presenting still having extreme confabulations.  Will continue high-dose thiamine, folic acid, and multivitamin.  Continue CIWA protocol.  Continue delirium precautions.  Will consult psychiatry.  Will need petition for 302 for involuntary psychiatric hospitalization when medically cleared.  Will consult neurology.  Colostomy status (HCC)  S/P colon resection approx. 15 years ago.   Continue to monitor output.    Continue colostomy care.    Alcoholism (HCC)  Will continue CIWA protocol  Continue to encourage continued sensation.   Severe protein-calorie malnutrition (HCC)  Malnutrition Findings:      BMI Findings:     Body mass index is 13.16 kg/m².     Will consult nutrition during admission.  Continue encourage adequate oral hydration and nutrition.  Hypomagnesemia  Per chart review, patient with noted hypomagnesemia at 1.7.  Will replete with magnesium sulfate 2 g, once.  Will recheck magnesium  level in the AM.     VTE Pharmacologic Prophylaxis: VTE Score: 1 Low Risk (Score 0-2) - Encourage Ambulation.    Mobility:   Basic Mobility Inpatient Raw Score: 20  JH-HLM Goal: 6: Walk 10 steps or more  JH-HLM Achieved: 6: Walk 10 steps or more  JH-HLM Goal achieved. Continue to encourage appropriate mobility.    Patient Centered Rounds: I performed bedside rounds with nursing staff today.   Discussions with Specialists or Other Care Team Provider: None     Education and Discussions with Family / Patient: Patient declined call to .     Current Length of Stay: 1 day(s)  Current Patient Status: Inpatient   Certification Statement: The patient will continue to require additional inpatient hospital stay due to continued auditory/visual hallucinations, behavioral issues, and pending improvement.  Discharge Plan: Anticipate discharge in 48-72 hrs to home versus IP psych pending above consultations.    Code Status: Level 1 - Full Code    Subjective   The patient was seen and examined at the bedside this morning.  Upon entering the room, the patient has food items scattered all over the floor and sitting in the bed.  The patient expressing that she feels well this morning, but knows that she came to the hospital due to inadequate sodium/magnesium levels.  During conversation, patient expressing that she threw her breakfast against the wall, missing the TV.  The patient does express that having active auditory/visual hallucinations.  The patient is alert and oriented, but does express that she is wanting to go home today.    Per nursing, patient with behavioral issues overnight, refusing medications and throwing food.    Objective :  Temp:  [97.3 °F (36.3 °C)-99.3 °F (37.4 °C)] 99.3 °F (37.4 °C)  HR:  [78-94] 94  BP: ()/(68-96) 107/75  Resp:  [18-20] 19  SpO2:  [91 %-97 %] 91 %  O2 Device: None (Room air)    Body mass index is 13.16 kg/m².     Input and Output Summary (last 24 hours):     Intake/Output  Summary (Last 24 hours) at 5/19/2025 1143  Last data filed at 5/19/2025 0401  Gross per 24 hour   Intake 271 ml   Output 1855 ml   Net -1584 ml       Physical Exam  Vitals and nursing note reviewed.   Constitutional:       General: She is awake. She is not in acute distress.     Appearance: She is not ill-appearing.   HENT:      Head: Normocephalic and atraumatic.     Eyes:      General: No scleral icterus.     Conjunctiva/sclera: Conjunctivae normal.       Cardiovascular:      Rate and Rhythm: Normal rate and regular rhythm.      Heart sounds: Normal heart sounds, S1 normal and S2 normal. No murmur heard.  Pulmonary:      Effort: Pulmonary effort is normal.      Breath sounds: No decreased breath sounds, wheezing, rhonchi or rales.   Abdominal:      General: The ostomy site is clean. Bowel sounds are normal. There is no distension.      Palpations: Abdomen is soft.      Tenderness: There is no abdominal tenderness.      Comments: On exam, patient with left-sided colostomy.     Musculoskeletal:      Right lower leg: No edema.      Left lower leg: No edema.     Skin:     General: Skin is warm and dry.      Comments: On exam, no evidence of open lesions/wounds on exposed skin.     Neurological:      Mental Status: She is alert and oriented to person, place, and time.      Sensory: Sensation is intact.      Motor: Motor function is intact.     Psychiatric:         Attention and Perception: She perceives auditory and visual hallucinations.         Mood and Affect: Mood is anxious.         Speech: Speech is tangential.         Behavior: Behavior is actively hallucinating. Behavior is cooperative.         Judgment: Judgment is impulsive.       Lines/Drains:  Lines/Drains/Airways       Active Status       Name Placement date Placement time Site Days    Ileostomy LLQ 04/07/25  --  LLQ  42                      Lab Results: I have reviewed the following results:   Results from last 7 days   Lab Units 05/19/25  0719  05/18/25  0954 05/12/25  1528   WBC Thousand/uL 6.05 7.10 7.97   HEMOGLOBIN g/dL 13.6 14.8 14.8   HEMATOCRIT % 40.0 43.4 44.5   PLATELETS Thousands/uL 276 321 248   SEGS PCT %  --   --  82*   LYMPHO PCT %  --  38 8*   MONO PCT %  --  11 5   EOS PCT %  --  7* 5     Results from last 7 days   Lab Units 05/19/25  0719   SODIUM mmol/L 129*   POTASSIUM mmol/L 3.9   CHLORIDE mmol/L 96   CO2 mmol/L 26   BUN mg/dL 5   CREATININE mg/dL 0.42*   ANION GAP mmol/L 7   CALCIUM mg/dL 8.7   ALBUMIN g/dL 3.4*   TOTAL BILIRUBIN mg/dL 0.30   ALK PHOS U/L 60   ALT U/L 9   AST U/L 10*   GLUCOSE RANDOM mg/dL 163*                 Results from last 7 days   Lab Units 05/18/25  0954   LACTIC ACID mmol/L 1.4   PROCALCITONIN ng/ml <0.05       Recent Cultures (last 7 days):               Last 24 Hours Medication List:     Current Facility-Administered Medications:     acetaminophen (TYLENOL) tablet 650 mg, Q6H PRN    aluminum-magnesium hydroxide-simethicone (MAALOX) oral suspension 30 mL, Q6H PRN    Fluticasone Furoate-Vilanterol 100-25 mcg/actuation 1 puff, Daily    folic acid (FOLVITE) tablet 1 mg, Daily    magnesium sulfate 2 g/50 mL IVPB (premix) 2 g, Once    melatonin tablet 3 mg, HS    multivitamin-minerals (CENTRUM) tablet 1 tablet, Daily    ondansetron (ZOFRAN) injection 4 mg, Q4H PRN    polyethylene glycol (MIRALAX) packet 17 g, Daily PRN    thiamine (VITAMIN B1) 500 mg in sodium chloride 0.9 % 50 mL IVPB, TID, Last Rate: 500 mg (05/18/25 2112) **FOLLOWED BY** [START ON 5/21/2025] thiamine (VITAMIN B1) 250 mg in sodium chloride 0.9 % 50 mL IVPB, Daily **FOLLOWED BY** [DISCONTINUED] thiamine (VITAMIN B1) 100 mg in sodium chloride 0.9 % 50 mL IVPB, TID **FOLLOWED BY** [DISCONTINUED] thiamine (VITAMIN B1) 100 mg in sodium chloride 0.9 % 50 mL IVPB, Daily    Administrative Statements   Today, Patient Was Seen By: SUZY Briscoe      **Please Note: This note may have been constructed using a voice recognition system.**

## 2025-05-19 NOTE — ASSESSMENT & PLAN NOTE
--Patient meets criteria for current manic episode and requires inpatient psychiatric hospitalization for safety and stabilization otherwise her shahida is unlikely to remit, lacks capacity and thus we should petition 302 for involuntary psychiatric hospitalization  --Only once medically stable, that means if the patient were not going to inpatient psychiatric treatment she would be discharged home, recommend initiating 302 process and referral for inpatient behavioral health treatment.  Do not petition or uphold 302 until patient is medically stable  --Initiate Depakote 250 mg every 12 hours the patient is amenable  --If patient is exhibiting dangerous behaviors immediately putting patient or others at risk of harm, recommend Zyprexa 5 mg p.o. as needed if patient is able to tolerate or take p.o. medications or 5 mg IM as needed agitation if patient is unable to participate in p.o. medications

## 2025-05-19 NOTE — ASSESSMENT & PLAN NOTE
"59 y.o.  female with anxiety, depression, tobacco use, alcoholism, UC with colostomy in place, who presented to [facility] on 5/18/25 with altered mental status.    Workup:  - CTH wo contrast 5/18/25:  \"No acute intracranial abnormality. \"  - Labs  - Coma panel: Salicylates less than 5, acetaminophen less than 2, ethanol less than 10  -  sodium 129  - UA with small leukocytes, negative nitrites  - UDS negative   - TSH 4.738, free T4 1.06  - procalcitonin <0.05  - lactic acid 1.4     On exam, patient is tangential and with flight of ideas. No focal deficits noted. Per discussion with attending neurologist, patient presented with similar symptoms previously and had unremarkable neurologic workup. Low suspicion for neurologic etiology at this time. Stronger suspicion for psych etiology.    Plan:  - Psych following; appreciate recommendations  - No further neuroimaging needed at this time  - Monitor neuro exam; notify with any changes  - Medical management and supportive care per primary team. Correction of any metabolic or infectious disturbances.   - No further inpatient neurology recommendations at this time. Please call with any questions.  - Case and treatment plan reviewed with attending neurologist, Dr. Desir. Please see attending attestation for any further recommendations.  "

## 2025-05-19 NOTE — ASSESSMENT & PLAN NOTE
Current sodium 129, upon admission sodium 129  Per [facility] records, appears to have intermittent history of hyponatremia since 2015.  While in ER received 1L NS bolus without improvement  Pt noted to be on Paxil via home medications  History of alcoholism - did not answer as to what her last drink was.   Currently on 1.5L FR/24hr  Workup:   Serum Osmo: 275, Urine Osmo: 343, Urine Na:TBC  Uric Acid: normal, AM Cortisol: normal, TSH: 4.738  Hyponatremia secondary of SSRI as well as alcohol use. Would not rule out possible change in appetite/poor oral intake.   Continue on 1.5L FR/24HR  Start on 1g salt tabs three times a day with meals  Monitor sodium. Avoid overcorrection. Labs in AM.

## 2025-05-19 NOTE — CONSULTS
TELEConsultation - Behavioral Health   Berta Smart 59 y.o. female MRN: [id number]  Unit/Bed#: -01 Encounter: [id number]  Hospital Day: 1    VIRTUAL CARE DOCUMENTATION:     1. This service was provided via Telemedicine using: Teams Virtual Rounding      2. Parties in the room with patient during teleconsult: Patient only    3. Confidentiality: My office door was closed     4. Participants: No one else was in the room    5. Patient acknowledged consent and understanding of privacy and security of the  Telemedicine consult. I informed the patient that I have reviewed their record in Epic and presented the opportunity for them to ask any questions regarding the visit today.  The patient agreed to participate.    6. I have spent a total time of 60 minutes in caring for this patient on the day of the visit/encounter, NOT including the time spent for establishing the audio/video connection, but including Diagnostic results, Prognosis, Risks and benefits of tx options, Instructions for management, Patient and family education including obtaining collateral information, Importance of tx compliance, Risk factor reductions, Assessment & Impressions, Counseling / Coordination of care, Documenting in the medical record, Reviewing / ordering tests, medicine, and procedures  , Obtaining or reviewing history  , and Communicating with other healthcare professionals.       Assessment & Plan     Assessment: 59-year-old woman with psychiatric history of PTSD and alcohol use disorder presents to the hospital with altered mental status and aggressive behavior towards family.  Patient found to have UTI and altered mental status.  Patient was not taking medications for any psychiatric symptoms or disorders.  Seen by myself about a month ago was found to be encephalopathic but not meeting criteria for any mood or psychotic disorder.  At the time my interview now, the patient exhibits the signs and symptoms of manic episode in the  setting of bipolar 1 disorder including elevated expansive mood, irritability, flight of ideas, disorganized thought process, grandiosity, increased rate of speech.  Her symptoms are unlikely due to be encephalopathy as she is generally alert and oriented with intact attention and concentration on the time of my interview.  Despite this, her symptoms may improve as her medical condition stabilizes and if they dramatically improved, please reconsult psychiatry for reevaluation of disposition.  However at this time, based on my evaluation, once medically stable the patient requires inpatient psychiatric hospitalization for safety and stabilization.  Her manic symptoms are not unlikely to remit without medication treatment and the patient has poor insight into her condition precluding her from signing a 201 or participating in voluntary treatment.  I attempted to discuss inpatient psychiatric treatment with her and she reported that she is already in inpatient behavioral health treatment and was not receptive to redirection.  Once patient is medically stable, that means once the patient is medically stable to the point of she would be discharged home if she were not going to inpatient psychiatry, recommend 302 for involuntary psychiatric hospitalization.  As far as medications, recommend initiating Depakote 250 mg every 12 hours standing if patient is amenable to this medication.  When I offered medication she threatened me on my interview so I am skeptical that she will accept this medication.  If patient requires medication for agitation that is behaviors better immediately danger to self or others, recommend Zyprexa 5 mg either p.o. if patient is able to tolerate, or IM if patient is unable to tolerate p.o. medications.    Assessment & Plan  Encephalopathy  --Patient is alert and oriented with an intact attention and concentration, does not appear encephalopathic at this time  Bipolar 1 disorder with moderate shahida  "([facility])  --Patient meets criteria for current manic episode and requires inpatient psychiatric hospitalization for safety and stabilization otherwise her shahida is unlikely to remit, lacks capacity and thus we should petition 302 for involuntary psychiatric hospitalization  --Only once medically stable, that means if the patient were not going to inpatient psychiatric treatment she would be discharged home, recommend initiating 302 process and referral for inpatient behavioral health treatment.  Do not petition or uphold 302 until patient is medically stable  --Initiate Depakote 250 mg every 12 hours the patient is amenable  --If patient is exhibiting dangerous behaviors immediately putting patient or others at risk of harm, recommend Zyprexa 5 mg p.o. as needed if patient is able to tolerate or take p.o. medications or 5 mg IM as needed agitation if patient is unable to participate in p.o. medications       Diagnoses, available treatment options, alternatives to treatment, and risks vs. benefits of current psychiatric treatment plan were discussed with the patient.  Prior records were reviewed in [facility].  The case was discussed with the primary team.      History of Present Illness   Physician Requesting Consult: Jadon Kaplan MD    Chief Complaint: \"I am like a roller coaster\"    Reason for Consult / Principal Problem: Manic symptoms     Per H and P: Berta Smart is a 59 y.o. female with a PMH of encephalopathy confabulation Warnicke's encephalopathy alcohol use disorder in remission adjustment disorder severe protein calorie malnutrition colostomy status postresection urine retention who presents with subjective altered mental status with endorsement of being aggressive towards family.  Patient thought to have a UTI and therefore altered mental status.  EMS reports patient became combative towards family and that patient is not taking her medications although patient does not have any medications at baseline " "except for stool softeners.  Patient continues to confabulate and is a poor historian.  Patient noted to have a sodium level of 129 on admission.  And a slightly elevated TSH.  On initiate high-dose thiamine CIWA protocol and workup hyponatremia.  Patient placed on moderate fluid restriction of 1500 cc/day     Per primary team, patient presented with flight of ideas, auditory and visual hallucinations, and aggressive behaviors including throwing things at staff.  On last admission, patient did not meet criteria for Warnicke's encephalopathy but is currently receiving treatment for it.  Is being evaluated by neurology as well.    Psychiatry consulted for evaluation of manic behaviors.    On evaluation,    Patient was argumentative and only superficially cooperative with interview.  She reports she is in the hospital because she has lived here for the past several months.  Endorses poor sleep with significant mood lability reporting \"my mood is like a roller coaster\".  She is notably disorganized in thought process and exhibits significant flight of ideas jumping from 1 topic to the next without seeming transitions.  She is grandiose stating that she is a well-trained psychologist and could turn my interview around on me and have me working on myself.  She denies any current outpatient psychiatric treatment denies taking any psychotropic medications threatening to \"rip your ___off if you try\".  She reports she stopped drinking alcohol several months ago and since then her mood has been more labile.  Attempted to discuss the fact that she meets criteria for current manic episode and the need for inpatient behavioral health hospitalization.  She lacked insight into her condition and was unable to meaningfully discuss treatment stating \"I am already in psychiatric treatment right now\".  I attempted to redirect her and discussed that she is in medical treatment right now but she was unable to participate in this " discussion.  She denies any suicidal ideations, intentions, or plan.  Denies HI or AVH.  Her attention was intact during interview and she was alert and oriented.    Spoke with patient's  Bonilla with patient's permission.   reports that the patient has not been sleeping has been significantly irritable with labile mood for many years but worse since she stopped drinking alcohol several months ago.  He reports that he is unable to follow her train of thought and she is extremely disorganized at home.  We discussed the need for inpatient psychiatric hospitalization and he was amenable.    Psychiatric Review Of Systems:  Medication side effects: none  Sleep: 1 hour  Appetite: no change  Hygiene: able to tend to instrumental and basic ADLs  Anxiety Symptoms: denies  Psychotic Symptoms: denies  Depression Symptoms: denies  Manic Symptoms: endorses poor sleep, mood lability, irritability  PTSD Symptoms: denies  Suicidal Thoughts: denies  Homicidal Thoughts: denies    Historical Information   Psychiatric History:   Diagnoses: ptsd  Inpatient Hx: multiple  Outpatient Hx: none  Medications/Trials: none    Substance Abuse History:    Social History     Substance and Sexual Activity   Alcohol Use Yes    Comment: drinks beer     Social History     Substance and Sexual Activity   Drug Use Not Currently    Comment: Denies use       I discussed substance abuse with the patient and, if pertinent, discussed risks vs benefits of decreasing frequency of use.    Family History:   Family History   Problem Relation Age of Onset    Heart disease Mother     Stroke Mother     Hypertension Mother     Kidney disease Mother     No Known Problems Father     No Known Problems Sister     No Known Problems Brother        Social History  Rest of social history as per below:  Social History     Socioeconomic History    Marital status: /Civil Union     Spouse name: Not on file    Number of children: Not on file    Years of  education: Not on file    Highest education level: Not on file   Occupational History    Not on file   Tobacco Use    Smoking status: Every Day     Types: Cigarettes    Smokeless tobacco: Current   Vaping Use    Vaping status: Never Used   Substance and Sexual Activity    Alcohol use: Yes     Comment: drinks beer    Drug use: Not Currently     Comment: Denies use    Sexual activity: Not Currently   Other Topics Concern    Not on file   Social History Narrative    Not on file     Social Drivers of Health     Financial Resource Strain: Not on file   Food Insecurity: Patient Declined (5/18/2025)    Nursing - Inadequate Food Risk Classification     Worried About Running Out of Food in the Last Year: Not on file     Ran Out of Food in the Last Year: Not on file     Ran Out of Food in the Last Year: Patient declined   Recent Concern: Food Insecurity - Food Insecurity Present (4/4/2025)    Nursing - Inadequate Food Risk Classification     Worried About Running Out of Food in the Last Year: Not on file     Ran Out of Food in the Last Year: Not on file     Ran Out of Food in the Last Year: Sometimes true   Transportation Needs: Patient Declined (5/18/2025)    Nursing - Transportation Risk Classification     Lack of Transportation: Not on file     Lack of Transportation: Patient declined   Physical Activity: Inactive (7/30/2020)    Exercise Vital Sign     Days of Exercise per Week: 0 days     Minutes of Exercise per Session: 0 min   Stress: Stress Concern Present (7/30/2020)    [place] [employer] - Occupational Stress Questionnaire     Feeling of Stress : Very much   Social Connections: Moderately Isolated (7/30/2020)    Social Connection and Isolation Panel     Frequency of Communication with Friends and Family: More than three times a week     Frequency of Social Gatherings with Friends and Family: Never     Attends [employer] Services: Never     Active Member of Clubs or Organizations: No     Attends  Club or Organization Meetings: Never     Marital Status:    Intimate Partner Violence: Unknown (2025)    Nursing IPS     Feels Physically and Emotionally Safe: Not on file     Physically Hurt by Someone: Not on file     Humiliated or Emotionally Abused by Someone: Not on file     Physically Hurt by Someone: No     Hurt or Threatened by Someone: No   Housing Stability: Patient Declined (2025)    Nursing: Inadequate Housing Risk Classification     Has Housing: Not on file     Worried About Losing Housing: Not on file     Unable to Get Utilities: Not on file     Unable to Pay for Housing in the Last Year: Patient declined     Has Housin   Recent Concern: Housing Stability - At Risk (2025)    Nursing: Inadequate Housing Risk Classification     Has Housing: Not on file     Worried About Losing Housing: Not on file     Unable to Get Utilities: Not on file     Unable to Pay for Housing in the Last Year: Yes     Has Housin       Past Medical History:   Diagnosis Date    Alcoholism (HCC)     Depression     Hypothyroidism     PTSD (post-traumatic stress disorder)        Meds/Allergies   Allergies[1]    Current Facility-Administered Medications:     acetaminophen (TYLENOL) tablet 650 mg, Q6H PRN    aluminum-magnesium hydroxide-simethicone (MAALOX) oral suspension 30 mL, Q6H PRN    Fluticasone Furoate-Vilanterol 100-25 mcg/actuation 1 puff, Daily    folic acid (FOLVITE) tablet 1 mg, Daily    magnesium sulfate 2 g/50 mL IVPB (premix) 2 g, Once, Last Rate: 2 g (25 1316)    melatonin tablet 3 mg, HS    multivitamin-minerals (CENTRUM) tablet 1 tablet, Daily    ondansetron (ZOFRAN) injection 4 mg, Q4H PRN    polyethylene glycol (MIRALAX) packet 17 g, Daily PRN    sodium chloride tablet 1 g, TID With Meals    thiamine (VITAMIN B1) 500 mg in sodium chloride 0.9 % 50 mL IVPB, TID, Last Rate: 500 mg (25 1218) **FOLLOWED BY** [START ON 2025] thiamine (VITAMIN B1) 250 mg in sodium chloride  0.9 % 50 mL IVPB, Daily **FOLLOWED BY** [DISCONTINUED] thiamine (VITAMIN B1) 100 mg in sodium chloride 0.9 % 50 mL IVPB, TID **FOLLOWED BY** [DISCONTINUED] thiamine (VITAMIN B1) 100 mg in sodium chloride 0.9 % 50 mL IVPB, Daily    Current Medications:  Current medications as per above. All medications have been reviewed.   Risks, benefits, alternatives, and possible side effects of patient's psychiatric medications were discussed with patient.     Objective   Vital signs in last 24 hours:  Temp:  [97.7 °F (36.5 °C)-99.3 °F (37.4 °C)] 99.3 °F (37.4 °C)  HR:  [80-94] 94  BP: ()/(68-89) 107/75  SpO2:  [91 %-96 %] 91 %  O2 Device: None (Room air)    Mental Status Exam:  Appearance: disheveled, appears consistent with stated age  Motor: +psychomotor agitation, no gait abnormalities  Behavior: argumentative  Speech: pressured, volume is intermittently loud  Mood: irritable  Affect: expansive, labile, manic-appearing  Thought Process: disorganized, tangential, grandiose  Thought Content: denies auditory hallucinations, denies visual hallucinations, denies delusions  Risk Potential: denies suicidal ideation, plan, or intent. Denies homicidal ideation  Sensorium: Oriented to person, place, time, and situation  Cognition: cognitive ability appears impaired but was not quantitatively tested  Consciousness: alert and awake  Attention: currently intact  Insight: limited  Judgement: limited      Laboratory results:  I have personally reviewed all pertinent laboratory/tests results.  Recent Results (from the past 48 hours)   CBC and differential    Collection Time: 05/18/25  9:54 AM   Result Value Ref Range    WBC 7.10 4.31 - 10.16 Thousand/uL    RBC 4.69 3.81 - 5.12 Million/uL    Hemoglobin 14.8 11.5 - 15.4 g/dL    Hematocrit 43.4 34.8 - 46.1 %    MCV 93 82 - 98 fL    MCH 31.6 26.8 - 34.3 pg    MCHC 34.1 31.4 - 37.4 g/dL    RDW 12.7 11.6 - 15.1 %    MPV 9.7 8.9 - 12.7 fL    Platelets 321 149 - 390 Thousands/uL  "  Comprehensive metabolic panel    Collection Time: 05/18/25  9:54 AM   Result Value Ref Range    Sodium 129 (L) 135 - 147 mmol/L    Potassium 4.3 3.5 - 5.3 mmol/L    Chloride 89 (L) 96 - 108 mmol/L    CO2 32 21 - 32 mmol/L    ANION GAP 8 4 - 13 mmol/L    BUN 8 5 - 25 mg/dL    Creatinine 0.47 (L) 0.60 - 1.30 mg/dL    Glucose 116 65 - 140 mg/dL    Calcium 9.7 8.4 - 10.2 mg/dL    AST 13 13 - 39 U/L    ALT 11 7 - 52 U/L    Alkaline Phosphatase 80 34 - 104 U/L    Total Protein 7.9 6.4 - 8.4 g/dL    Albumin 4.2 3.5 - 5.0 g/dL    Total Bilirubin 0.37 0.20 - 1.00 mg/dL    eGFR 107 ml/min/1.73sq m   Lactic acid, plasma (w/reflex if result > 2.0)    Collection Time: 05/18/25  9:54 AM   Result Value Ref Range    LACTIC ACID 1.4 0.5 - 2.0 mmol/L   Procalcitonin    Collection Time: 05/18/25  9:54 AM   Result Value Ref Range    Procalcitonin <0.05 <=0.25 ng/ml   Magnesium    Collection Time: 05/18/25  9:54 AM   Result Value Ref Range    Magnesium 1.7 (L) 1.9 - 2.7 mg/dL   Phosphorus    Collection Time: 05/18/25  9:54 AM   Result Value Ref Range    Phosphorus 4.0 2.3 - 4.1 mg/dL   TSH, 3rd generation with Free T4 reflex    Collection Time: 05/18/25  9:54 AM   Result Value Ref Range    TSH 3RD GENERATON 4.738 (H) 0.450 - 4.500 uIU/mL   HS Troponin 0hr (reflex protocol)    Collection Time: 05/18/25  9:54 AM   Result Value Ref Range    hs TnI 0hr <2 \"Refer to ACS Flowchart\"- see link ng/L   Ethanol    Collection Time: 05/18/25  9:54 AM   Result Value Ref Range    Ethanol Lvl <10 <10 mg/dL   Salicylate level    Collection Time: 05/18/25  9:54 AM   Result Value Ref Range    Salicylate Lvl <5 (L) 5 - 20 mg/dL   Acetaminophen level-If concentration is detectable, please discuss with medical  on call.    Collection Time: 05/18/25  9:54 AM   Result Value Ref Range    Acetaminophen Level <2 (L) 10 - 20 ug/mL   T4, free    Collection Time: 05/18/25  9:54 AM   Result Value Ref Range    Free T4 1.06 0.61 - 1.12 ng/dL   Manual " Differential(PHLEBS Do Not Order)    Collection Time: 05/18/25  9:54 AM   Result Value Ref Range    Segmented % 39 (L) 43 - 75 %    Lymphocytes % 38 14 - 44 %    Monocytes % 11 4 - 12 %    Eosinophils % 7 (H) 0 - 6 %    Basophils % 0 0 - 1 %    Atypical Lymphocytes % 5 (H) <=0 %    Absolute Neutrophils 2.77 1.85 - 7.62 Thousand/uL    Absolute Lymphocytes 3.05 0.60 - 4.47 Thousand/uL    Absolute Monocytes 0.78 0.00 - 1.22 Thousand/uL    Absolute Eosinophils 0.50 (H) 0.00 - 0.40 Thousand/uL    Absolute Basophils 0.00 0.00 - 0.10 Thousand/uL    Total Counted      RBC Morphology Normal     Platelet Estimate Adequate Adequate   B-Type Natriuretic Peptide(BNP)    Collection Time: 05/18/25  9:54 AM   Result Value Ref Range    BNP 25 0 - 100 pg/mL   Uric acid    Collection Time: 05/18/25  9:54 AM   Result Value Ref Range    Uric Acid 2.9 2.0 - 7.5 mg/dL   ECG 12 lead    Collection Time: 05/18/25 10:08 AM   Result Value Ref Range    Ventricular Rate 83 BPM    Atrial Rate 83 BPM    PA Interval 142 ms    QRSD Interval 78 ms    QT Interval 378 ms    QTC Interval 444 ms    P Axis 48 degrees    QRS Axis 150 degrees    T Wave Axis 18 degrees   UA w Reflex to Microscopic w Reflex to Culture    Collection Time: 05/18/25 10:09 AM    Specimen: Urine, Straight Cath   Result Value Ref Range    Color, UA Yellow     Clarity, UA Cloudy     Specific [place], UA 1.015 1.005 - 1.030    pH, UA 7.5 4.5, 5.0, 5.5, 6.0, 6.5, 7.0, 7.5, 8.0    Leukocytes, UA Small (A) Negative    Nitrite, UA Negative Negative    Protein, UA Negative Negative mg/dl    Glucose, UA Negative Negative mg/dl    Ketones, UA Negative Negative mg/dl    Urobilinogen, UA <2.0 <2.0 mg/dl mg/dl    Bilirubin, UA Negative Negative    Occult Blood, UA Negative Negative   Rapid drug screen, urine    Collection Time: 05/18/25 10:09 AM   Result Value Ref Range    Amph/Meth UR Negative Negative    Barbiturate Ur Negative Negative    Benzodiazepine Urine Negative Negative    Cocaine  "Urine Negative Negative    Methadone Urine Negative Negative    Opiate Urine Negative Negative    PCP Ur Negative Negative    THC Urine Negative Negative    Oxycodone Urine Negative Negative    Fentanyl Urine Negative Negative    HYDROCODONE URINE Negative Negative   Urine Microscopic    Collection Time: 05/18/25 10:09 AM   Result Value Ref Range    RBC, UA 0-1 None Seen, 0-1, 1-2, 2-4, 0-5 /hpf    WBC, UA 1-2 None Seen, 0-1, 1-2, 0-5, 2-4 /hpf    Epithelial Cells Occasional None Seen, Occasional /hpf    Bacteria, UA Occasional None Seen, Occasional /hpf   Sodium, urine, random    Collection Time: 05/18/25 10:09 AM   Result Value Ref Range    Sodium, Ur 85.0 mmol/L   Osmolality, Urine, random    Collection Time: 05/18/25 10:09 AM   Result Value Ref Range    Osmolality, Ur 343 250 - 900 mmol/KG   ECG 12 lead    Collection Time: 05/18/25 10:11 AM   Result Value Ref Range    Ventricular Rate 82 BPM    Atrial Rate 82 BPM    FL Interval 148 ms    QRSD Interval 78 ms    QT Interval 382 ms    QTC Interval 446 ms    P Axis 49 degrees    QRS Axis 167 degrees    T Wave Axis 17 degrees   ECG 12 lead    Collection Time: 05/18/25 10:18 AM   Result Value Ref Range    Ventricular Rate 71 BPM    Atrial Rate 71 BPM    FL Interval 152 ms    QRSD Interval 78 ms    QT Interval 390 ms    QTC Interval 423 ms    P Axis 45 degrees    QRS Axis 163 degrees    T Wave Axis 11 degrees   Osmolality-\"If this is regarding a toxic alcohol, please STOP and consult medical  for further guidance.\"    Collection Time: 05/19/25  7:19 AM   Result Value Ref Range    Osmolality Serum 275 (L) 282 - 298 mmol/KG   CBC and differential    Collection Time: 05/19/25  7:19 AM   Result Value Ref Range    WBC 6.05 4.31 - 10.16 Thousand/uL    RBC 4.33 3.81 - 5.12 Million/uL    Hemoglobin 13.6 11.5 - 15.4 g/dL    Hematocrit 40.0 34.8 - 46.1 %    MCV 92 82 - 98 fL    MCH 31.4 26.8 - 34.3 pg    MCHC 34.0 31.4 - 37.4 g/dL    RDW 12.7 11.6 - 15.1 %    MPV 9.3 " 8.9 - 12.7 fL    Platelets 276 149 - 390 Thousands/uL   Comprehensive metabolic panel    Collection Time: 05/19/25  7:19 AM   Result Value Ref Range    Sodium 129 (L) 135 - 147 mmol/L    Potassium 3.9 3.5 - 5.3 mmol/L    Chloride 96 96 - 108 mmol/L    CO2 26 21 - 32 mmol/L    ANION GAP 7 4 - 13 mmol/L    BUN 5 5 - 25 mg/dL    Creatinine 0.42 (L) 0.60 - 1.30 mg/dL    Glucose 163 (H) 65 - 140 mg/dL    Calcium 8.7 8.4 - 10.2 mg/dL    Corrected Calcium 9.2 8.3 - 10.1 mg/dL    AST 10 (L) 13 - 39 U/L    ALT 9 7 - 52 U/L    Alkaline Phosphatase 60 34 - 104 U/L    Total Protein 6.3 (L) 6.4 - 8.4 g/dL    Albumin 3.4 (L) 3.5 - 5.0 g/dL    Total Bilirubin 0.30 0.20 - 1.00 mg/dL    eGFR 111 ml/min/1.73sq m   Phosphorus    Collection Time: 05/19/25  7:19 AM   Result Value Ref Range    Phosphorus 3.7 2.3 - 4.1 mg/dL   Magnesium    Collection Time: 05/19/25  7:19 AM   Result Value Ref Range    Magnesium 1.7 (L) 1.9 - 2.7 mg/dL   Cortisol Level, AM Specimen    Collection Time: 05/19/25  7:21 AM   Result Value Ref Range    Cortisol - AM 7.9 6.7 - 22.6 ug/dL        Lucas Madrid MD    This note has been constructed using a voice recognition system. There may be translation, syntax, or grammatical errors. If you have any questions, please contact the dictating provider.         [1] No Known Allergies

## 2025-05-19 NOTE — ASSESSMENT & PLAN NOTE
Psychiatric consulted  CT head without: Negative  Recently hospitalized 4/4-4/8 for encephalopathy.

## 2025-05-19 NOTE — ASSESSMENT & PLAN NOTE
Sodium-see above  Magnesium 1.7-2 g IV magnesium replacement ordered  Phosphorus and potassium stable  Monitor electrolytes replete as necessary

## 2025-05-19 NOTE — MALNUTRITION/BMI
This medical record reflects one or more clinical indicators suggestive of malnutrition and/or morbid obesity.    Malnutrition Findings:   Adult Malnutrition type: Chronic illness  Adult Degree of Malnutrition: Other severe protein calorie malnutrition  Malnutrition Characteristics: Fat loss, Muscle loss, Inadequate energy, Weight loss                360 Statement: Pt presents with severe protein calorie malnutrition as evidenced by BMI 13.16, <75% po intake > 1 month, sunken orbitals, clavicle protrusion, temporal scooping and 16% wt loss in 6 weeks (5/19/25 84 lbs, 4/4/25 100 lbs). Treat with diet and supplements BID.    BMI Findings:  Adult BMI Classifications: Underweight < 18.5        Body mass index is 13.16 kg/m².     See Nutrition note dated 5/19/25 for additional details.  Completed nutrition assessment is viewable in the nutrition documentation.
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
## 2025-05-19 NOTE — ASSESSMENT & PLAN NOTE
--Patient is alert and oriented with an intact attention and concentration, does not appear encephalopathic at this time

## 2025-05-19 NOTE — CONSULTS
"Consultation - Neurology   Name: Berta Smart 59 y.o. female I MRN: [id number]  Unit/Bed#: -01 I Date of Admission: 5/18/2025   Date of Service: 5/19/2025 I Hospital Day: 1     Inpatient consult to Neurology  Consult performed by: SUZY Trejo  Consult ordered by: SUZY Briscoe        Physician Requesting Evaluation: Jadon Kaplan MD   Reason for Evaluation / Principal Problem: Encephalopathy    Assessment & Plan  Encephalopathy  59 y.o.  female with anxiety, depression, tobacco use, alcoholism, UC with colostomy in place, who presented to [facility] on 5/18/25 with altered mental status.    Workup:  - CTH wo contrast 5/18/25:  \"No acute intracranial abnormality. \"  - Labs  - Coma panel: Salicylates less than 5, acetaminophen less than 2, ethanol less than 10  -  sodium 129  - UA with small leukocytes, negative nitrites  - UDS negative   - TSH 4.738, free T4 1.06  - procalcitonin <0.05  - lactic acid 1.4     On exam, patient is tangential and with flight of ideas. No focal deficits noted. Per discussion with attending neurologist, patient presented with similar symptoms previously and had unremarkable neurologic workup. Low suspicion for neurologic etiology at this time. Stronger suspicion for psych etiology.    Plan:  - Psych following; appreciate recommendations  - No further neuroimaging needed at this time  - Monitor neuro exam; notify with any changes  - Medical management and supportive care per primary team. Correction of any metabolic or infectious disturbances.   - No further inpatient neurology recommendations at this time. Please call with any questions.  - Case and treatment plan reviewed with attending neurologist, Dr. Desir. Please see attending attestation for any further recommendations.  Hyponatremia  - management per primary team   Alcoholism (HCC)  - ethanol <10 on presentation   - alcohol free x 3 months per chart review  - encourage continued cessation of alcohol use   - " "continue thiamine supplementation   Colostomy status (HCC)  - Medical management per primary team  Severe protein-calorie malnutrition (HCC)  Malnutrition Findings:   Adult Malnutrition type: Chronic illness  Adult Degree of Malnutrition: Other severe protein calorie malnutrition  Malnutrition Characteristics: Fat loss, Muscle loss, Inadequate energy, Weight loss                  360 Statement: Pt presents with severe protein calorie malnutrition as evidenced by BMI 13.16, <75% po intake > 1 month, sunken orbitals, clavicle protrusion, temporal scooping and 16% wt loss in 6 weeks (5/19/25 84 lbs, 4/4/25 100 lbs). Treat with diet and supplements BID.    BMI Findings:  Adult BMI Classifications: Underweight < 18.5        Body mass index is 13.16 kg/m².     - management per primary team   Bipolar 1 disorder with moderate shahida (HCC)      Berta Smart will not need outpatient follow up with Neurology. She will not require outpatient neurological testing.    History of Present Illness   Berta Smart is a 59 y.o.  female with anxiety, depression, tobacco use, alcoholism, UC with colostomy in place, who presented to [facility] on 5/18/25 with altered mental status.    Per ED notes: Patient reported feeling confused.  Patient stated she believes that she has had multiple grand mal seizures with sickness episodes in the past week.  Patient reported that she had been taken off seizure medications for several years and felt that she might have an infection, which typically triggers for seizures.  Patient reported having history of alcoholism but noting that she has not had alcohol for the past 3 months.  Sodium on arrival 129.  Lactic acid 1.4.  Procalcitonin less than 0.05.    Previous Neurologic History:   Patient evaluated by inpatient neurology team with OLVIN NEWELL in April 2025 for encephalopathy/altered mental status.  At that time, patient presented to the ED due to acting \"off\" and had been more confused over the past 2 days.  " "Patient's daughter provided majority of history during the encounter assess the patient's reliability as a historian was unclear.  Up to 2 months prior to April 2025 admission, patient had been drinking alcohol every day and then quit cold turkey.  Since quitting alcohol, patient had intense mood swings and was more irritable.  In the few days prior to presenting to the hospital in April 2025, patient had difficulty putting together concepts, such as understanding how to use a new cell phone.  Patient generally was noted to struggle with self-care as patient's daughter noted that patient does not shower, does not brush her teeth or does not brush her hair.  Patient with poor care of her ostomy bag.  During hospitalization, patient underwent MRI brain with and without contrast, which revealed no acute infarction, intracranial hemorrhage, or mass.  There was also no enhancement in the mamillary bodies on MRI that would concerning for Warnicke's encephalopathy.  There was a strong suspicion that patient with undiagnosed or untreated psychiatric disorder.  Patient was recommended to undergo psychiatric consultation. Patient reportedly had a seizure disorder that had been worked up in the past.  Patient previously followed with [place] neurologic Dr. Graff.  Patient had been on Dilantin in the past for seizures but had been off the medication for 10 to 20 years.  Patient reportedly managers her seizures by being \"in a safe place.\"  Patient stated that she wakes up with tongue bites and lip bites and that she has broken teeth in the past.  However, patient's  was unsure of the reliability of this report.  He was unsure when the last seizure patient experienced was.    Review of Systems   Unable to perform ROS: Acuity of condition       Historical Information   Past Medical History:   Diagnosis Date    Alcoholism (HCC)     Depression     Hypothyroidism     PTSD (post-traumatic stress disorder)      Past Surgical " History:   Procedure Laterality Date    COLOSTOMY      CYSTOSCOPY W/ URETERAL STENT PLACEMENT Left     EXPLORATORY LAPAROTOMY      SALPINGECTOMY Bilateral 02/2025     Social History     Tobacco Use    Smoking status: Every Day     Types: Cigarettes    Smokeless tobacco: Current   Vaping Use    Vaping status: Never Used   Substance and Sexual Activity    Alcohol use: Yes     Comment: drinks beer    Drug use: Not Currently     Comment: Denies use    Sexual activity: Not Currently     E-Cigarette/Vaping    E-Cigarette Use Never User      E-Cigarette/Vaping Substances    Nicotine No     THC No     CBD No     Flavoring No     Other No     Unknown No      Family History   Problem Relation Age of Onset    Heart disease Mother     Stroke Mother     Hypertension Mother     Kidney disease Mother     No Known Problems Father     No Known Problems Sister     No Known Problems Brother      Social History     Tobacco Use    Smoking status: Every Day     Types: Cigarettes    Smokeless tobacco: Current   Vaping Use    Vaping status: Never Used   Substance and Sexual Activity    Alcohol use: Yes     Comment: drinks beer    Drug use: Not Currently     Comment: Denies use    Sexual activity: Not Currently       Current Facility-Administered Medications:     acetaminophen (TYLENOL) tablet 650 mg, Q6H PRN    aluminum-magnesium hydroxide-simethicone (MAALOX) oral suspension 30 mL, Q6H PRN    Fluticasone Furoate-Vilanterol 100-25 mcg/actuation 1 puff, Daily    folic acid (FOLVITE) tablet 1 mg, Daily    melatonin tablet 3 mg, HS    multivitamin-minerals (CENTRUM) tablet 1 tablet, Daily    ondansetron (ZOFRAN) injection 4 mg, Q4H PRN    polyethylene glycol (MIRALAX) packet 17 g, Daily PRN    thiamine (VITAMIN B1) 500 mg in sodium chloride 0.9 % 50 mL IVPB, TID, Last Rate: 500 mg (05/19/25 1218) **FOLLOWED BY** [START ON 5/21/2025] thiamine (VITAMIN B1) 250 mg in sodium chloride 0.9 % 50 mL IVPB, Daily **FOLLOWED BY** [DISCONTINUED] thiamine  (VITAMIN B1) 100 mg in sodium chloride 0.9 % 50 mL IVPB, TID **FOLLOWED BY** [DISCONTINUED] thiamine (VITAMIN B1) 100 mg in sodium chloride 0.9 % 50 mL IVPB, Daily  Prior to Admission Medications   Prescriptions Last Dose Informant Patient Reported? Taking?   Fluticasone-Salmeterol (Advair Diskus) 100-50 mcg/dose inhaler   No No   Sig: Inhale 1 puff 2 (two) times a day Rinse mouth after use.   Ostomy Supplies (Premier Colostomy/Ileostomy) KIT  Self, Child No No   Sig: by Does not apply route daily   Patient not taking: Reported on 5/9/2025   PARoxetine (PAXIL) 10 mg tablet  Self, Child No No   Sig: Take 1 tablet (10 mg total) by mouth daily   Patient not taking: Reported on 5/6/2025   Thiamine HCl (vitamin B-1) 250 MG tablet  Self, Child Yes No   Sig: Take 250 mg by mouth daily   docusate sodium (COLACE) 100 mg capsule  Self, Child No No   Sig: Take 1 capsule (100 mg total) by mouth every 12 (twelve) hours   Patient not taking: Reported on 5/6/2025      Facility-Administered Medications: None     Patient has no known allergies.    Objective :  Temp:  [97.7 °F (36.5 °C)-99.3 °F (37.4 °C)] 99.3 °F (37.4 °C)  HR:  [80-94] 94  BP: ()/(68-89) 107/75  SpO2:  [91 %-95 %] 91 %  O2 Device: None (Room air)    Physical Exam:   Vital signs and nursing notes reviewed.     Constitutional: Appears comfortable. No diaphoresis. No acute distress.   HENT: Normocephalic, atraumatic.   Eyes: No scleral icterus or injection. No discharge.   Cardiovascular: Regular rate.   Pulmonary: No respiratory distress. Effort normal. No stridor or wheezing evident.   Musculoskeletal: Normal ROM. No tenderness, edema, or deformities noted.  Neurological: Detailed below.   Skin: No jaundice.  Psychiatric: Tangential. Flight of ideas.     Neurologic Exam:  Mental Status: Patient is awake and alert. Oriented x person and place. No dysarthria or obvious aphasia noted.    Cranial Nerves: No facial weakness noted. Tongue appears midline. Tracks  "examiner around room. No gaze preference noted.    Motor: Moves all four extremities without focal weakness noted.    No tremors noted.     Gait: Deferred       Lab Results: I have reviewed the following results:CBC:   Results from last 7 days   Lab Units 05/19/25  0719 05/18/25  0954   WBC Thousand/uL 6.05 7.10   RBC Million/uL 4.33 4.69   HEMOGLOBIN g/dL 13.6 14.8   HEMATOCRIT % 40.0 43.4   MCV fL 92 93   PLATELETS Thousands/uL 276 321   , BMP/CMP:   Results from last 7 days   Lab Units 05/19/25  0719 05/18/25  0954   SODIUM mmol/L 129* 129*   POTASSIUM mmol/L 3.9 4.3   CHLORIDE mmol/L 96 89*   CO2 mmol/L 26 32   BUN mg/dL 5 8   CREATININE mg/dL 0.42* 0.47*   CALCIUM mg/dL 8.7 9.7   AST U/L 10* 13   ALT U/L 9 11   ALK PHOS U/L 60 80   EGFR ml/min/1.73sq m 111 107   , Vitamin B12:   , HgBA1C:   , TSH:   Results from last 7 days   Lab Units 05/18/25  0954   TSH 3RD GENERATON uIU/mL 4.738*   , Coagulation:   , Lipid Profile:   , Ammonia:   , Urinalysis:   Results from last 7 days   Lab Units 05/18/25  1009   COLOR UA  Yellow   CLARITY UA  Cloudy   SPEC GRAV UA  1.015   PH UA  7.5   LEUKOCYTES UA  Small*   NITRITE UA  Negative   GLUCOSE UA mg/dl Negative   KETONES UA mg/dl Negative   BILIRUBIN UA  Negative   BLOOD UA  Negative   , Drug Screen:   Results from last 7 days   Lab Units 05/18/25  1009   BARBITURATE UR  Negative   BENZODIAZEPINE UR  Negative   THC UR  Negative   COCAINE UR  Negative   METHADONE URINE  Negative   OPIATE UR  Negative   PCP UR  Negative   , Medication Drug Levels:       Invalid input(s): \"CARBAMAZEPINE\", \"OXCARBAZEPINE\"  Recent Labs     05/19/25  0719   WBC 6.05   HGB 13.6   HCT 40.0      SODIUM 129*   K 3.9   CL 96   CO2 26   BUN 5   CREATININE 0.42*   GLUC 163*   MG 1.7*   PHOS 3.7     Imaging reviewed: CT head      This note was completed in part utilizing Dragon Software.  Grammatical errors, random word insertions, spelling mistakes, and incomplete sentences may be an occasional " consequence of this system secondary to software limitations, ambient noise, and hardware issues.  If you have any questions or concerns about the content, text, or information contained within the body of this dictation, please contact the provider for clarification.

## 2025-05-19 NOTE — UTILIZATION REVIEW
Initial Clinical Review    Admission: Date/Time/Statement:   Admission Orders (From admission, onward)       Ordered        05/18/25 1203  INPATIENT ADMISSION  Once                          Orders Placed This Encounter   Procedures    INPATIENT ADMISSION     Standing Status:   Standing     Number of Occurrences:   1     Level of Care:   Med Surg [16]     Estimated length of stay:   More than 2 Midnights     Certification:   I certify that inpatient services are medically necessary for this patient for a duration of greater than two midnights. See H&P and MD Progress Notes for additional information about the patient's course of treatment.     ED Arrival Information       Expected   -    Arrival   5/18/2025 09:33    Acuity   Urgent              Means of arrival   Ambulance    Escorted by   [facility]    Service   Hospitalist    Admission type   Emergency              Arrival complaint   Ams             Chief Complaint   Patient presents with    Altered Mental Status     Pt to ED from home via EMS. EMS reports that pt became combative towards her son and daughter. EMS also states that pt isn't taking her medication.       Initial Presentation: 59 y.o. female with PMHx including encephalopathy confabulation Warnicke's encephalopathy alcohol use disorder in remission adjustment disorder severe protein calorie malnutrition colostomy status postresection urine retention who presented on 5/18/25 to ED due to subjective altered mental status with endorsement of being aggressive towards family.  Patient thought to have a UTI and therefore altered mental status. In the ED, labs revealed Na 132, Cr 0.52. UA positive for small leuks. EKG NSR. CT head negative. Given 1L IVF and started on IV folic acid and IV mag.     Plan:  Admit Inpatient status Dx Hyponatremia:   med surg, Monitor electrolytes and replete prn. Fluid restriction. Monitor mental status. Daily colostomy care. Start CIWA monitoring, folic acid, thiamine and  MVT.     Anticipated Length of Stay/Certification Statement: Patient will be admitted on an inpatient basis with an anticipated length of stay of greater than 2 midnights secondary to hyponatremia .     Date: 5/19   Day 2:  Per Nephrology: Hyponatremia secondary of SSRI as well as alcohol use. Na continues at 129 today.  Continue IVF 1.5 L in 24 hrs, start salt tabs TID, continue to monitor sodium, labs in AM. Monitor electrolytes and replete prn. Continue CIWA monitoring.     ED Treatment-Medication Administration from 05/18/2025 0933 to 05/18/2025 1230         Date/Time Order Dose Route Action     05/18/2025 1021 sodium chloride 0.9 % bolus 1,000 mL 1,000 mL Intravenous New Bag     05/18/2025 1045 folic acid 1 mg, thiamine (VITAMIN B1) 100 mg in sodium chloride 0.9 % 100 mL IV piggyback -- Intravenous New Bag     05/18/2025 1119 magnesium sulfate 2 g/50 mL IVPB (premix) 2 g 2 g Intravenous New Bag            Scheduled Medications:  Fluticasone Furoate-Vilanterol, 1 puff, Inhalation, Daily  folic acid, 1 mg, Oral, Daily  magnesium sulfate, 2 g, Intravenous, Once  melatonin, 3 mg, Oral, HS  multivitamin-minerals, 1 tablet, Oral, Daily  thiamine, 500 mg, Intravenous, TID   Followed by  [START ON 5/21/2025] thiamine, 250 mg, Intravenous, Daily      Continuous IV Infusions: none     PRN Meds:  acetaminophen, 650 mg, Oral, Q6H PRN  aluminum-magnesium hydroxide-simethicone, 30 mL, Oral, Q6H PRN  ondansetron, 4 mg, Intravenous, Q4H PRN  polyethylene glycol, 17 g, Oral, Daily PRN      ED Triage Vitals [05/18/25 0936]   Temperature Pulse Respirations Blood Pressure SpO2 Pain Score   97.5 °F (36.4 °C) 78 18 132/78 97 % No Pain     Weight (last 2 days)       Date/Time Weight    05/18/25 1234 38.1 (84)            Vital Signs (last 3 days)       Date/Time Temp Pulse Resp BP MAP (mmHg) SpO2 O2 Device Patient Position - Orthostatic VS Gibran Coma Scale Score CIWA-Ar Total Pain    05/19/25 07:55:12 99.3 °F (37.4 °C) 94 -- 107/75  86 91 % -- -- -- -- --    05/19/25 07:53:31 99.3 °F (37.4 °C) 87 -- 97/77 84 93 % -- -- -- -- --    05/19/25 06:23:25 -- 85 -- 110/74 86 92 % -- -- -- 2 --    05/19/25 0200 -- -- -- -- -- -- -- -- -- 2 --    05/19/25 01:50:53 -- 80 -- 110/68 82 95 % -- -- -- -- --    05/18/25 22:29:40 97.7 °F (36.5 °C) 90 -- 119/74 89 94 % -- -- -- -- --    05/18/25 2208 -- -- -- -- -- -- None (Room air) -- 15 -- --    05/18/25 2058 -- -- -- -- -- -- -- -- -- -- No Pain    05/18/25 20:25:04 -- 84 -- 128/89 102 93 % -- -- -- 1 --    05/18/25 1500 -- -- -- -- -- 96 % None (Room air) -- 15 -- --    05/18/25 1400 97.6 °F (36.4 °C) 90 19 126/90 102 -- -- -- -- 1 --    05/18/25 13:57:41 -- -- 20 126/90 102 -- -- Standing - Orthostatic VS -- -- --    05/18/25 13:56:28 -- -- 20 116/96 103 -- -- Sitting - Orthostatic VS -- -- --    05/18/25 13:44:24 -- 78 20 111/70 84 95 % -- Lying - Orthostatic VS -- -- --    05/18/25 1250 97.6 °F (36.4 °C) 90 19 126/90 -- 95 % -- -- -- -- --    05/18/25 1238 -- -- -- -- -- 95 % None (Room air) -- -- 1 --    05/18/25 12:34:25 97.3 °F (36.3 °C) 82 20 135/86 102 97 % -- -- -- -- --    05/18/25 1234 97.3 °F (36.3 °C) 82 19 135/86 -- -- -- -- -- -- No Pain    05/18/25 1200 -- 78 18 122/78 94 95 % -- -- -- -- --    05/18/25 1130 -- 73 21 125/77 97 96 % -- -- -- -- --    05/18/25 1100 -- 76 18 133/91 105 97 % -- -- -- -- --    05/18/25 1030 -- 72 21 122/82 97 97 % None (Room air) -- 15 -- --    05/18/25 1000 -- 77 21 123/84 99 98 % -- -- -- -- --    05/18/25 0936 97.5 °F (36.4 °C) 78 18 132/78 -- 97 % None (Room air) Lying -- -- No Pain           CIWA-Ar Score       Row Name 05/19/25 06:23:25 05/19/25 0200 05/18/25 20:25:04       CIWA-Ar    Nausea and Vomiting 0 0 0    Tactile Disturbances 0 0 0    Tremor 0 0 0    Auditory Disturbances 0 0 0    Paroxysmal Sweats 0 0 0    Visual Disturbances 1 1 0    Anxiety 0 0 0    Headache, Fullness in Head 1 1 1    Agitation 0 0 0    Orientation and Clouding of Sensorium 0 0 0     [facility] Total 2 2 1                    Pertinent Labs/Diagnostic Test Results:   Radiology:  CT head without contrast   Final Interpretation by Henri Dobson DO (05/18 1128)      No acute intracranial abnormality.                  Workstation performed: [id number]           Cardiology:  ECG 12 lead   Final Result by John Reid MD (05/18 2035)   Normal sinus rhythm   Right axis deviation   Abnormal ECG   When compared with ECG of 18-May-2025 10:11, (unconfirmed)   No significant change was found   Confirmed by John Reid ([id number]) on 5/18/2025 8:35:33 PM      ECG 12 lead   Final Result by John Reid MD (05/18 2035)     Poor data quality, interpretation may be adversely affected   Normal sinus rhythm   Right axis deviation   Baseline Artifact   Abnormal ECG   Confirmed by John Reid ([id number]) on 5/18/2025 8:35:28 PM      ECG 12 lead   Final Result by John Reid MD (05/18 2035)     Poor data quality, interpretation may be adversely affected   Undetermined rhythm   Baseline Artifact   Right axis deviation   Abnormal ECG   Recommend repeat EKG   Confirmed by John Reid ([id number]) on 5/18/2025 8:35:14 PM        GI:  No orders to display           Results from last 7 days   Lab Units 05/19/25  0719 05/18/25  0954 05/12/25  1528   WBC Thousand/uL 6.05 7.10 7.97   HEMOGLOBIN g/dL 13.6 14.8 14.8   HEMATOCRIT % 40.0 43.4 44.5   PLATELETS Thousands/uL 276 321 248   TOTAL NEUT ABS Thousands/µL  --   --  6.45         Results from last 7 days   Lab Units 05/19/25  0719 05/18/25  0954 05/12/25  1528   SODIUM mmol/L 129* 129* 132*   POTASSIUM mmol/L 3.9 4.3 3.9   CHLORIDE mmol/L 96 89* 97   CO2 mmol/L 26 32 27   ANION GAP mmol/L 7 8 8   BUN mg/dL 5 8 9   CREATININE mg/dL 0.42* 0.47* 0.52*   EGFR ml/min/1.73sq m 111 107 104   CALCIUM mg/dL 8.7 9.7 9.3   MAGNESIUM mg/dL 1.7* 1.7*  --    PHOSPHORUS mg/dL 3.7 4.0  --      Results from last 7 days   Lab Units 05/19/25  0719 05/18/25  0954 05/12/25  1528   AST U/L 10* 13  14   ALT U/L 9 11 15   ALK PHOS U/L 60 80 68   TOTAL PROTEIN g/dL 6.3* 7.9 7.0   ALBUMIN g/dL 3.4* 4.2 4.1   TOTAL BILIRUBIN mg/dL 0.30 0.37 0.64         Results from last 7 days   Lab Units 05/19/25  0719 05/18/25  0954 05/12/25  1528   GLUCOSE RANDOM mg/dL 163* 116 95     Results from last 7 days   Lab Units 05/18/25  0954   HS TNI 0HR ng/L <2     Results from last 7 days   Lab Units 05/18/25  0954   TSH 3RD GENERATON uIU/mL 4.738*     Results from last 7 days   Lab Units 05/18/25  0954   PROCALCITONIN ng/ml <0.05     Results from last 7 days   Lab Units 05/18/25  0954   LACTIC ACID mmol/L 1.4     Results from last 7 days   Lab Units 05/18/25  0954   BNP pg/mL 25     Results from last 7 days   Lab Units 05/12/25  1528   LIPASE u/L 26     Results from last 7 days   Lab Units 05/18/25  1009   OSMO UR mmol/     Results from last 7 days   Lab Units 05/18/25  1009 05/12/25  1555   CLARITY UA  Cloudy Clear   COLOR UA  Yellow Yellow   SPEC GRAV UA  1.015 1.015   PH UA  7.5 6.5   GLUCOSE UA mg/dl Negative Negative   KETONES UA mg/dl Negative Negative   BLOOD UA  Negative Negative   PROTEIN UA mg/dl Negative Negative   NITRITE UA  Negative Negative   BILIRUBIN UA  Negative Negative   UROBILINOGEN UA (BE) mg/dl <2.0 <2.0   LEUKOCYTES UA  Small* Negative   WBC UA /hpf 1-2  --    RBC UA /hpf 0-1  --    BACTERIA UA /hpf Occasional  --    EPITHELIAL CELLS WET PREP /hpf Occasional  --    SODIUM UR mmol/L 85.0  --      Results from last 7 days   Lab Units 05/18/25  1009   AMPH/METH  Negative   BARBITURATE UR  Negative   BENZODIAZEPINE UR  Negative   COCAINE UR  Negative   METHADONE URINE  Negative   OPIATE UR  Negative   PCP UR  Negative   THC UR  Negative     Results from last 7 days   Lab Units 05/18/25  0954   ETHANOL LVL mg/dL <10   ACETAMINOPHEN LVL ug/mL <2*   SALICYLATE LVL mg/dL <5*     Past Medical History:   Diagnosis Date    Alcoholism (HCC)     Depression     Hypothyroidism     PTSD (post-traumatic stress  disorder)      Present on Admission:   Encephalopathy   Severe protein-calorie malnutrition (HCC)   Alcoholism (HCC)      Admitting Diagnosis: Hypomagnesemia [E83.42]  Hyponatremia [E87.1]  Altered mental status [R41.82]  AMS (altered mental status) [R41.82]  Age/Sex: 59 y.o. female    Network Utilization Review Department  ATTENTION: Please call with any questions or concerns to [phone] and carefully listen to the prompts so that you are directed to the right person. All voicemails are confidential.   For Discharge needs, contact Care Management DC Support Team at [phone] opt. [phone]  Send all requests for admission clinical reviews, approved or denied determinations and any other requests to dedicated fax number below belonging to the campus where the patient is receiving treatment. List of dedicated fax numbers for the Facilities:  FACILITY NAME UR FAX NUMBER   ADMISSION DENIALS (Administrative/Medical Necessity) [phone]   DISCHARGE SUPPORT TEAM (NETWORK) [phone]   PARENT CHILD HEALTH (Maternity/NICU/Pediatrics) [phone]   [facility] [phone]   [facility] [phone]   [facility] [phone]   [facility] [phone]   [facility] [phone]   [facility] [phone]   [facility] [phone]   [facility] [phone]   [facility] [phone]   [facility] [phone]   [facility] [phone]   [facility] [phone]

## 2025-05-19 NOTE — PLAN OF CARE
Problem: Potential for Falls  Goal: Patient will remain free of falls  Description: INTERVENTIONS:  - Educate patient/family on patient safety including physical limitations  - Instruct patient to call for assistance with activity   - Consider consulting OT/PT to assist with strengthening/mobility based on AM PAC & JH-HLM score  - Consult OT/PT to assist with strengthening/mobility   - Keep Call bell within reach  - Keep bed low and locked with side rails adjusted as appropriate  - Keep care items and personal belongings within reach  - Initiate and maintain comfort rounds  - Make Fall Risk Sign visible to staff  - Offer Toileting every 2 Hours, in advance of need  - Initiate/Maintain bed/chair alarm  - Obtain necessary fall risk management equipment: yellow bracelet, yellow socks, yellow fall risk flag displeyed  - Apply yellow socks and bracelet for high fall risk patients  - Consider moving patient to room near nurses station  Outcome: Progressing     Problem: PAIN - ADULT  Goal: Verbalizes/displays adequate comfort level or baseline comfort level  Description: Interventions:  - Encourage patient to monitor pain and request assistance  - Assess pain using appropriate pain scale  - Administer analgesics as ordered based on type and severity of pain and evaluate response  - Implement non-pharmacological measures as appropriate and evaluate response  - Consider cultural and social influences on pain and pain management  - Notify physician/advanced practitioner if interventions unsuccessful or patient reports new pain  - Educate patient/family on pain management process including their role and importance of  reporting pain   - Provide non-pharmacologic/complimentary pain relief interventions  Outcome: Progressing     Problem: INFECTION - ADULT  Goal: Absence or prevention of progression during hospitalization  Description: INTERVENTIONS:  - Assess and monitor for signs and symptoms of infection  - Monitor  lab/diagnostic results  - Monitor all insertion sites, i.e. indwelling lines, tubes, and drains  - Monitor endotracheal if appropriate and nasal secretions for changes in amount and color  - [place] appropriate cooling/warming therapies per order  - Administer medications as ordered  - Instruct and encourage patient and family to use good hand hygiene technique  - Identify and instruct in appropriate isolation precautions for identified infection/condition  Outcome: Progressing  Goal: Absence of fever/infection during neutropenic period  Description: INTERVENTIONS:  - Monitor WBC  - Perform strict hand hygiene  - Limit to healthy visitors only  - No plants, dried, fresh or silk flowers with cowan in patient room  Outcome: Progressing     Problem: SAFETY ADULT  Goal: Patient will remain free of falls  Description: INTERVENTIONS:  - Educate patient/family on patient safety including physical limitations  - Instruct patient to call for assistance with activity   - Consider consulting OT/PT to assist with strengthening/mobility based on AM PAC & JH-HLM score  - Consult OT/PT to assist with strengthening/mobility   - Keep Call bell within reach  - Keep bed low and locked with side rails adjusted as appropriate  - Keep care items and personal belongings within reach  - Initiate and maintain comfort rounds  - Make Fall Risk Sign visible to staff  - Offer Toileting every 2 Hours, in advance of need  - Initiate/Maintain bed/chair alarm  - Obtain necessary fall risk management equipment: yellow bracelet, yellow socks, yellow fall risk flag displayed  - Apply yellow socks and bracelet for high fall risk patients  - Consider moving patient to room near nurses station  Outcome: Progressing  Goal: Maintain or return to baseline ADL function  Description: INTERVENTIONS:  -  Assess patient's ability to carry out ADLs; assess patient's baseline for ADL function and identify physical deficits which impact ability to perform ADLs  (bathing, care of mouth/teeth, toileting, grooming, dressing, etc.)  - Assess/evaluate cause of self-care deficits   - Assess range of motion  - Assess patient's mobility; develop plan if impaired  - Assess patient's need for assistive devices and provide as appropriate  - Encourage maximum independence but intervene and supervise when necessary  - Involve family in performance of ADLs  - Assess for home care needs following discharge   - Consider OT consult to assist with ADL evaluation and planning for discharge  - Provide patient education as appropriate  - Monitor functional capacity and physical performance, use of AM PAC & JH-HLM   - Monitor gait, balance and fatigue with ambulation    Outcome: Progressing  Goal: Maintains/Returns to pre admission functional level  Description: INTERVENTIONS:  - Perform AM-PAC 6 Click Basic Mobility/ Daily Activity assessment daily.  - Set and communicate daily mobility goal to care team and patient/family/caregiver.   - Collaborate with rehabilitation services on mobility goals if consulted  - Perform Range of Motion 3 times a day.  - Reposition patient every 3 hours.  - Dangle patient 3 times a day  - Stand patient 3 times a day  - Ambulate patient 3 times a day  - Out of bed to chair 3 times a day   - Out of bed for meals 3 times a day  - Out of bed for toileting  - Record patient progress and toleration of activity level   Outcome: Progressing     Problem: DISCHARGE PLANNING  Goal: Discharge to home or other facility with appropriate resources  Description: INTERVENTIONS:  - Identify barriers to discharge w/patient and caregiver  - Arrange for needed discharge resources and transportation as appropriate  - Identify discharge learning needs (meds, wound care, etc.)  - Arrange for interpretive services to assist at discharge as needed  - Refer to Case Management Department for coordinating discharge planning if the patient needs post-hospital services based on  physician/advanced practitioner order or complex needs related to functional status, cognitive ability, or social support system  Outcome: Progressing     Problem: Knowledge Deficit  Goal: Patient/family/caregiver demonstrates understanding of disease process, treatment plan, medications, and discharge instructions  Description: Complete learning assessment and assess knowledge base.  Interventions:  - Provide teaching at level of understanding  - Provide teaching via preferred learning methods  Outcome: Progressing

## 2025-05-19 NOTE — ASSESSMENT & PLAN NOTE
Per chart review, patient with noted hypomagnesemia at 1.7.  Will replete with magnesium sulfate 2 g, once.  Will recheck magnesium level in the AM.

## 2025-05-19 NOTE — ASSESSMENT & PLAN NOTE
Malnutrition Findings:   Adult Malnutrition type: Chronic illness  Adult Degree of Malnutrition: Other severe protein calorie malnutrition  Malnutrition Characteristics: Fat loss, Muscle loss, Inadequate energy, Weight loss                  360 Statement: Pt presents with severe protein calorie malnutrition as evidenced by BMI 13.16, <75% po intake > 1 month, sunken orbitals, clavicle protrusion, temporal scooping and 16% wt loss in 6 weeks (5/19/25 84 lbs, 4/4/25 100 lbs). Treat with diet and supplements BID.    BMI Findings:  Adult BMI Classifications: Underweight < 18.5        Body mass index is 13.16 kg/m².     - management per primary team

## 2025-05-19 NOTE — ASSESSMENT & PLAN NOTE
Patient with recent admission, where she was found to have confabulations and thought to have Warnicke's encephalopathy, as patient self medicates with alcohol.  On prior admission, workup was negative.    Patient does not meet SIRS criteria.  CTH - negative   UDS - negative     Patient presenting still having extreme confabulations.  Will continue high-dose thiamine, folic acid, and multivitamin.  Continue CIWA protocol.  Continue delirium precautions.  Will consult psychiatry.  Will need petition for 302 for involuntary psychiatric hospitalization when medically cleared.  Will consult neurology.

## 2025-05-19 NOTE — ASSESSMENT & PLAN NOTE
S/P colon resection approx. 15 years ago.   Continue to monitor output.    Continue colostomy care.

## 2025-05-19 NOTE — ASSESSMENT & PLAN NOTE
Patient presented to ED due to family clinic patient with altered mental status and aggression towards family members.  Per chart review, it appears patient with recent mission on 4/4 to 4/8 for encephalopathy, where it was thought to be Warnicke's encephalopathy and on high-dose thiamine.      On admission, patient noted to have hyponatremia of 129.   osmolality, serum (275), osmolality, urine (343); sodium, urine (85.0)   Continue FR 1500 mL.    Will consult nephrology.  Continue to monitor sNa, daily.

## 2025-05-19 NOTE — PLAN OF CARE
Problem: INFECTION - ADULT  Goal: Absence or prevention of progression during hospitalization  Description: INTERVENTIONS:  - Assess and monitor for signs and symptoms of infection  - Monitor lab/diagnostic results  - Monitor all insertion sites, i.e. indwelling lines, tubes, and drains  - Monitor endotracheal if appropriate and nasal secretions for changes in amount and color  - [place] appropriate cooling/warming therapies per order  - Administer medications as ordered  - Instruct and encourage patient and family to use good hand hygiene technique  - Identify and instruct in appropriate isolation precautions for identified infection/condition  Outcome: Progressing  Goal: Absence of fever/infection during neutropenic period  Description: INTERVENTIONS:  - Monitor WBC  - Perform strict hand hygiene  - Limit to healthy visitors only  - No plants, dried, fresh or silk flowers with cowan in patient room  Outcome: Progressing

## 2025-05-20 PROBLEM — E83.42 HYPOMAGNESEMIA: Status: RESOLVED | Noted: 2025-05-19 | Resolved: 2025-05-20

## 2025-05-20 LAB
ALBUMIN SERPL BCG-MCNC: 3.8 G/DL (ref 3.5–5)
ALP SERPL-CCNC: 62 U/L (ref 34–104)
ALT SERPL W P-5'-P-CCNC: 8 U/L (ref 7–52)
ANION GAP SERPL CALCULATED.3IONS-SCNC: 8 MMOL/L (ref 4–13)
AST SERPL W P-5'-P-CCNC: 11 U/L (ref 13–39)
BILIRUB SERPL-MCNC: 0.4 MG/DL (ref 0.2–1)
BUN SERPL-MCNC: 8 MG/DL (ref 5–25)
CALCIUM SERPL-MCNC: 9.2 MG/DL (ref 8.4–10.2)
CHLORIDE SERPL-SCNC: 99 MMOL/L (ref 96–108)
CO2 SERPL-SCNC: 27 MMOL/L (ref 21–32)
CREAT SERPL-MCNC: 0.51 MG/DL (ref 0.6–1.3)
ERYTHROCYTE [DISTWIDTH] IN BLOOD BY AUTOMATED COUNT: 12.9 % (ref 11.6–15.1)
GFR SERPL CREATININE-BSD FRML MDRD: 104 ML/MIN/1.73SQ M
GLUCOSE SERPL-MCNC: 106 MG/DL (ref 65–140)
HCT VFR BLD AUTO: 42.8 % (ref 34.8–46.1)
HGB BLD-MCNC: 14.6 G/DL (ref 11.5–15.4)
MAGNESIUM SERPL-MCNC: 2.1 MG/DL (ref 1.9–2.7)
MCH RBC QN AUTO: 31.5 PG (ref 26.8–34.3)
MCHC RBC AUTO-ENTMCNC: 34.1 G/DL (ref 31.4–37.4)
MCV RBC AUTO: 92 FL (ref 82–98)
PLATELET # BLD AUTO: 326 THOUSANDS/UL (ref 149–390)
PMV BLD AUTO: 9.8 FL (ref 8.9–12.7)
POTASSIUM SERPL-SCNC: 4.2 MMOL/L (ref 3.5–5.3)
PROT SERPL-MCNC: 7.1 G/DL (ref 6.4–8.4)
RBC # BLD AUTO: 4.64 MILLION/UL (ref 3.81–5.12)
SODIUM SERPL-SCNC: 134 MMOL/L (ref 135–147)
WBC # BLD AUTO: 7.95 THOUSAND/UL (ref 4.31–10.16)

## 2025-05-20 PROCEDURE — 85027 COMPLETE CBC AUTOMATED: CPT | Performed by: INTERNAL MEDICINE

## 2025-05-20 PROCEDURE — 83735 ASSAY OF MAGNESIUM: CPT | Performed by: INTERNAL MEDICINE

## 2025-05-20 PROCEDURE — 99232 SBSQ HOSP IP/OBS MODERATE 35: CPT | Performed by: INTERNAL MEDICINE

## 2025-05-20 PROCEDURE — 99232 SBSQ HOSP IP/OBS MODERATE 35: CPT

## 2025-05-20 PROCEDURE — 80053 COMPREHEN METABOLIC PANEL: CPT | Performed by: INTERNAL MEDICINE

## 2025-05-20 RX ORDER — DIVALPROEX SODIUM 125 MG/1
250 CAPSULE, COATED PELLETS ORAL EVERY 12 HOURS SCHEDULED
Status: DISCONTINUED | OUTPATIENT
Start: 2025-05-20 | End: 2025-05-22 | Stop reason: HOSPADM

## 2025-05-20 RX ORDER — OLANZAPINE 5 MG/1
2.5 TABLET, FILM COATED ORAL EVERY 6 HOURS PRN
Status: DISCONTINUED | OUTPATIENT
Start: 2025-05-20 | End: 2025-05-20

## 2025-05-20 RX ORDER — OLANZAPINE 5 MG/1
5 TABLET, FILM COATED ORAL EVERY 6 HOURS PRN
Status: DISCONTINUED | OUTPATIENT
Start: 2025-05-20 | End: 2025-05-22 | Stop reason: HOSPADM

## 2025-05-20 RX ORDER — OLANZAPINE 10 MG/2ML
5 INJECTION, POWDER, FOR SOLUTION INTRAMUSCULAR EVERY 6 HOURS PRN
Status: DISCONTINUED | OUTPATIENT
Start: 2025-05-20 | End: 2025-05-22 | Stop reason: HOSPADM

## 2025-05-20 RX ORDER — OLANZAPINE 10 MG/2ML
5 INJECTION, POWDER, FOR SOLUTION INTRAMUSCULAR EVERY 6 HOURS PRN
Status: DISCONTINUED | OUTPATIENT
Start: 2025-05-20 | End: 2025-05-20

## 2025-05-20 RX ORDER — TAMSULOSIN HYDROCHLORIDE 0.4 MG/1
0.4 CAPSULE ORAL
Status: DISCONTINUED | OUTPATIENT
Start: 2025-05-20 | End: 2025-05-22 | Stop reason: HOSPADM

## 2025-05-20 RX ORDER — TORSEMIDE 10 MG/1
10 TABLET ORAL ONCE
Status: COMPLETED | OUTPATIENT
Start: 2025-05-20 | End: 2025-05-20

## 2025-05-20 RX ADMIN — DIVALPROEX SODIUM 250 MG: 125 CAPSULE, COATED PELLETS ORAL at 21:51

## 2025-05-20 RX ADMIN — THIAMINE HYDROCHLORIDE 500 MG: 100 INJECTION, SOLUTION INTRAMUSCULAR; INTRAVENOUS at 09:12

## 2025-05-20 RX ADMIN — TORSEMIDE 10 MG: 10 TABLET ORAL at 13:27

## 2025-05-20 RX ADMIN — Medication 3 MG: at 21:51

## 2025-05-20 RX ADMIN — TAMSULOSIN HYDROCHLORIDE 0.4 MG: 0.4 CAPSULE ORAL at 18:23

## 2025-05-20 NOTE — PROGRESS NOTES
Progress Note - Nephrology   Name: Berta Smart 59 y.o. female I MRN: [id number]  Unit/Bed#: -01 I Date of Admission: 5/18/2025   Date of Service: 5/20/2025 I Hospital Day: 2    Assessment & Plan  Hyponatremia  Current sodium 134, upon admission sodium 129  Patient received 7.5 mg of tolvaptan 5/19/2025 around 4 PM. Salt tabs d/c. Pt was taken off FR at that time.   Per Epic records, appears to have intermittent history of hyponatremia since 2015.  Pt noted to be on Paxil via home medications  History of alcoholism - did not answer as to what her last drink was.   Workup:   Serum Osmo: 275, Urine Osmo: 343, Urine Na:TBC  Uric Acid: normal, AM Cortisol: normal, TSH: 4.738  Hyponatremia secondary of SSRI as well as alcohol use. Would not rule out possible change in appetite/poor oral intake.   Discussed with primary team as well as case management about discharge with 302 as soon as possible. Recommend monitoring patient for one more day since patient received medication not even 24 hours ago.   Patient on 2L fluid restriction since patient no longer receiving tolvaptan for today  Recheck sodium in AM  Urinary retention  Dietrich catheter placed 5/20  Pt previously bladder scan for 716 cc of urine  Monitor I&O  At high risk for electrolyte imbalance  Sodium- improved - see above  Magnesium 2.1 s/p replacement. Stable.   Phosphorus and potassium stable  Monitor electrolytes replete as necessary  Alcoholism (HCC)  On CIWA  Per primary team  Encephalopathy  Psychiatric consulted  CT head without: Negative  Recently hospitalized 4/4-4/8 for encephalopathy.   Bipolar 1 disorder with moderate shahida (HCC)  Recommend avoiding SSRI. Will defer to Psych for her psych medications        Subjective   Pt denies pain. Pt not cooperative during assessment/discussion and exhibiting increased agitation. Unable to complete PE at this time.        Objective :  Temp:  [97.5 °F (36.4 °C)-98.1 °F (36.7 °C)] 97.5 °F (36.4 °C)  HR:   "[80-84] 80  BP: ()/(70-89) 134/78  Resp:  [16] 16  SpO2:  [95 %] 95 %  O2 Device: None (Room air)    Current Weight: Weight - Scale: 38.1 kg (83 lb 15.9 oz)  First Weight: Weight - Scale: 38.1 kg (83 lb 15.9 oz)  I/O         05/18 0701 05/19 0700 05/19 0701 05/20 0700 05/20 0701 05/21 0700    P.O. 221 660     IV Piggyback 1150 250     Total Intake(mL/kg) 1371 (36) 910 (23.9)     Urine (mL/kg/hr) 2755 2500 (2.7) 300 (1.7)    Total Output 2755 2500 300    Net -8010 -1590 -300                 Physical Exam  Vitals and nursing note reviewed.   Constitutional:       Comments: Unable to complete PE d/t increased patient agitation as well as being uncooperative. Pt mentioning how her \"hospital bill here\" is three times the amount it should be. As well as how \"no one can find an IV pole for her stuff\". Pt seen pulling at angelo catheter and ostomy bag. Pt remained in her bed. Spoke with floor staff in regard to patient pulling at angelo cath.          Medications:  Current Medications[1]      Lab Results: I have reviewed the following results:  Results from last 7 days   Lab Units 05/20/25  0444 05/19/25  0719 05/18/25  0954   WBC Thousand/uL 7.95 6.05 7.10   HEMOGLOBIN g/dL 14.6 13.6 14.8   HEMATOCRIT % 42.8 40.0 43.4   PLATELETS Thousands/uL 326 276 321   POTASSIUM mmol/L 4.2 3.9 4.3   CHLORIDE mmol/L 99 96 89*   CO2 mmol/L 27 26 32   BUN mg/dL 8 5 8   CREATININE mg/dL 0.51* 0.42* 0.47*   CALCIUM mg/dL 9.2 8.7 9.7   MAGNESIUM mg/dL 2.1 1.7* 1.7*   PHOSPHORUS mg/dL  --  3.7 4.0   ALBUMIN g/dL 3.8 3.4* 4.2       Administrative Statements     Portions of the record may have been created with voice recognition software. Occasional wrong word or \"sound a like\" substitutions may have occurred due to the inherent limitations of voice recognition software. Read the chart carefully and recognize, using context, where substitutions have occurred.If you have any questions, please contact the dictating provider.       [1] "   Current Facility-Administered Medications:     acetaminophen (TYLENOL) tablet 650 mg, 650 mg, Oral, Q6H PRN, Swetha Baker MD    aluminum-magnesium hydroxide-simethicone (MAALOX) oral suspension 30 mL, 30 mL, Oral, Q6H PRN, Swetha Baker MD    divalproex sodium (DEPAKOTE SPRINKLE) capsule 250 mg, 250 mg, Oral, Q12H SHWETA, Yue Chavarria PA-C    Fluticasone Furoate-Vilanterol 100-25 mcg/actuation 1 puff, 1 puff, Inhalation, Daily, Swetha Baker MD, 1 puff at 05/19/25 1208    folic acid (FOLVITE) tablet 1 mg, 1 mg, Oral, Daily, Swetha Baker MD, 1 mg at 05/19/25 1208    melatonin tablet 3 mg, 3 mg, Oral, HS, Swetha Baker MD, 3 mg at 05/19/25 2121    multivitamin-minerals (CENTRUM) tablet 1 tablet, 1 tablet, Oral, Daily, Swetha Baker MD, 1 tablet at 05/19/25 1208    OLANZapine (ZyPREXA) IM injection 5 mg, 5 mg, Intramuscular, Q6H PRN **OR** OLANZapine (ZyPREXA) tablet 5 mg, 5 mg, Oral, Q6H PRN, Yue Chavarria PA-C    ondansetron (ZOFRAN) injection 4 mg, 4 mg, Intravenous, Q4H PRN, Swetha Baker MD    polyethylene glycol (MIRALAX) packet 17 g, 17 g, Oral, Daily PRN, Swetha Baker MD    tamsulosin (FLOMAX) capsule 0.4 mg, 0.4 mg, Oral, Daily With Dinner, Yue Chavarria PA-C    [COMPLETED] thiamine (VITAMIN B1) 500 mg in sodium chloride 0.9 % 50 mL IVPB, 500 mg, Intravenous, TID, Last Rate: 100 mL/hr at 05/20/25 0912, 500 mg at 05/20/25 0912 **FOLLOWED BY** [START ON 5/21/2025] thiamine (VITAMIN B1) 250 mg in sodium chloride 0.9 % 50 mL IVPB, 250 mg, Intravenous, Daily **FOLLOWED BY** [DISCONTINUED] thiamine (VITAMIN B1) 100 mg in sodium chloride 0.9 % 50 mL IVPB, 100 mg, Intravenous, TID **FOLLOWED BY** [DISCONTINUED] thiamine (VITAMIN B1) 100 mg in sodium chloride 0.9 % 50 mL IVPB, 100 mg, Intravenous, Daily, Swetha Baker MD

## 2025-05-20 NOTE — ASSESSMENT & PLAN NOTE
Dietrich catheter placed early this am 5/20 for retention   Per chart review patient had prior urinary catheter for retention in the past   Start flomax  Likely maintain on DC with outpatient urology f/u

## 2025-05-20 NOTE — ASSESSMENT & PLAN NOTE
Malnutrition Findings:   Adult Malnutrition type: Chronic illness  BMI Findings:  Adult BMI Classifications: Underweight < 18.5  Body mass index is 13.16 kg/m².     Will consult nutrition during admission.  Continue encourage adequate oral hydration and nutrition.

## 2025-05-20 NOTE — ASSESSMENT & PLAN NOTE
Hx of low sodium previously  Na on admission 129   Osm studies consistent with SIADH  Prescribed paxil which may cause SIADH however patient states she was not taking   Nephro following   S/P tolvaptan x 1 on 5/19   Currently off SSRI - defer resuming to psych   Psych recommended Depakote however patient has been refusing this anyway  Of note this may contribute to SIADH if she ends up taking this  Continue PO FR   Cleared for discharge from nephrology standpoint
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
## 2025-05-20 NOTE — ASSESSMENT & PLAN NOTE
Per chart review, patient with noted hypomagnesemia at 1.7.  Will replete with magnesium sulfate 2 g, once.  resolved

## 2025-05-20 NOTE — DISCHARGE SUMMARY
Discharge Summary - Hospitalist   Name: Berta Smart 59 y.o. female I MRN: [id number]  Unit/Bed#: -01 I Date of Admission: 5/18/2025   Date of Service: 5/22/2025 I Hospital Day: 4     Assessment & Plan  Encephalopathy  Presented for AMS and reported aggression toward family members. Recently admitted 4/4-4/8 for encephalopathy thought possible 2/2 Wernicke's vs UTI, treated with high dose thiamine due to hx of ETOH use  CTH: no acute intracranial abnormality   Not meeting SIRS criteria, no signs/sx of infection  UA negative for infection  Hyponatremic (129) on admission, see below   Neurology consulted   Continue thiamine taper  Non-focal neurologic exam  Suspected primary psychiatric illness   No further inpatient neurologic recommendations   Psychiatry consulted   Suspect acute manic episode in setting of bipolar 1 disorder  Recommending depakote 250 mg BID --> patient refusing to take this   Recommend PO zyprexa 5 mg PO or IM for agitation   [facility]  Hyponatremia (Resolved: 5/21/2025)  Hx of low sodium previously  Na on admission 129   Osm studies consistent with SIADH  Prescribed paxil which may cause SIADH however patient states she was not taking   Nephro following   S/P tolvaptan x 1 on 5/19   Currently off SSRI - defer resuming to psych   Psych recommended Depakote however patient has been refusing this anyway  Of note this may contribute to SIADH if she ends up taking this  Continue PO FR   Cleared for discharge from nephrology standpoint  Colostomy status (HCC)  Hx of Ulcerative colitis. S/P colon resection approx. 15 years ago.   Continue to monitor output.    Continue colostomy care.    Alcoholism (HCC)  Will continue CIWA protocol  On thiamine taper  Continue to encourage continued sensation.   Severe protein-calorie malnutrition (HCC)  Malnutrition Findings:   Adult Malnutrition type: Chronic illness  BMI Findings:  Adult BMI Classifications: Underweight <  18.5  Body mass index is 13.16 kg/m².     Will consult nutrition during admission.  Continue encourage adequate oral hydration and nutrition.  Urinary retention  Dietrich catheter placed early this am 5/20 for retention   Per chart review patient had prior urinary catheter for retention in the past   Cont flomax  Likely maintain on DC with outpatient urology f/u     Medical Problems       Resolved Problems  Date Reviewed: 5/9/2025          Resolved    Hyponatremia 5/21/2025     Resolved by  Shayla Mukherjee PA-C    Hypomagnesemia 5/20/2025     Resolved by  Yue Chavarria PA-C        Discharging Physician / Practitioner: Shayla Mukherjee PA-C  PCP: Isabella Turner MD  Admission Date:   Admission Orders (From admission, onward)       Ordered        05/18/25 1203  INPATIENT ADMISSION  Once            Signed and Held  ED TO DIFFERENT CAMPUS [facility] UNIT or INPATIENT MEDICAL UNIT to [facility] (using Discharge Readmit Navigator) - Admit Patient to  Behavioral Health Unit  Once                          Discharge Date: 05/22/25     Next Steps for Physician/AP Assuming Care:  Follow up with psychiatry, urology    Test Results Pending at Discharge (will require follow up):  None    Medication Changes for Discharge & Rationale:   TBD  See after visit summary for reconciled discharge medications provided to patient and/or family.     Consultations During Hospital Stay:  Neurology  Psychiatry    Procedures Performed:   none    Significant Findings / Test Results:   CT head without contrast   Final Result by Henri Dobson DO (05/18 1128)      No acute intracranial abnormality.                  Workstation performed: [id number]               Incidental Findings:   none    Hospital Course:   Berta Smart is a 59 y.o. female patient with PMH of bipolar 1 disorder, anxiety/depression, hypothyroidism, PTSD, urinary retention, malnutrition, ulcerative colitis status post colostomy, alcohol use who originally  presented to the hospital on 5/18/2025 due to altered mental status and family report of patient showing aggressive behavior towards them, combativeness.  On admission patient noted to be tangential, confabulating, poor historian and noncompliant with her home medications. CT head normal.  No signs or symptoms of infection, UA negative for infection.  Patient with hyponatremia with sodium level 129 on admission which was thought secondary to SIADH.  Nephrology was consulted and patient was treated with oral fluid restriction and tolvaptan along with discontinuation of prior SSRI.  She was cleared for discharge from nephrology standpoint on oral fluid restriction and outpatient follow-up with lab work.    Neurology and psychiatry were consulted.  Given nonfocal neurologic exam and clinical context, neurology suspected primary underlying psychiatric disorder.  Psychiatry evaluated patient and noted concern for acute manic episode in setting of bipolar 1 disorder.  Psychiatry recommended Depakote twice daily however patient refused this.  Psychiatry recommended 302 which was upheld on 5/20.  Patient will be discharged to inpatient psych at [facility].      Please see above list of diagnoses and related plan for additional information.     Discharge Day Visit / Exam:   * Please refer to separate progress note for these details *    Discussion with Family: Patient declined call to .     Discharge instructions/Information to patient and family:   See after visit summary for information provided to patient and family.      Provisions for Follow-Up Care:  See after visit summary for information related to follow-up care and any pertinent home health orders.      Mobility at time of Discharge:   Basic Mobility Inpatient Raw Score: 18  JH-HLM Goal: 6: Walk 10 steps or more  JH-HLM Achieved: 2: Bed activities/Dependent transfer  HLM Goal NOT achieved. Continue to encourage mobility in post discharge setting.      Disposition:   Inpatient Psychiatry at [facility]    Planned Readmission: Yes - IP U    Administrative Statements   Discharge Statement:  I have spent a total time of 60 minutes in caring for this patient on the day of the visit/encounter. >30 minutes of time was spent on: Diagnostic results, Instructions for management, Patient and family education, Importance of tx compliance, Risk factor reductions, Impressions, Counseling / Coordination of care, Documenting in the medical record, Reviewing / ordering tests, medicine, procedures  , and Communicating with other healthcare professionals .    **Please Note: This note may have been constructed using a voice recognition system**

## 2025-05-20 NOTE — ASSESSMENT & PLAN NOTE
Sodium- improved - see above  Magnesium 2.1 s/p replacement. Stable.   Phosphorus and potassium stable  Monitor electrolytes replete as necessary

## 2025-05-20 NOTE — ED NOTES
(Sandhya) requesting assistance in filing a 302 per psych recommendations as patient is now medically cleared. [facility] 302 petition filed by Dr. Lucas Bullock. 302 sent to [facility] for review. Spoke with [facility] delegate Araceli Ramirez who approved the 302 with a warrant time of 14:10. Araceli will be sending the 302 warrant page to writer.      302 Statement:  Disorganized thought process and behavior. Fast speech, significant irritability and mood lability, numerous threats to staff including to me during interview. Meets criteria for current manic episode and symptoms are unlikely to remit without treatment. Patient lacks insight into mental health condition and lacks capacity to sign a 201 for voluntary treatment. Poor sleep, flight of ideas, and poor PO intake. Inpatient psych is least restrictive venue of care for this patient.

## 2025-05-20 NOTE — ASSESSMENT & PLAN NOTE
Current sodium 134, upon admission sodium 129  Patient received 7.5 mg of tolvaptan 5/19/2025 around 4 PM. Salt tabs d/c. Pt was taken off FR at that time.   Per Epic records, appears to have intermittent history of hyponatremia since 2015.  Pt noted to be on Paxil via home medications  History of alcoholism - did not answer as to what her last drink was.   Workup:   Serum Osmo: 275, Urine Osmo: 343, Urine Na:TBC  Uric Acid: normal, AM Cortisol: normal, TSH: 4.738  Hyponatremia secondary of SSRI as well as alcohol use. Would not rule out possible change in appetite/poor oral intake.   Discussed with primary team as well as case management about discharge with 302 as soon as possible. Recommend monitoring patient for one more day since patient received medication not even 24 hours ago.   Patient on 2L fluid restriction since patient no longer receiving tolvaptan for today  Recheck sodium in AM

## 2025-05-20 NOTE — QUICK NOTE
Patient's sister called yesterday noting concern for patient's /children endangering patient care (see quick note 5/19). Discussed with nursing managers, nurses, CM --> virtual monitoring ordered, no belongings with visitors. Patient declining family call/update today.

## 2025-05-20 NOTE — ASSESSMENT & PLAN NOTE
Hx of low sodium previously  Na on admission 129   Osm studies consistent with SIADH  Prescribed paxil which may cause SIADH however patient states she was not taking   Nephro following   S/P tolvaptan x 1 on 5/19   Currently off SSRI - defer resuming to psych   Continue PO FR (2L currently)  Discussed with nephro plan on inpt monitoring additional 24 hours into tomorrow am to ensure stability/adjust regimen

## 2025-05-20 NOTE — ASSESSMENT & PLAN NOTE
Hx of Ulcerative colitis. S/P colon resection approx. 15 years ago.   Continue to monitor output.    Continue colostomy care.

## 2025-05-20 NOTE — ASSESSMENT & PLAN NOTE
Dietrich catheter placed early this am 5/20 for retention   Per chart review patient had prior urinary catheter for retention in the past   Cont flomax  Likely maintain on DC with outpatient urology f/u

## 2025-05-20 NOTE — PLAN OF CARE
Problem: Potential for Falls  Goal: Patient will remain free of falls  Description: INTERVENTIONS:  - Educate patient/family on patient safety including physical limitations  - Instruct patient to call for assistance with activity   - Consider consulting OT/PT to assist with strengthening/mobility based on AM PAC & JH-HLM score  - Consult OT/PT to assist with strengthening/mobility   - Keep Call bell within reach  - Keep bed low and locked with side rails adjusted as appropriate  - Keep care items and personal belongings within reach  - Initiate and maintain comfort rounds  - Make Fall Risk Sign visible to staff  - Offer Toileting every  Hours, in advance of need  - Initiate/Maintain alarm  - Obtain necessary fall risk management equipment:   - Apply yellow socks and bracelet for high fall risk patients  - Consider moving patient to room near nurses station  Outcome: Progressing     Problem: PAIN - ADULT  Goal: Verbalizes/displays adequate comfort level or baseline comfort level  Description: Interventions:  - Encourage patient to monitor pain and request assistance  - Assess pain using appropriate pain scale  - Administer analgesics as ordered based on type and severity of pain and evaluate response  - Implement non-pharmacological measures as appropriate and evaluate response  - Consider cultural and social influences on pain and pain management  - Notify physician/advanced practitioner if interventions unsuccessful or patient reports new pain  - Educate patient/family on pain management process including their role and importance of  reporting pain   - Provide non-pharmacologic/complimentary pain relief interventions  Outcome: Progressing     Problem: INFECTION - ADULT  Goal: Absence or prevention of progression during hospitalization  Description: INTERVENTIONS:  - Assess and monitor for signs and symptoms of infection  - Monitor lab/diagnostic results  - Monitor all insertion sites, i.e. indwelling lines,  tubes, and drains  - Monitor endotracheal if appropriate and nasal secretions for changes in amount and color  - [place] appropriate cooling/warming therapies per order  - Administer medications as ordered  - Instruct and encourage patient and family to use good hand hygiene technique  - Identify and instruct in appropriate isolation precautions for identified infection/condition  Outcome: Progressing  Goal: Absence of fever/infection during neutropenic period  Description: INTERVENTIONS:  - Monitor WBC  - Perform strict hand hygiene  - Limit to healthy visitors only  - No plants, dried, fresh or silk flowers with cowan in patient room  Outcome: Progressing     Problem: DISCHARGE PLANNING  Goal: Discharge to home or other facility with appropriate resources  Description: INTERVENTIONS:  - Identify barriers to discharge w/patient and caregiver  - Arrange for needed discharge resources and transportation as appropriate  - Identify discharge learning needs (meds, wound care, etc.)  - Arrange for interpretive services to assist at discharge as needed  - Refer to Case Management Department for coordinating discharge planning if the patient needs post-hospital services based on physician/advanced practitioner order or complex needs related to functional status, cognitive ability, or social support system  Outcome: Progressing

## 2025-05-20 NOTE — PLAN OF CARE
Problem: Potential for Falls  Goal: Patient will remain free of falls  Description: INTERVENTIONS:  - Educate patient/family on patient safety including physical limitations  - Instruct patient to call for assistance with activity   - Consider consulting OT/PT to assist with strengthening/mobility based on AM PAC & JH-HLM score  - Consult OT/PT to assist with strengthening/mobility   - Keep Call bell within reach  - Keep bed low and locked with side rails adjusted as appropriate  - Keep care items and personal belongings within reach  - Initiate and maintain comfort rounds  - Make Fall Risk Sign visible to staff  - Offer Toileting every 2 Hours, in advance of need  - Initiate/Maintain alarm  - Obtain necessary fall risk management equipment  - Apply yellow socks and bracelet for high fall risk patients  - Consider moving patient to room near nurses station  Outcome: Progressing     Problem: PAIN - ADULT  Goal: Verbalizes/displays adequate comfort level or baseline comfort level  Description: Interventions:  - Encourage patient to monitor pain and request assistance  - Assess pain using appropriate pain scale  - Administer analgesics as ordered based on type and severity of pain and evaluate response  - Implement non-pharmacological measures as appropriate and evaluate response  - Consider cultural and social influences on pain and pain management  - Notify physician/advanced practitioner if interventions unsuccessful or patient reports new pain  - Educate patient/family on pain management process including their role and importance of  reporting pain   - Provide non-pharmacologic/complimentary pain relief interventions  Outcome: Progressing     Problem: INFECTION - ADULT  Goal: Absence or prevention of progression during hospitalization  Description: INTERVENTIONS:  - Assess and monitor for signs and symptoms of infection  - Monitor lab/diagnostic results  - Monitor all insertion sites, i.e. indwelling lines,  tubes, and drains  - Monitor endotracheal if appropriate and nasal secretions for changes in amount and color  - [place] appropriate cooling/warming therapies per order  - Administer medications as ordered  - Instruct and encourage patient and family to use good hand hygiene technique  - Identify and instruct in appropriate isolation precautions for identified infection/condition  Outcome: Progressing  Goal: Absence of fever/infection during neutropenic period  Description: INTERVENTIONS:  - Monitor WBC  - Perform strict hand hygiene  - Limit to healthy visitors only  - No plants, dried, fresh or silk flowers with cowan in patient room  Outcome: Progressing     Problem: SAFETY ADULT  Goal: Patient will remain free of falls  Description: INTERVENTIONS:  - Educate patient/family on patient safety including physical limitations  - Instruct patient to call for assistance with activity   - Consider consulting OT/PT to assist with strengthening/mobility based on AM PAC & -HLM score  - Consult OT/PT to assist with strengthening/mobility   - Keep Call bell within reach  - Keep bed low and locked with side rails adjusted as appropriate  - Keep care items and personal belongings within reach  - Initiate and maintain comfort rounds  - Make Fall Risk Sign visible to staff  - Offer Toileting every 2 Hours, in advance of need  - Initiate/Maintain alarm  - Obtain necessary fall risk management equipment  - Apply yellow socks and bracelet for high fall risk patients  - Consider moving patient to room near nurses station  Outcome: Progressing  Goal: Maintain or return to baseline ADL function  Description: INTERVENTIONS:  -  Assess patient's ability to carry out ADLs; assess patient's baseline for ADL function and identify physical deficits which impact ability to perform ADLs (bathing, care of mouth/teeth, toileting, grooming, dressing, etc.)  - Assess/evaluate cause of self-care deficits   - Assess range of motion  - Assess  patient's mobility; develop plan if impaired  - Assess patient's need for assistive devices and provide as appropriate  - Encourage maximum independence but intervene and supervise when necessary  - Involve family in performance of ADLs  - Assess for home care needs following discharge   - Consider OT consult to assist with ADL evaluation and planning for discharge  - Provide patient education as appropriate  - Monitor functional capacity and physical performance, use of AM PAC & JH-HLM   - Monitor gait, balance and fatigue with ambulation    Outcome: Progressing  Goal: Maintains/Returns to pre admission functional level  Description: INTERVENTIONS:  - Perform AM-PAC 6 Click Basic Mobility/ Daily Activity assessment daily.  - Set and communicate daily mobility goal to care team and patient/family/caregiver.   - Collaborate with rehabilitation services on mobility goals if consulted  - Perform Range of Motion 3 times a day.  - Reposition patient every 3 hours.  - Dangle patient 3 times a day  - Stand patient 3 times a day  - Ambulate patient 3 times a day  - Out of bed to chair 3 times a day   - Out of bed for meals 3 times a day  - Out of bed for toileting  - Record patient progress and toleration of activity level   Outcome: Progressing     Problem: DISCHARGE PLANNING  Goal: Discharge to home or other facility with appropriate resources  Description: INTERVENTIONS:  - Identify barriers to discharge w/patient and caregiver  - Arrange for needed discharge resources and transportation as appropriate  - Identify discharge learning needs (meds, wound care, etc.)  - Arrange for interpretive services to assist at discharge as needed  - Refer to Case Management Department for coordinating discharge planning if the patient needs post-hospital services based on physician/advanced practitioner order or complex needs related to functional status, cognitive ability, or social support system  Outcome: Progressing     Problem:  Knowledge Deficit  Goal: Patient/family/caregiver demonstrates understanding of disease process, treatment plan, medications, and discharge instructions  Description: Complete learning assessment and assess knowledge base.  Interventions:  - Provide teaching at level of understanding  - Provide teaching via preferred learning methods  Outcome: Progressing     Problem: Nutrition/Hydration-ADULT  Goal: Nutrient/Hydration intake appropriate for improving, restoring or maintaining nutritional needs  Description: Monitor and assess patient's nutrition/hydration status for malnutrition. Collaborate with interdisciplinary team and initiate plan and interventions as ordered.  Monitor patient's weight and dietary intake as ordered or per policy. Utilize nutrition screening tool and intervene as necessary. Determine patient's food preferences and provide high-protein, high-caloric foods as appropriate.     INTERVENTIONS:  - Monitor oral intake, urinary output, labs, and treatment plans  - Assess nutrition and hydration status and recommend course of action  - Evaluate amount of meals eaten  - Assist patient with eating if necessary   - Allow adequate time for meals  - Recommend/ encourage appropriate diets, oral nutritional supplements, and vitamin/mineral supplements  - Order, calculate, and assess calorie counts as needed  - Recommend, monitor, and adjust tube feedings and TPN/PPN based on assessed needs  - Assess need for intravenous fluids  - Provide specific nutrition/hydration education as appropriate  - Include patient/family/caregiver in decisions related to nutrition  Outcome: Progressing

## 2025-05-20 NOTE — ASSESSMENT & PLAN NOTE
Presented for AMS and reported aggression toward family members. Recently admitted 4/4-4/8 for encephalopathy thought possible 2/2 Wernicke's vs UTI, treated with high dose thiamine due to hx of ETOH use  CTH: no acute intracranial abnormality   Not meeting SIRS criteria, no signs/sx of infection  UA negative for infection  Hyponatremic (129) on admission, see below   Neurology consulted   Currently receiving IV thiamine taper (on 250 mg daily x 5 days)  Non-focal neurologic exam  Suspected primary psychiatric illness   No further inpatient neurologic recommendations   Psychiatry consulted   Suspect acute manic episode in setting of bipolar 1 disorder  Recommending depakote 250 mg BID --> patient refusing to take this   Recommend PO zyprexa 5 mg PO or IM for agitation   Recommend initiating 302 status for inpatient psych once medically cleared

## 2025-05-20 NOTE — ED NOTES
Crisis provided patient with her rights under the 302. Patient does not appear to understand these rights.     Dr. Clemencia Mcneil upheld the [facility] 302 at 14:46. Act 77 completed.    Completed [facility] 302 placed on patients chart.

## 2025-05-20 NOTE — ASSESSMENT & PLAN NOTE
Presented for AMS and reported aggression toward family members. Recently admitted 4/4-4/8 for encephalopathy thought possible 2/2 Wernicke's vs UTI, treated with high dose thiamine due to hx of ETOH use  CTH: no acute intracranial abnormality   Not meeting SIRS criteria, no signs/sx of infection  UA negative for infection  Hyponatremic (129) on admission, see below   Neurology consulted   Continue thiamine taper  Non-focal neurologic exam  Suspected primary psychiatric illness   No further inpatient neurologic recommendations   Psychiatry consulted   Suspect acute manic episode in setting of bipolar 1 disorder  Recommending depakote 250 mg BID --> patient refusing to take this   Recommend PO zyprexa 5 mg PO or IM for agitation   Medically stable for transfer to IP BHU

## 2025-05-21 PROBLEM — E87.1 HYPONATREMIA: Status: RESOLVED | Noted: 2025-05-18 | Resolved: 2025-05-21

## 2025-05-21 LAB
ANION GAP SERPL CALCULATED.3IONS-SCNC: 8 MMOL/L (ref 4–13)
BUN SERPL-MCNC: 19 MG/DL (ref 5–25)
CALCIUM SERPL-MCNC: 9.2 MG/DL (ref 8.4–10.2)
CHLORIDE SERPL-SCNC: 97 MMOL/L (ref 96–108)
CO2 SERPL-SCNC: 30 MMOL/L (ref 21–32)
CREAT SERPL-MCNC: 0.43 MG/DL (ref 0.6–1.3)
ERYTHROCYTE [DISTWIDTH] IN BLOOD BY AUTOMATED COUNT: 12.9 % (ref 11.6–15.1)
GFR SERPL CREATININE-BSD FRML MDRD: 110 ML/MIN/1.73SQ M
GLUCOSE SERPL-MCNC: 103 MG/DL (ref 65–140)
HCT VFR BLD AUTO: 40.6 % (ref 34.8–46.1)
HGB BLD-MCNC: 13.6 G/DL (ref 11.5–15.4)
MCH RBC QN AUTO: 31 PG (ref 26.8–34.3)
MCHC RBC AUTO-ENTMCNC: 33.5 G/DL (ref 31.4–37.4)
MCV RBC AUTO: 93 FL (ref 82–98)
PLATELET # BLD AUTO: 304 THOUSANDS/UL (ref 149–390)
PMV BLD AUTO: 9.2 FL (ref 8.9–12.7)
POTASSIUM SERPL-SCNC: 3.9 MMOL/L (ref 3.5–5.3)
RBC # BLD AUTO: 4.39 MILLION/UL (ref 3.81–5.12)
SODIUM SERPL-SCNC: 135 MMOL/L (ref 135–147)
WBC # BLD AUTO: 7.35 THOUSAND/UL (ref 4.31–10.16)

## 2025-05-21 PROCEDURE — 80048 BASIC METABOLIC PNL TOTAL CA: CPT | Performed by: INTERNAL MEDICINE

## 2025-05-21 PROCEDURE — 99232 SBSQ HOSP IP/OBS MODERATE 35: CPT | Performed by: INTERNAL MEDICINE

## 2025-05-21 PROCEDURE — 85027 COMPLETE CBC AUTOMATED: CPT | Performed by: INTERNAL MEDICINE

## 2025-05-21 PROCEDURE — 99232 SBSQ HOSP IP/OBS MODERATE 35: CPT | Performed by: PHYSICIAN ASSISTANT

## 2025-05-21 RX ADMIN — TAMSULOSIN HYDROCHLORIDE 0.4 MG: 0.4 CAPSULE ORAL at 17:11

## 2025-05-21 RX ADMIN — DIVALPROEX SODIUM 250 MG: 125 CAPSULE, COATED PELLETS ORAL at 09:57

## 2025-05-21 RX ADMIN — OLANZAPINE 5 MG: 5 TABLET, FILM COATED ORAL at 04:55

## 2025-05-21 RX ADMIN — THIAMINE HYDROCHLORIDE 250 MG: 100 INJECTION, SOLUTION INTRAMUSCULAR; INTRAVENOUS at 09:59

## 2025-05-21 NOTE — PROGRESS NOTES
Progress Note - Nephrology   Name: Berta Smart 59 y.o. female I MRN: [id number]  Unit/Bed#: -01 I Date of Admission: 5/18/2025   Date of Service: 5/21/2025 I Hospital Day: 3    Assessment & Plan  Urinary retention  Dietrich catheter placed 5/20  Remains in place  Monitor I&O  Stable for discharge per nephrology viewpoint  At high risk for electrolyte imbalance  Sodium- remains stable  Magnesium 2.1 - remains stable  Phosphorus and potassium stable  Alcoholism (HCC)  On CIWA  Per primary team  Encephalopathy  Psychiatric consulted, following. Pt for pending d/c today. 302 completed - awaiting bed per primary team   CT head without: Negative  Recently hospitalized 4/4-4/8 for encephalopathy.       Subjective   Pt denies pain at this time. No SOB or CP.       Objective :  Temp:  [97.7 °F (36.5 °C)-98.8 °F (37.1 °C)] 98.8 °F (37.1 °C)  HR:  [87-99] 94  BP: ()/(63-79) 124/79  SpO2:  [88 %-94 %] 91 %  O2 Device: None (Room air)    Current Weight: Weight - Scale: 38.1 kg (83 lb 15.9 oz)  First Weight: Weight - Scale: 38.1 kg (83 lb 15.9 oz)  I/O         05/19 0701  05/20 0700 05/20 0701  05/21 0700 05/21 0701  05/22 0700    P.O. 660 240 220    IV Piggyback 250      Total Intake(mL/kg) 910 (23.9) 240 (6.3) 220 (5.8)    Urine (mL/kg/hr) 2500 (2.7) 1950 (2.1)     Total Output 2500 1950     Net -1590 -1710 +220                 Physical Exam  Vitals and nursing note reviewed. Exam conducted with a chaperone present (On virtual 1:1).     Cardiovascular:      Rate and Rhythm: Normal rate.   Pulmonary:      Effort: Pulmonary effort is normal. No respiratory distress.      Breath sounds: Normal breath sounds. No wheezing or rales.   Abdominal:      General: There is no distension.      Palpations: Abdomen is soft.     Musculoskeletal:      Right lower leg: No edema.      Left lower leg: No edema.     Skin:     General: Skin is warm and dry.      Capillary Refill: Capillary refill takes less than 2 seconds.  "    Neurological:      Mental Status: She is alert. Mental status is at baseline.       Medications:  Current Medications[1]      Lab Results: I have reviewed the following results:  Results from last 7 days   Lab Units 05/21/25  0256 05/20/25  0444 05/19/25  0719 05/18/25  0954   WBC Thousand/uL 7.35 7.95 6.05 7.10   HEMOGLOBIN g/dL 13.6 14.6 13.6 14.8   HEMATOCRIT % 40.6 42.8 40.0 43.4   PLATELETS Thousands/uL 304 326 276 321   POTASSIUM mmol/L 3.9 4.2 3.9 4.3   CHLORIDE mmol/L 97 99 96 89*   CO2 mmol/L 30 27 26 32   BUN mg/dL 19 8 5 8   CREATININE mg/dL 0.43* 0.51* 0.42* 0.47*   CALCIUM mg/dL 9.2 9.2 8.7 9.7   MAGNESIUM mg/dL  --  2.1 1.7* 1.7*   PHOSPHORUS mg/dL  --   --  3.7 4.0   ALBUMIN g/dL  --  3.8 3.4* 4.2       Administrative Statements     Portions of the record may have been created with voice recognition software. Occasional wrong word or \"sound a like\" substitutions may have occurred due to the inherent limitations of voice recognition software. Read the chart carefully and recognize, using context, where substitutions have occurred.If you have any questions, please contact the dictating provider.         [1]   Current Facility-Administered Medications:     acetaminophen (TYLENOL) tablet 650 mg, 650 mg, Oral, Q6H PRN, Swetha Baker MD    aluminum-magnesium hydroxide-simethicone (MAALOX) oral suspension 30 mL, 30 mL, Oral, Q6H PRN, Swetha Baker MD    divalproex sodium (DEPAKOTE SPRINKLE) capsule 250 mg, 250 mg, Oral, Q12H [facility], Yue Chavarria PA-C, 250 mg at 05/21/25 0957    Fluticasone Furoate-Vilanterol 100-25 mcg/actuation 1 puff, 1 puff, Inhalation, Daily, Swetha Baker MD, 1 puff at 05/19/25 1208    folic acid (FOLVITE) tablet 1 mg, 1 mg, Oral, Daily, Swetha Baker MD, 1 mg at 05/19/25 1208    melatonin tablet 3 mg, 3 mg, Oral, HS, Swetha Baker MD, 3 mg at 05/20/25 2151    multivitamin-minerals (CENTRUM) tablet 1 tablet, 1 tablet, Oral, Daily, Swetha Baker MD, 1 tablet at " 05/19/25 1208    OLANZapine (ZyPREXA) IM injection 5 mg, 5 mg, Intramuscular, Q6H PRN **OR** OLANZapine (ZyPREXA) tablet 5 mg, 5 mg, Oral, Q6H PRN, Yue Chavarria PA-C, 5 mg at 05/21/25 0455    ondansetron (ZOFRAN) injection 4 mg, 4 mg, Intravenous, Q4H PRN, Swetha Baker MD    polyethylene glycol (MIRALAX) packet 17 g, 17 g, Oral, Daily PRN, Swetha Baker MD    tamsulosin (FLOMAX) capsule 0.4 mg, 0.4 mg, Oral, Daily With Dinner, Yue Chavarria PA-C, 0.4 mg at 05/20/25 1823    [COMPLETED] thiamine (VITAMIN B1) 500 mg in sodium chloride 0.9 % 50 mL IVPB, 500 mg, Intravenous, TID, Last Rate: 100 mL/hr at 05/20/25 0912, 500 mg at 05/20/25 0912 **FOLLOWED BY** thiamine (VITAMIN B1) 250 mg in sodium chloride 0.9 % 50 mL IVPB, 250 mg, Intravenous, Daily, Last Rate: 100 mL/hr at 05/21/25 0959, 250 mg at 05/21/25 0959 **FOLLOWED BY** [DISCONTINUED] thiamine (VITAMIN B1) 100 mg in sodium chloride 0.9 % 50 mL IVPB, 100 mg, Intravenous, TID **FOLLOWED BY** [DISCONTINUED] thiamine (VITAMIN B1) 100 mg in sodium chloride 0.9 % 50 mL IVPB, 100 mg, Intravenous, Daily, Swetha Baker MD

## 2025-05-21 NOTE — ASSESSMENT & PLAN NOTE
Hx of low sodium previously  Na on admission 129   Osm studies consistent with SIADH  Prescribed paxil which may cause SIADH however patient states she was not taking   Nephro following   S/P tolvaptan x 1 on 5/19   Currently off SSRI - defer resuming to psych   Continue PO FR   Medically stable for transfer to patient BHU pending bed availability

## 2025-05-21 NOTE — ASSESSMENT & PLAN NOTE
Psychiatric consulted, following. Pt for pending d/c today. 302 completed - awaiting bed per primary team   CT head without: Negative  Recently hospitalized 4/4-4/8 for encephalopathy.
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
## 2025-05-21 NOTE — PROGRESS NOTES
Progress Note - Hospitalist   Name: Berta Smart 59 y.o. female I MRN: [id number]  Unit/Bed#: -01 I Date of Admission: 5/18/2025   Date of Service: 5/21/2025 I Hospital Day: 3    Assessment & Plan  Encephalopathy  Presented for AMS and reported aggression toward family members. Recently admitted 4/4-4/8 for encephalopathy thought possible 2/2 Wernicke's vs UTI, treated with high dose thiamine due to hx of ETOH use  CTH: no acute intracranial abnormality   Not meeting SIRS criteria, no signs/sx of infection  UA negative for infection  Hyponatremic (129) on admission, see below   Neurology consulted   Continue thiamine  Non-focal neurologic exam  Suspected primary psychiatric illness   No further inpatient neurologic recommendations   Psychiatry consulted   Suspect acute manic episode in setting of bipolar 1 disorder  Recommending depakote 250 mg BID --> patient refusing to take this   Recommend PO zyprexa 5 mg PO or IM for agitation   302 completed 5/20  Patient is medically stable for discharge to IP BHU pending bed availability  Hyponatremia (Resolved: 5/21/2025)  Hx of low sodium previously  Na on admission 129   Osm studies consistent with SIADH  Prescribed paxil which may cause SIADH however patient states she was not taking   Nephro following   S/P tolvaptan x 1 on 5/19   Currently off SSRI - defer resuming to psych   Continue PO FR   Medically stable for transfer to patient BHU pending bed availability  Colostomy status (HCC)  Hx of Ulcerative colitis. S/P colon resection approx. 15 years ago.   Continue to monitor output.    Continue colostomy care.    Alcoholism (HCC)  On CIWA protocol  On thiamine taper  Continue to encourage continued sensation.   Severe protein-calorie malnutrition (HCC)  Malnutrition Findings:   Adult Malnutrition type: Chronic illness  BMI Findings:  Adult BMI Classifications: Underweight < 18.5  Body mass index is 13.16 kg/m².     Continue encourage adequate oral hydration and  "nutrition.  Urinary retention  Dietrich catheter placed early am 5/20 for retention   Per chart review patient had prior urinary catheter for retention in the past   Cont flomax  Likely maintain on DC with outpatient urology f/u    VTE Pharmacologic Prophylaxis: VTE Score: 1 Low Risk (Score 0-2) - Encourage Ambulation.    Mobility:   Basic Mobility Inpatient Raw Score: 23  JH-HLM Goal: 7: Walk 25 feet or more  JH-HLM Achieved: 1: Laying in bed  JH-HLM Goal NOT achieved. Continue with multidisciplinary rounding and encourage appropriate mobility to improve upon JH-HLM goals.    Patient Centered Rounds: I performed bedside rounds with nursing staff today.   Discussions with Specialists or Other Care Team Provider: CM    Education and Discussions with Family / Patient: Patient declined call to .     Current Length of Stay: 3 day(s)  Current Patient Status: Inpatient   Certification Statement: The patient will continue to require additional inpatient hospital stay due to IP BHU Placement  Discharge Plan: Anticipate discharge later today or tomorrow to inpatient psych.    Code Status: Level 1 - Full Code    Subjective   Patient initially pleasant on interview.  Denies any complaints.  Requesting the \"red and green dot\" on her breakfast tray and to \"initiate protocol right away\".  Also reports if this writer \"messes with the food, I will knock your head off your body and watch it bounce around the room\".      Objective :  Temp:  [97.7 °F (36.5 °C)-98.8 °F (37.1 °C)] 98.8 °F (37.1 °C)  HR:  [87-99] 94  BP: ()/(63-79) 124/79  SpO2:  [88 %-94 %] 91 %  O2 Device: None (Room air)    Body mass index is 13.16 kg/m².     Input and Output Summary (last 24 hours):     Intake/Output Summary (Last 24 hours) at 5/21/2025 0912  Last data filed at 5/21/2025 0526  Gross per 24 hour   Intake 240 ml   Output 1650 ml   Net -1410 ml       Physical Exam  Vitals and nursing note reviewed.   Constitutional:       Appearance: " She is underweight.      Comments: No acute distress   HENT:      Head: Normocephalic.     Eyes:      General: No scleral icterus.     Extraocular Movements: Extraocular movements intact.      Conjunctiva/sclera: Conjunctivae normal.       Cardiovascular:      Rate and Rhythm: Normal rate and regular rhythm.   Pulmonary:      Effort: Pulmonary effort is normal.      Breath sounds: Normal breath sounds. No wheezing, rhonchi or rales.   Abdominal:      General: Bowel sounds are normal.      Palpations: Abdomen is soft.      Tenderness: There is no abdominal tenderness. There is no guarding or rebound.     Musculoskeletal:         General: No swelling, tenderness or deformity.      Cervical back: Normal range of motion.      Comments: Able to move upper/lower ext bilaterally, no edema     Skin:     General: Skin is warm and dry.     Neurological:      General: No focal deficit present.      Mental Status: She is alert.     Psychiatric:         Mood and Affect: Affect is angry.           Lines/Drains:  Lines/Drains/Airways       Active Status       Name Placement date Placement time Site Days    Ileostomy LLQ 04/07/25  --  LLQ  44    Urethral Catheter Latex 16 Fr. 05/20/25  0436  Latex  1                  Urinary Catheter:  Goal for removal: N/A- Discharging with Dietrich                 Lab Results: I have reviewed the following results:   Results from last 7 days   Lab Units 05/21/25  0256 05/19/25  0719 05/18/25  0954   WBC Thousand/uL 7.35   < > 7.10   HEMOGLOBIN g/dL 13.6   < > 14.8   HEMATOCRIT % 40.6   < > 43.4   PLATELETS Thousands/uL 304   < > 321   LYMPHO PCT %  --   --  38   MONO PCT %  --   --  11   EOS PCT %  --   --  7*    < > = values in this interval not displayed.     Results from last 7 days   Lab Units 05/21/25  0256 05/20/25  0444   SODIUM mmol/L 135 134*   POTASSIUM mmol/L 3.9 4.2   CHLORIDE mmol/L 97 99   CO2 mmol/L 30 27   BUN mg/dL 19 8   CREATININE mg/dL 0.43* 0.51*   ANION GAP mmol/L 8 8   CALCIUM  mg/dL 9.2 9.2   ALBUMIN g/dL  --  3.8   TOTAL BILIRUBIN mg/dL  --  0.40   ALK PHOS U/L  --  62   ALT U/L  --  8   AST U/L  --  11*   GLUCOSE RANDOM mg/dL 103 106                 Results from last 7 days   Lab Units 05/18/25  0954   LACTIC ACID mmol/L 1.4   PROCALCITONIN ng/ml <0.05       Recent Cultures (last 7 days):         Imaging Results Review: I reviewed radiology reports from this admission including: CT head.  Other Study Results Review: No additional pertinent studies reviewed.    Last 24 Hours Medication List:     Current Facility-Administered Medications:     acetaminophen (TYLENOL) tablet 650 mg, Q6H PRN    aluminum-magnesium hydroxide-simethicone (MAALOX) oral suspension 30 mL, Q6H PRN    divalproex sodium (DEPAKOTE SPRINKLE) capsule 250 mg, Q12H SHWETA    Fluticasone Furoate-Vilanterol 100-25 mcg/actuation 1 puff, Daily    folic acid (FOLVITE) tablet 1 mg, Daily    melatonin tablet 3 mg, HS    multivitamin-minerals (CENTRUM) tablet 1 tablet, Daily    OLANZapine (ZyPREXA) IM injection 5 mg, Q6H PRN **OR** OLANZapine (ZyPREXA) tablet 5 mg, Q6H PRN    ondansetron (ZOFRAN) injection 4 mg, Q4H PRN    polyethylene glycol (MIRALAX) packet 17 g, Daily PRN    tamsulosin (FLOMAX) capsule 0.4 mg, Daily With Dinner    [COMPLETED] thiamine (VITAMIN B1) 500 mg in sodium chloride 0.9 % 50 mL IVPB, TID, Last Rate: 500 mg (05/20/25 0912) **FOLLOWED BY** thiamine (VITAMIN B1) 250 mg in sodium chloride 0.9 % 50 mL IVPB, Daily **FOLLOWED BY** [DISCONTINUED] thiamine (VITAMIN B1) 100 mg in sodium chloride 0.9 % 50 mL IVPB, TID **FOLLOWED BY** [DISCONTINUED] thiamine (VITAMIN B1) 100 mg in sodium chloride 0.9 % 50 mL IVPB, Daily    Administrative Statements   Today, Patient Was Seen By: Shayla Mukherjee PA-C  I have spent a total time of 35 minutes in caring for this patient on the day of the visit/encounter including Diagnostic results, Instructions for management, Patient and family education, Importance of tx compliance,  Risk factor reductions, Impressions, Counseling / Coordination of care, Documenting in the medical record, Reviewing/placing orders in the medical record (including tests, medications, and/or procedures), and Obtaining or reviewing history  .    **Please Note: This note may have been constructed using a voice recognition system.**

## 2025-05-21 NOTE — PLAN OF CARE
Problem: Potential for Falls  Goal: Patient will remain free of falls  Description: INTERVENTIONS:  - Educate patient/family on patient safety including physical limitations  - Instruct patient to call for assistance with activity   - Consider consulting OT/PT to assist with strengthening/mobility based on AM PAC & JH-HLM score  - Consult OT/PT to assist with strengthening/mobility   - Keep Call bell within reach  - Keep bed low and locked with side rails adjusted as appropriate  - Keep care items and personal belongings within reach  - Initiate and maintain comfort rounds  - Make Fall Risk Sign visible to staff  - Offer Toileting every  Hours, in advance of need  - Initiate/Maintain alarm  - Obtain necessary fall risk management equipment:   - Apply yellow socks and bracelet for high fall risk patients  - Consider moving patient to room near nurses station  Outcome: Progressing     Problem: PAIN - ADULT  Goal: Verbalizes/displays adequate comfort level or baseline comfort level  Description: Interventions:  - Encourage patient to monitor pain and request assistance  - Assess pain using appropriate pain scale  - Administer analgesics as ordered based on type and severity of pain and evaluate response  - Implement non-pharmacological measures as appropriate and evaluate response  - Consider cultural and social influences on pain and pain management  - Notify physician/advanced practitioner if interventions unsuccessful or patient reports new pain  - Educate patient/family on pain management process including their role and importance of  reporting pain   - Provide non-pharmacologic/complimentary pain relief interventions  Outcome: Progressing     Problem: INFECTION - ADULT  Goal: Absence or prevention of progression during hospitalization  Description: INTERVENTIONS:  - Assess and monitor for signs and symptoms of infection  - Monitor lab/diagnostic results  - Monitor all insertion sites, i.e. indwelling lines,  tubes, and drains  - Monitor endotracheal if appropriate and nasal secretions for changes in amount and color  - Rickman appropriate cooling/warming therapies per order  - Administer medications as ordered  - Instruct and encourage patient and family to use good hand hygiene technique  - Identify and instruct in appropriate isolation precautions for identified infection/condition  Outcome: Progressing  Goal: Absence of fever/infection during neutropenic period  Description: INTERVENTIONS:  - Monitor WBC  - Perform strict hand hygiene  - Limit to healthy visitors only  - No plants, dried, fresh or silk flowers with cowan in patient room  Outcome: Progressing     Problem: SAFETY ADULT  Goal: Patient will remain free of falls  Description: INTERVENTIONS:  - Educate patient/family on patient safety including physical limitations  - Instruct patient to call for assistance with activity   - Consider consulting OT/PT to assist with strengthening/mobility based on AM PAC & -HLM score  - Consult OT/PT to assist with strengthening/mobility   - Keep Call bell within reach  - Keep bed low and locked with side rails adjusted as appropriate  - Keep care items and personal belongings within reach  - Initiate and maintain comfort rounds  - Make Fall Risk Sign visible to staff  - Offer Toileting every  Hours, in advance of need  - Initiate/Maintain alarm  - Obtain necessary fall risk management equipment:   - Apply yellow socks and bracelet for high fall risk patients  - Consider moving patient to room near nurses station  Outcome: Progressing  Goal: Maintain or return to baseline ADL function  Description: INTERVENTIONS:  -  Assess patient's ability to carry out ADLs; assess patient's baseline for ADL function and identify physical deficits which impact ability to perform ADLs (bathing, care of mouth/teeth, toileting, grooming, dressing, etc.)  - Assess/evaluate cause of self-care deficits   - Assess range of motion  - Assess  patient's mobility; develop plan if impaired  - Assess patient's need for assistive devices and provide as appropriate  - Encourage maximum independence but intervene and supervise when necessary  - Involve family in performance of ADLs  - Assess for home care needs following discharge   - Consider OT consult to assist with ADL evaluation and planning for discharge  - Provide patient education as appropriate  - Monitor functional capacity and physical performance, use of AM PAC & JH-HLM   - Monitor gait, balance and fatigue with ambulation    Outcome: Progressing  Goal: Maintains/Returns to pre admission functional level  Description: INTERVENTIONS:  - Perform AM-PAC 6 Click Basic Mobility/ Daily Activity assessment daily.  - Set and communicate daily mobility goal to care team and patient/family/caregiver.   - Collaborate with rehabilitation services on mobility goals if consulted  - Perform Range of Motion times a day.  - Reposition patient every  hours.  - Dangle patient  times a day  - Stand patient  times a day  - Ambulate patient  times a day  - Out of bed to chair  times a day   - Out of bed for meals  times a day  - Out of bed for toileting  - Record patient progress and toleration of activity level   Outcome: Progressing     Problem: DISCHARGE PLANNING  Goal: Discharge to home or other facility with appropriate resources  Description: INTERVENTIONS:  - Identify barriers to discharge w/patient and caregiver  - Arrange for needed discharge resources and transportation as appropriate  - Identify discharge learning needs (meds, wound care, etc.)  - Arrange for interpretive services to assist at discharge as needed  - Refer to Case Management Department for coordinating discharge planning if the patient needs post-hospital services based on physician/advanced practitioner order or complex needs related to functional status, cognitive ability, or social support system  Outcome: Progressing     Problem: Knowledge  Deficit  Goal: Patient/family/caregiver demonstrates understanding of disease process, treatment plan, medications, and discharge instructions  Description: Complete learning assessment and assess knowledge base.  Interventions:  - Provide teaching at level of understanding  - Provide teaching via preferred learning methods  Outcome: Progressing     Problem: Nutrition/Hydration-ADULT  Goal: Nutrient/Hydration intake appropriate for improving, restoring or maintaining nutritional needs  Description: Monitor and assess patient's nutrition/hydration status for malnutrition. Collaborate with interdisciplinary team and initiate plan and interventions as ordered.  Monitor patient's weight and dietary intake as ordered or per policy. Utilize nutrition screening tool and intervene as necessary. Determine patient's food preferences and provide high-protein, high-caloric foods as appropriate.     INTERVENTIONS:  - Monitor oral intake, urinary output, labs, and treatment plans  - Assess nutrition and hydration status and recommend course of action  - Evaluate amount of meals eaten  - Assist patient with eating if necessary   - Allow adequate time for meals  - Recommend/ encourage appropriate diets, oral nutritional supplements, and vitamin/mineral supplements  - Order, calculate, and assess calorie counts as needed  - Recommend, monitor, and adjust tube feedings and TPN/PPN based on assessed needs  - Assess need for intravenous fluids  - Provide specific nutrition/hydration education as appropriate  - Include patient/family/caregiver in decisions related to nutrition  Outcome: Progressing     Problem: Prexisting or High Potential for Compromised Skin Integrity  Goal: Skin integrity is maintained or improved  Description: INTERVENTIONS:  - Identify patients at risk for skin breakdown  - Assess and monitor skin integrity including under and around medical devices   - Assess and monitor nutrition and hydration status  - Monitor  labs  - Assess for incontinence   - Turn and reposition patient  - Assist with mobility/ambulation  - Relieve pressure over canelo prominences   - Avoid friction and shearing  - Provide appropriate hygiene as needed including keeping skin clean and dry  - Evaluate need for skin moisturizer/barrier cream  - Collaborate with interdisciplinary team  - Patient/family teaching  - Consider wound care consult    Assess:  - Review William scale daily  - Clean and moisturize skin every  - Inspect skin when repositioning, toileting, and assisting with ADLS  - Assess under medical devices such as  every   - Assess extremities for adequate circulation and sensation     Bed Management:  - Have minimal linens on bed & keep smooth, unwrinkled  - Change linens as needed when moist or perspiring  - Avoid sitting or lying in one position for more than  hours while in bed?Keep HOB at degrees   - Toileting:  - Offer bedside commode  - Assess for incontinence every   - Use incontinent care products after each incontinent episode such as     Activity:  - Mobilize patie times a day  - Encourage activity and walks on unit  - Encourage or provide ROM exercises   - Turn and reposition patient every  Hours  - Use appropriate equipment to lift or move patient in bed  - Instruct/ Assist with weight shifting every  when out of bed in chair  - Consider limitation of chair time  hour intervals    Skin Care:  - Avoid use of baby powder, tape, friction and shearing, hot water or constrictive clothing  - Relieve pressure over bony prominences using   - Do not massage red bony areas    Next Steps:  - Teach patient strategies to minimize risks such as   - Consider consults to  interdisciplinary teams such as   Outcome: Progressing

## 2025-05-21 NOTE — PLAN OF CARE
Problem: Potential for Falls  Goal: Patient will remain free of falls  Description: INTERVENTIONS:  - Educate patient/family on patient safety including physical limitations  - Instruct patient to call for assistance with activity   - Consider consulting OT/PT to assist with strengthening/mobility based on AM PAC & JH-HLM score  - Consult OT/PT to assist with strengthening/mobility   - Keep Call bell within reach  - Keep bed low and locked with side rails adjusted as appropriate  - Keep care items and personal belongings within reach  - Initiate and maintain comfort rounds  - Make Fall Risk Sign visible to staff  - Offer Toileting every 2 Hours, in advance of need  - Initiate/Maintain alarm  - Obtain necessary fall risk management equipment  - Apply yellow socks and bracelet for high fall risk patients  - Consider moving patient to room near nurses station  Outcome: Progressing     Problem: PAIN - ADULT  Goal: Verbalizes/displays adequate comfort level or baseline comfort level  Description: Interventions:  - Encourage patient to monitor pain and request assistance  - Assess pain using appropriate pain scale  - Administer analgesics as ordered based on type and severity of pain and evaluate response  - Implement non-pharmacological measures as appropriate and evaluate response  - Consider cultural and social influences on pain and pain management  - Notify physician/advanced practitioner if interventions unsuccessful or patient reports new pain  - Educate patient/family on pain management process including their role and importance of  reporting pain   - Provide non-pharmacologic/complimentary pain relief interventions  Outcome: Progressing     Problem: INFECTION - ADULT  Goal: Absence or prevention of progression during hospitalization  Description: INTERVENTIONS:  - Assess and monitor for signs and symptoms of infection  - Monitor lab/diagnostic results  - Monitor all insertion sites, i.e. indwelling lines,  tubes, and drains  - Monitor endotracheal if appropriate and nasal secretions for changes in amount and color  - [place] appropriate cooling/warming therapies per order  - Administer medications as ordered  - Instruct and encourage patient and family to use good hand hygiene technique  - Identify and instruct in appropriate isolation precautions for identified infection/condition  Outcome: Progressing  Goal: Absence of fever/infection during neutropenic period  Description: INTERVENTIONS:  - Monitor WBC  - Perform strict hand hygiene  - Limit to healthy visitors only  - No plants, dried, fresh or silk flowers with cowan in patient room  Outcome: Progressing     Problem: SAFETY ADULT  Goal: Patient will remain free of falls  Description: INTERVENTIONS:  - Educate patient/family on patient safety including physical limitations  - Instruct patient to call for assistance with activity   - Consider consulting OT/PT to assist with strengthening/mobility based on AM PAC & -HLM score  - Consult OT/PT to assist with strengthening/mobility   - Keep Call bell within reach  - Keep bed low and locked with side rails adjusted as appropriate  - Keep care items and personal belongings within reach  - Initiate and maintain comfort rounds  - Make Fall Risk Sign visible to staff  - Offer Toileting every 2 Hours, in advance of need  - Initiate/Maintain alarm  - Obtain necessary fall risk management equipment  - Apply yellow socks and bracelet for high fall risk patients  - Consider moving patient to room near nurses station  Outcome: Progressing  Goal: Maintain or return to baseline ADL function  Description: INTERVENTIONS:  -  Assess patient's ability to carry out ADLs; assess patient's baseline for ADL function and identify physical deficits which impact ability to perform ADLs (bathing, care of mouth/teeth, toileting, grooming, dressing, etc.)  - Assess/evaluate cause of self-care deficits   - Assess range of motion  - Assess  patient's mobility; develop plan if impaired  - Assess patient's need for assistive devices and provide as appropriate  - Encourage maximum independence but intervene and supervise when necessary  - Involve family in performance of ADLs  - Assess for home care needs following discharge   - Consider OT consult to assist with ADL evaluation and planning for discharge  - Provide patient education as appropriate  - Monitor functional capacity and physical performance, use of AM PAC & JH-HLM   - Monitor gait, balance and fatigue with ambulation    Outcome: Progressing  Goal: Maintains/Returns to pre admission functional level  Description: INTERVENTIONS:  - Perform AM-PAC 6 Click Basic Mobility/ Daily Activity assessment daily.  - Set and communicate daily mobility goal to care team and patient/family/caregiver.   - Collaborate with rehabilitation services on mobility goals if consulted  - Perform Range of Motion 3 times a day.  - Reposition patient every 3 hours.  - Dangle patient 3 times a day  - Stand patient 3 times a day  - Ambulate patient 3 times a day  - Out of bed to chair 3 times a day   - Out of bed for meals 3 times a day  - Out of bed for toileting  - Record patient progress and toleration of activity level   Outcome: Progressing     Problem: DISCHARGE PLANNING  Goal: Discharge to home or other facility with appropriate resources  Description: INTERVENTIONS:  - Identify barriers to discharge w/patient and caregiver  - Arrange for needed discharge resources and transportation as appropriate  - Identify discharge learning needs (meds, wound care, etc.)  - Arrange for interpretive services to assist at discharge as needed  - Refer to Case Management Department for coordinating discharge planning if the patient needs post-hospital services based on physician/advanced practitioner order or complex needs related to functional status, cognitive ability, or social support system  Outcome: Progressing     Problem:  Knowledge Deficit  Goal: Patient/family/caregiver demonstrates understanding of disease process, treatment plan, medications, and discharge instructions  Description: Complete learning assessment and assess knowledge base.  Interventions:  - Provide teaching at level of understanding  - Provide teaching via preferred learning methods  Outcome: Progressing     Problem: Nutrition/Hydration-ADULT  Goal: Nutrient/Hydration intake appropriate for improving, restoring or maintaining nutritional needs  Description: Monitor and assess patient's nutrition/hydration status for malnutrition. Collaborate with interdisciplinary team and initiate plan and interventions as ordered.  Monitor patient's weight and dietary intake as ordered or per policy. Utilize nutrition screening tool and intervene as necessary. Determine patient's food preferences and provide high-protein, high-caloric foods as appropriate.     INTERVENTIONS:  - Monitor oral intake, urinary output, labs, and treatment plans  - Assess nutrition and hydration status and recommend course of action  - Evaluate amount of meals eaten  - Assist patient with eating if necessary   - Allow adequate time for meals  - Recommend/ encourage appropriate diets, oral nutritional supplements, and vitamin/mineral supplements  - Order, calculate, and assess calorie counts as needed  - Recommend, monitor, and adjust tube feedings and TPN/PPN based on assessed needs  - Assess need for intravenous fluids  - Provide specific nutrition/hydration education as appropriate  - Include patient/family/caregiver in decisions related to nutrition  Outcome: Progressing

## 2025-05-21 NOTE — ASSESSMENT & PLAN NOTE
Presented for AMS and reported aggression toward family members. Recently admitted 4/4-4/8 for encephalopathy thought possible 2/2 Wernicke's vs UTI, treated with high dose thiamine due to hx of ETOH use  CTH: no acute intracranial abnormality   Not meeting SIRS criteria, no signs/sx of infection  UA negative for infection  Hyponatremic (129) on admission, see below   Neurology consulted   Continue thiamine  Non-focal neurologic exam  Suspected primary psychiatric illness   No further inpatient neurologic recommendations   Psychiatry consulted   Suspect acute manic episode in setting of bipolar 1 disorder  Recommending depakote 250 mg BID --> patient refusing to take this   Recommend PO zyprexa 5 mg PO or IM for agitation   302 completed 5/20  Patient is medically stable for discharge to IP BHU pending bed availability

## 2025-05-21 NOTE — CASE MANAGEMENT
Case Management Discharge Planning Note    Patient name Berta Smart  Location /-01 MRN [id number]  : 1965 Date 2025       Current Admission Date: 2025  Current Admission Diagnosis:Encephalopathy   Patient Active Problem List    Diagnosis Date Noted    At high risk for electrolyte imbalance 2025    Bipolar 1 disorder with moderate shahida (HCC) 2025    Hyponatremia 2025    Dysuria 2025    Adjustment disorder with mixed disturbance of emotions and conduct 2025    Severe protein-calorie malnutrition (HCC) 2025    Encephalopathy 2025    Urinary retention 2025    Colostomy in place (HCC) 2025    SOB (shortness of breath) 2020    Abnormal CXR 2020    Colostomy status (HCC) 2020    KRAIG (generalized anxiety disorder) 2020    Current mild episode of major depressive disorder without prior episode ([facility]) 2020    Tobacco use disorder 2020    Alcoholism ([facility]) 2020    BMI less than 19,adult 2020      LOS (days): 3  Geometric Mean LOS (GMLOS) (days):   Days to GMLOS:     OBJECTIVE:  Risk of Unplanned Readmission Score: 15.95         Current admission status: Inpatient   Preferred Pharmacy:   [facility] [address] [place] [address]  Phone: [phone] Fax: [phone]    Primary Care Provider: Isabella Turner MD    Primary Insurance: [employer]  Secondary Insurance:     DISCHARGE DETAILS:     302 completed yesterday. Patient medically cleared for discharge today.   Spoke with Ayesha of [facility] intake and referral to [phone] and e mailed completed 302 to her at [email or web address]. Wait bed availability.

## 2025-05-21 NOTE — NURSING NOTE
"Pts sister Homa called this RN expressing concerns over Facebook post that patient's daughter had posted conveying that her mother is \"being admitted to psychiatric facility\" also expressed concerned about daughter's consent for the patient. Pt sister also expressed concern that she is not being updated about patient care. Informed Homa that this RN will inform day shift to update.   "

## 2025-05-21 NOTE — CASE MANAGEMENT
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 conduct 2025    Severe protein-calorie malnutrition (HCC) 2025    Encephalopathy 2025    Urinary retention 2025    Colostomy in place (HCC) 2025    SOB (shortness of breath) 2020    Abnormal CXR 2020    Colostomy status (HCC) 2020    KRAIG (generalized anxiety disorder) 2020    Current mild episode of major depressive disorder without prior episode (HCC) 2020    Tobacco use disorder 2020    Alcoholism ([facility]) 2020    BMI less than 19,adult 2020      LOS (days): 3  Geometric Mean LOS (GMLOS) (days):   Days to GMLOS:     OBJECTIVE:  Risk of Unplanned Readmission Score: 15.82         Current admission status: Inpatient   Preferred Pharmacy:   [facility] [address] [place] [address] - [address] [place] [address]  Phone: [phone] Fax: [phone]    Primary Care Provider: Isabella Turner MD    Primary Insurance: [employer]  Secondary Insurance:     DISCHARGE DETAILS:     Received call back from [facility] in [place] at [phone] and after review by their Medical director, patient is too medically complex for their unit and could not offer a bed.

## 2025-05-21 NOTE — ASSESSMENT & PLAN NOTE
Dietrich catheter placed 5/20  Remains in place  Monitor I&O  Stable for discharge per nephrology viewpoint

## 2025-05-21 NOTE — ASSESSMENT & PLAN NOTE
Dietrich catheter placed early am 5/20 for retention   Per chart review patient had prior urinary catheter for retention in the past   Cont flomax  Likely maintain on DC with outpatient urology f/u

## 2025-05-21 NOTE — ASSESSMENT & PLAN NOTE
Malnutrition Findings:   Adult Malnutrition type: Chronic illness  BMI Findings:  Adult BMI Classifications: Underweight < 18.5  Body mass index is 13.16 kg/m².     Continue encourage adequate oral hydration and nutrition.

## 2025-05-21 NOTE — CASE MANAGEMENT
Case Management Progress Note    Patient name Berta Smart  Location /-01 MRN [id number]  : 1965 Date 2025       LOS (days): 3  Geometric Mean LOS (GMLOS) (days):   Days to GMLOS:        OBJECTIVE:        Current admission status: Inpatient  Preferred Pharmacy:   [facility] [address], [place] [address]  [address]  [facility] [address]  Phone: [phone] Fax: [phone]    Primary Care Provider: Isabella Turner MD    Primary Insurance: [employer]  Secondary Insurance:     PROGRESS NOTE:    [facility] at [phone] and was informed no female bed available for patient within the  network.  Placed call to Stanley at [facility]  at [phone] and he requested patient information be faxed to him. Faxed 302 to [place] at [phone]. Wait admission decision.   Placed call to Amy [facility] at [phone]  and waiting for call back on bed availability.  Placed call to Lauren [facility] at [phone]  and waiting for call back on bed availability.  Received call back from Олег in [place], no bed available today.  Placed call to [facility] in [place]  and faxed requested information to [phone]. Wait bed availability.  Placed call to [facility] at [phone] and left MOM on bed availability today.  Received call from Kenya , patient's sister at [phone] and notified her of  U bed search. She expressed concerns about patient's well being in returning home after U stay. Suggested she reach out to Adult Protective Services with her concerns as she would have first hand accounts of patient's home situation.

## 2025-05-22 ENCOUNTER — HOSPITAL ENCOUNTER (INPATIENT)
Facility: HOSPITAL | Age: 60
LOS: 12 days | Discharge: HOME | DRG: 753 | End: 2025-06-03
Attending: STUDENT IN AN ORGANIZED HEALTH CARE EDUCATION/TRAINING PROGRAM | Admitting: STUDENT IN AN ORGANIZED HEALTH CARE EDUCATION/TRAINING PROGRAM
Payer: COMMERCIAL

## 2025-05-22 VITALS
SYSTOLIC BLOOD PRESSURE: 117 MMHG | HEART RATE: 93 BPM | TEMPERATURE: 98.6 F | BODY MASS INDEX: 13.18 KG/M2 | OXYGEN SATURATION: 100 % | WEIGHT: 84 LBS | DIASTOLIC BLOOD PRESSURE: 75 MMHG | HEIGHT: 67 IN | RESPIRATION RATE: 20 BRPM

## 2025-05-22 DIAGNOSIS — Z00.8 MEDICAL CLEARANCE FOR PSYCHIATRIC ADMISSION: ICD-10-CM

## 2025-05-22 DIAGNOSIS — E43 SEVERE PROTEIN-CALORIE MALNUTRITION (HCC): Primary | ICD-10-CM

## 2025-05-22 DIAGNOSIS — F31.13 BIPOLAR DISORDER, CURRENT EPISODE MANIC, SEVERE (HCC): ICD-10-CM

## 2025-05-22 LAB
ATRIAL RATE: 80 BPM
P AXIS: 78 DEGREES
PR INTERVAL: 150 MS
QRS AXIS: 257 DEGREES
QRSD INTERVAL: 80 MS
QT INTERVAL: 382 MS
QTC INTERVAL: 441 MS
T WAVE AXIS: 63 DEGREES
VENTRICULAR RATE: 80 BPM

## 2025-05-22 PROCEDURE — 93005 ELECTROCARDIOGRAM TRACING: CPT

## 2025-05-22 PROCEDURE — 99255 IP/OBS CONSLTJ NEW/EST HI 80: CPT | Performed by: STUDENT IN AN ORGANIZED HEALTH CARE EDUCATION/TRAINING PROGRAM

## 2025-05-22 PROCEDURE — 93010 ELECTROCARDIOGRAM REPORT: CPT | Performed by: STUDENT IN AN ORGANIZED HEALTH CARE EDUCATION/TRAINING PROGRAM

## 2025-05-22 PROCEDURE — 99232 SBSQ HOSP IP/OBS MODERATE 35: CPT | Performed by: PHYSICIAN ASSISTANT

## 2025-05-22 PROCEDURE — 99232 SBSQ HOSP IP/OBS MODERATE 35: CPT | Performed by: INTERNAL MEDICINE

## 2025-05-22 RX ORDER — TAMSULOSIN HYDROCHLORIDE 0.4 MG/1
0.4 CAPSULE ORAL
Status: DISCONTINUED | OUTPATIENT
Start: 2025-05-23 | End: 2025-06-01

## 2025-05-22 RX ORDER — LANOLIN ALCOHOL/MO/W.PET/CERES
100 CREAM (GRAM) TOPICAL DAILY
Status: DISCONTINUED | OUTPATIENT
Start: 2025-05-26 | End: 2025-05-22 | Stop reason: HOSPADM

## 2025-05-22 RX ORDER — HYDROXYZINE HYDROCHLORIDE 25 MG/1
50 TABLET, FILM COATED ORAL
Status: CANCELLED | OUTPATIENT
Start: 2025-05-22

## 2025-05-22 RX ORDER — LORAZEPAM 1 MG/1
1 TABLET ORAL
Status: CANCELLED | OUTPATIENT
Start: 2025-05-22

## 2025-05-22 RX ORDER — OLANZAPINE 5 MG/1
5 TABLET, FILM COATED ORAL
Status: DISCONTINUED | OUTPATIENT
Start: 2025-05-22 | End: 2025-06-03 | Stop reason: HOSPADM

## 2025-05-22 RX ORDER — HYDROXYZINE HYDROCHLORIDE 50 MG/1
50 TABLET, FILM COATED ORAL
Status: DISCONTINUED | OUTPATIENT
Start: 2025-05-22 | End: 2025-06-03 | Stop reason: HOSPADM

## 2025-05-22 RX ORDER — TRAZODONE HYDROCHLORIDE 50 MG/1
50 TABLET ORAL
Status: DISCONTINUED | OUTPATIENT
Start: 2025-05-22 | End: 2025-06-03 | Stop reason: HOSPADM

## 2025-05-22 RX ORDER — LANOLIN ALCOHOL/MO/W.PET/CERES
250 CREAM (GRAM) TOPICAL DAILY
Status: DISCONTINUED | OUTPATIENT
Start: 2025-05-23 | End: 2025-05-22 | Stop reason: HOSPADM

## 2025-05-22 RX ORDER — POLYETHYLENE GLYCOL 3350 17 G/17G
17 POWDER, FOR SOLUTION ORAL DAILY PRN
Status: CANCELLED | OUTPATIENT
Start: 2025-05-22

## 2025-05-22 RX ORDER — LANOLIN ALCOHOL/MO/W.PET/CERES
100 CREAM (GRAM) TOPICAL DAILY
Status: CANCELLED | OUTPATIENT
Start: 2025-05-26

## 2025-05-22 RX ORDER — DIVALPROEX SODIUM 125 MG/1
250 CAPSULE, COATED PELLETS ORAL EVERY 12 HOURS SCHEDULED
Status: CANCELLED | OUTPATIENT
Start: 2025-05-22

## 2025-05-22 RX ORDER — LORAZEPAM 2 MG/ML
1 INJECTION INTRAMUSCULAR
Status: DISCONTINUED | OUTPATIENT
Start: 2025-05-22 | End: 2025-06-03 | Stop reason: HOSPADM

## 2025-05-22 RX ORDER — FLUTICASONE FUROATE AND VILANTEROL 100; 25 UG/1; UG/1
1 POWDER RESPIRATORY (INHALATION)
Status: DISCONTINUED | OUTPATIENT
Start: 2025-05-23 | End: 2025-06-03 | Stop reason: HOSPADM

## 2025-05-22 RX ORDER — OLANZAPINE 2.5 MG/1
2.5 TABLET, FILM COATED ORAL
Status: DISCONTINUED | OUTPATIENT
Start: 2025-05-22 | End: 2025-06-03 | Stop reason: HOSPADM

## 2025-05-22 RX ORDER — ACETAMINOPHEN 325 MG/1
650 TABLET ORAL EVERY 4 HOURS PRN
Status: CANCELLED | OUTPATIENT
Start: 2025-05-22

## 2025-05-22 RX ORDER — FLUTICASONE FUROATE AND VILANTEROL 100; 25 UG/1; UG/1
1 POWDER RESPIRATORY (INHALATION)
Status: CANCELLED | OUTPATIENT
Start: 2025-05-23

## 2025-05-22 RX ORDER — LORAZEPAM 1 MG/1
1 TABLET ORAL
Status: DISCONTINUED | OUTPATIENT
Start: 2025-05-22 | End: 2025-06-03 | Stop reason: HOSPADM

## 2025-05-22 RX ORDER — HYDROXYZINE HYDROCHLORIDE 25 MG/1
25 TABLET, FILM COATED ORAL
Status: CANCELLED | OUTPATIENT
Start: 2025-05-22

## 2025-05-22 RX ORDER — ACETAMINOPHEN 325 MG/1
975 TABLET ORAL EVERY 6 HOURS PRN
Status: CANCELLED | OUTPATIENT
Start: 2025-05-22

## 2025-05-22 RX ORDER — OLANZAPINE 5 MG/1
5 TABLET, FILM COATED ORAL
Status: CANCELLED | OUTPATIENT
Start: 2025-05-22

## 2025-05-22 RX ORDER — MAGNESIUM HYDROXIDE/ALUMINUM HYDROXICE/SIMETHICONE 120; 1200; 1200 MG/30ML; MG/30ML; MG/30ML
30 SUSPENSION ORAL EVERY 6 HOURS PRN
Status: DISCONTINUED | OUTPATIENT
Start: 2025-05-22 | End: 2025-06-03 | Stop reason: HOSPADM

## 2025-05-22 RX ORDER — LANOLIN ALCOHOL/MO/W.PET/CERES
250 CREAM (GRAM) TOPICAL DAILY
Status: CANCELLED | OUTPATIENT
Start: 2025-05-23 | End: 2025-05-26

## 2025-05-22 RX ORDER — AMOXICILLIN 250 MG
1 CAPSULE ORAL DAILY PRN
Status: CANCELLED | OUTPATIENT
Start: 2025-05-22

## 2025-05-22 RX ORDER — LANOLIN ALCOHOL/MO/W.PET/CERES
100 CREAM (GRAM) TOPICAL DAILY
Status: DISCONTINUED | OUTPATIENT
Start: 2025-05-26 | End: 2025-05-25

## 2025-05-22 RX ORDER — OLANZAPINE 10 MG/2ML
5 INJECTION, POWDER, FOR SOLUTION INTRAMUSCULAR
Status: CANCELLED | OUTPATIENT
Start: 2025-05-22

## 2025-05-22 RX ORDER — MAGNESIUM HYDROXIDE/ALUMINUM HYDROXICE/SIMETHICONE 120; 1200; 1200 MG/30ML; MG/30ML; MG/30ML
30 SUSPENSION ORAL EVERY 6 HOURS PRN
Status: CANCELLED | OUTPATIENT
Start: 2025-05-22

## 2025-05-22 RX ORDER — OLANZAPINE 5 MG/1
2.5 TABLET, FILM COATED ORAL
Status: CANCELLED | OUTPATIENT
Start: 2025-05-22

## 2025-05-22 RX ORDER — FOLIC ACID 1 MG/1
1 TABLET ORAL DAILY
Status: CANCELLED | OUTPATIENT
Start: 2025-05-23

## 2025-05-22 RX ORDER — POLYETHYLENE GLYCOL 3350 17 G/17G
17 POWDER, FOR SOLUTION ORAL DAILY PRN
Status: DISCONTINUED | OUTPATIENT
Start: 2025-05-22 | End: 2025-05-23

## 2025-05-22 RX ORDER — AMOXICILLIN 250 MG
1 CAPSULE ORAL DAILY PRN
Status: DISCONTINUED | OUTPATIENT
Start: 2025-05-22 | End: 2025-06-03 | Stop reason: HOSPADM

## 2025-05-22 RX ORDER — DIVALPROEX SODIUM 125 MG/1
250 CAPSULE, COATED PELLETS ORAL EVERY 12 HOURS SCHEDULED
Status: DISCONTINUED | OUTPATIENT
Start: 2025-05-23 | End: 2025-05-23

## 2025-05-22 RX ORDER — ACETAMINOPHEN 325 MG/1
650 TABLET ORAL EVERY 4 HOURS PRN
Status: DISCONTINUED | OUTPATIENT
Start: 2025-05-22 | End: 2025-06-03 | Stop reason: HOSPADM

## 2025-05-22 RX ORDER — TAMSULOSIN HYDROCHLORIDE 0.4 MG/1
0.4 CAPSULE ORAL
Status: CANCELLED | OUTPATIENT
Start: 2025-05-22

## 2025-05-22 RX ORDER — LORAZEPAM 2 MG/ML
1 INJECTION INTRAMUSCULAR
Status: CANCELLED | OUTPATIENT
Start: 2025-05-22

## 2025-05-22 RX ORDER — LANOLIN ALCOHOL/MO/W.PET/CERES
250 CREAM (GRAM) TOPICAL DAILY
Status: DISCONTINUED | OUTPATIENT
Start: 2025-05-23 | End: 2025-05-25

## 2025-05-22 RX ORDER — TRAZODONE HYDROCHLORIDE 50 MG/1
50 TABLET ORAL
Status: CANCELLED | OUTPATIENT
Start: 2025-05-22

## 2025-05-22 RX ORDER — FOLIC ACID 1 MG/1
1 TABLET ORAL DAILY
Status: DISCONTINUED | OUTPATIENT
Start: 2025-05-23 | End: 2025-05-28

## 2025-05-22 RX ORDER — HYDROXYZINE HYDROCHLORIDE 25 MG/1
25 TABLET, FILM COATED ORAL
Status: DISCONTINUED | OUTPATIENT
Start: 2025-05-22 | End: 2025-06-03 | Stop reason: HOSPADM

## 2025-05-22 RX ORDER — OLANZAPINE 10 MG/2ML
5 INJECTION, POWDER, FOR SOLUTION INTRAMUSCULAR
Status: DISCONTINUED | OUTPATIENT
Start: 2025-05-22 | End: 2025-06-03 | Stop reason: HOSPADM

## 2025-05-22 RX ORDER — ACETAMINOPHEN 325 MG/1
975 TABLET ORAL EVERY 6 HOURS PRN
Status: DISCONTINUED | OUTPATIENT
Start: 2025-05-22 | End: 2025-06-03 | Stop reason: HOSPADM

## 2025-05-22 RX ADMIN — DIVALPROEX SODIUM 250 MG: 125 CAPSULE, COATED PELLETS ORAL at 20:11

## 2025-05-22 RX ADMIN — TAMSULOSIN HYDROCHLORIDE 0.4 MG: 0.4 CAPSULE ORAL at 16:10

## 2025-05-22 RX ADMIN — FOLIC ACID 1 MG: 1 TABLET ORAL at 08:00

## 2025-05-22 RX ADMIN — FLUTICASONE FUROATE AND VILANTEROL TRIFENATATE 1 PUFF: 100; 25 POWDER RESPIRATORY (INHALATION) at 07:54

## 2025-05-22 RX ADMIN — OLANZAPINE 5 MG: 5 TABLET, FILM COATED ORAL at 20:11

## 2025-05-22 RX ADMIN — Medication 3 MG: at 22:06

## 2025-05-22 RX ADMIN — MULTIPLE VITAMINS W/ MINERALS TAB 1 TABLET: TAB ORAL at 08:00

## 2025-05-22 RX ADMIN — DIVALPROEX SODIUM 125 MG: 125 CAPSULE, COATED PELLETS ORAL at 08:00

## 2025-05-22 RX ADMIN — THIAMINE HYDROCHLORIDE 250 MG: 100 INJECTION, SOLUTION INTRAMUSCULAR; INTRAVENOUS at 08:02

## 2025-05-22 NOTE — PROGRESS NOTES
Progress Note - Nephrology   Name: Berta Smart 59 y.o. female I MRN: [id number]  Unit/Bed#: -01 I Date of Admission: 5/18/2025   Date of Service: 5/22/2025 I Hospital Day: 4    Assessment & Plan  Urinary retention  Dietrich catheter placed 5/20  Remains in place  Monitor I&O  Stable for discharge per nephrology viewpoint  At high risk for electrolyte imbalance  Sodium- stable  Magnesium 2.1 - remains stable  Phosphorus and potassium stable  Pt refuse AM labs.   Nephrology will sign off at this time. Please feel free to reach out to our group with any changes in patient.   Alcoholism (HCC)  On CIWA  Per primary team  Encephalopathy  Psychiatric consulted, following. Pt for pending d/c today. 302 completed - awaiting bed per primary team   CT head without: Negative  Recently hospitalized 4/4-4/8 for encephalopathy.   Possible d/c today per primary team to appropriate  facility          Subjective   Pt denies pain. Denies SOB and CP. Pt stated she did not need to see the nephrology group anymore at this time. No other concerns at this time.         Objective :  Temp:  [97.9 °F (36.6 °C)-98.8 °F (37.1 °C)] 98.8 °F (37.1 °C)  HR:  [] 104  BP: ()/(65-76) 122/76  SpO2:  [86 %-98 %] 98 %  O2 Device: None (Room air)    Current Weight: Weight - Scale: 38.1 kg (83 lb 15.9 oz)  First Weight: Weight - Scale: 38.1 kg (83 lb 15.9 oz)  I/O         05/20 0701  05/21 0700 05/21 0701  05/22 0700 05/22 0701  05/23 0700    P.O. 240 460     IV Piggyback  50     Total Intake(mL/kg) 240 (6.3) 510 (13.4)     Urine (mL/kg/hr) 1950 (2.1) 975 (1.1)     Total Output 1950 975     Net -1710 -465            Unmeasured Urine Occurrence  1 x           Physical Exam  Constitutional:       Appearance: She is ill-appearing.     Cardiovascular:      Rate and Rhythm: Normal rate.   Pulmonary:      Effort: Pulmonary effort is normal. No respiratory distress.      Breath sounds: Normal breath sounds. No wheezing or rales.  "    Musculoskeletal:      Right lower leg: No edema.      Left lower leg: No edema.     Neurological:      Mental Status: She is alert.         Medications:  Current Medications[1]      Lab Results: I have reviewed the following results:  Results from last 7 days   Lab Units 05/21/25  0256 05/20/25  0444 05/19/25  0719 05/18/25  0954   WBC Thousand/uL 7.35 7.95 6.05 7.10   HEMOGLOBIN g/dL 13.6 14.6 13.6 14.8   HEMATOCRIT % 40.6 42.8 40.0 43.4   PLATELETS Thousands/uL 304 326 276 321   POTASSIUM mmol/L 3.9 4.2 3.9 4.3   CHLORIDE mmol/L 97 99 96 89*   CO2 mmol/L 30 27 26 32   BUN mg/dL 19 8 5 8   CREATININE mg/dL 0.43* 0.51* 0.42* 0.47*   CALCIUM mg/dL 9.2 9.2 8.7 9.7   MAGNESIUM mg/dL  --  2.1 1.7* 1.7*   PHOSPHORUS mg/dL  --   --  3.7 4.0   ALBUMIN g/dL  --  3.8 3.4* 4.2       Administrative Statements     Portions of the record may have been created with voice recognition software. Occasional wrong word or \"sound a like\" substitutions may have occurred due to the inherent limitations of voice recognition software. Read the chart carefully and recognize, using context, where substitutions have occurred.If you have any questions, please contact the dictating provider.       [1]   Current Facility-Administered Medications:     acetaminophen (TYLENOL) tablet 650 mg, 650 mg, Oral, Q6H PRN, Swetha Baker MD    aluminum-magnesium hydroxide-simethicone (MAALOX) oral suspension 30 mL, 30 mL, Oral, Q6H PRN, Swetha Baker MD    divalproex sodium (DEPAKOTE SPRINKLE) capsule 250 mg, 250 mg, Oral, Q12H [facility], Yue Chavarria PA-C, 125 mg at 05/22/25 0800    Fluticasone Furoate-Vilanterol 100-25 mcg/actuation 1 puff, 1 puff, Inhalation, Daily, Swetha Baker MD, 1 puff at 05/22/25 0754    folic acid (FOLVITE) tablet 1 mg, 1 mg, Oral, Daily, Swetha Baker MD, 1 mg at 05/22/25 0800    melatonin tablet 3 mg, 3 mg, Oral, HS, Swetha Baker MD, 3 mg at 05/20/25 2151    multivitamin-minerals (CENTRUM) tablet 1 tablet, " 1 tablet, Oral, Daily, Swetha Baker MD, 1 tablet at 05/22/25 0800    OLANZapine (ZyPREXA) IM injection 5 mg, 5 mg, Intramuscular, Q6H PRN **OR** OLANZapine (ZyPREXA) tablet 5 mg, 5 mg, Oral, Q6H PRN, Yue Chavarria PA-C, 5 mg at 05/21/25 0455    ondansetron (ZOFRAN) injection 4 mg, 4 mg, Intravenous, Q4H PRN, Swetha Baker MD    polyethylene glycol (MIRALAX) packet 17 g, 17 g, Oral, Daily PRN, Swetha Baker MD    tamsulosin (FLOMAX) capsule 0.4 mg, 0.4 mg, Oral, Daily With Dinner, Yue Chavarria PA-C, 0.4 mg at 05/21/25 1711    [COMPLETED] thiamine (VITAMIN B1) 500 mg in sodium chloride 0.9 % 50 mL IVPB, 500 mg, Intravenous, TID, Last Rate: 100 mL/hr at 05/20/25 0912, 500 mg at 05/20/25 0912 **FOLLOWED BY** thiamine (VITAMIN B1) 250 mg in sodium chloride 0.9 % 50 mL IVPB, 250 mg, Intravenous, Daily, Last Rate: 100 mL/hr at 05/22/25 0802, 250 mg at 05/22/25 0802 **FOLLOWED BY** [DISCONTINUED] thiamine (VITAMIN B1) 100 mg in sodium chloride 0.9 % 50 mL IVPB, 100 mg, Intravenous, TID **FOLLOWED BY** [DISCONTINUED] thiamine (VITAMIN B1) 100 mg in sodium chloride 0.9 % 50 mL IVPB, 100 mg, Intravenous, Daily, Swetha Baker MD

## 2025-05-22 NOTE — ASSESSMENT & PLAN NOTE
Sodium- stable  Magnesium 2.1 - remains stable  Phosphorus and potassium stable  Pt refuse AM labs.   Nephrology will sign off at this time. Please feel free to reach out to our group with any changes in patient.

## 2025-05-22 NOTE — PROGRESS NOTES
Progress Note - Hospitalist   Name: Berta Smart 59 y.o. female I MRN: [id number]  Unit/Bed#: -01 I Date of Admission: 5/18/2025   Date of Service: 5/22/2025 I Hospital Day: 4    Assessment & Plan  Encephalopathy  Presented for AMS and reported aggression toward family members. Recently admitted 4/4-4/8 for encephalopathy thought possible 2/2 Wernicke's vs UTI, treated with high dose thiamine due to hx of ETOH use  CTH: no acute intracranial abnormality   Not meeting SIRS criteria, no signs/sx of infection  UA negative for infection  Hyponatremic (129) on admission, see below   Neurology consulted   Continue thiamine  Non-focal neurologic exam  Suspected primary psychiatric illness   No further inpatient neurologic recommendations   Psychiatry consulted   Suspect acute manic episode in setting of bipolar 1 disorder  Recommending depakote 250 mg BID --> patient refusing to take this   Recommend PO zyprexa 5 mg PO or IM for agitation   302 completed 5/20  Patient is medically stable for discharge to IP BHU pending bed availability  Colostomy status (HCC)  Hx of Ulcerative colitis. S/P colon resection approx. 15 years ago.   Continue to monitor output.    Continue colostomy care.    Alcoholism (HCC)  On CIWA protocol  On thiamine taper  Continue to encourage continued sensation.   Severe protein-calorie malnutrition (HCC)  Malnutrition Findings:   Adult Malnutrition type: Chronic illness  BMI Findings:  Adult BMI Classifications: Underweight < 18.5  Body mass index is 13.16 kg/m².     Continue encourage adequate oral hydration and nutrition.  Urinary retention  Dietrich catheter placed early am 5/20 for retention   Per chart review patient had prior urinary catheter for retention in the past   Cont flomax  Likely maintain on DC with outpatient urology f/u    VTE Pharmacologic Prophylaxis: VTE Score: 1 Low Risk (Score 0-2) - Encourage Ambulation.    Mobility:   Basic Mobility Inpatient Raw Score: 18  -[facility] Goal: 6:  Walk 10 steps or more  JH-HLM Achieved: 1: Laying in bed  JH-HLM Goal NOT achieved. Continue with multidisciplinary rounding and encourage appropriate mobility to improve upon JH-HLM goals.    Patient Centered Rounds: I performed bedside rounds with nursing staff today.   Discussions with Specialists or Other Care Team Provider: JANINE    Education and Discussions with Family / Patient: Patient declined call to .     Current Length of Stay: 4 day(s)  Current Patient Status: Inpatient   Certification Statement: The patient will continue to require additional inpatient hospital stay due to pending IP BHU placement  Discharge Plan: Anticipate discharge later today or tomorrow to inpatient psych.    Code Status: Level 1 - Full Code    Subjective   Denies any physical complaints this morning.  More cooperative than yesterday.  Reports poor sleep.    Objective :  Temp:  [97.9 °F (36.6 °C)] 97.9 °F (36.6 °C)  HR:  [78] 78  BP: ()/(65-68) 95/65  SpO2:  [86 %-92 %] 92 %  O2 Device: None (Room air)    Body mass index is 13.16 kg/m².     Input and Output Summary (last 24 hours):     Intake/Output Summary (Last 24 hours) at 5/22/2025 0754  Last data filed at 5/22/2025 0557  Gross per 24 hour   Intake 510 ml   Output 975 ml   Net -465 ml       Physical Exam  Vitals and nursing note reviewed.   Constitutional:       Appearance: She is underweight.      Comments: No acute distress   HENT:      Head: Normocephalic.     Eyes:      General: No scleral icterus.     Extraocular Movements: Extraocular movements intact.      Conjunctiva/sclera: Conjunctivae normal.       Cardiovascular:      Rate and Rhythm: Normal rate and regular rhythm.   Pulmonary:      Effort: Pulmonary effort is normal.      Breath sounds: Normal breath sounds. No wheezing, rhonchi or rales.   Abdominal:      General: Bowel sounds are normal.      Palpations: Abdomen is soft.      Tenderness: There is no abdominal tenderness. There is no guarding or  rebound.     Musculoskeletal:         General: No swelling, tenderness or deformity.      Cervical back: Normal range of motion.      Comments: Able to move upper/lower ext bilaterally, no edema     Skin:     General: Skin is warm and dry.     Neurological:      General: No focal deficit present.      Mental Status: She is alert.     Psychiatric:         Speech: Speech is tangential.           Lines/Drains:  Lines/Drains/Airways       Active Status       Name Placement date Placement time Site Days    Ileostomy LLQ 04/07/25  --  LLQ  45    Urethral Catheter Latex 16 Fr. 05/20/25  0436  Latex  2                  Urinary Catheter:  Goal for removal: N/A- Discharging with Dietrich                 Lab Results: I have reviewed the following results:   Results from last 7 days   Lab Units 05/21/25  0256 05/19/25  0719 05/18/25  0954   WBC Thousand/uL 7.35   < > 7.10   HEMOGLOBIN g/dL 13.6   < > 14.8   HEMATOCRIT % 40.6   < > 43.4   PLATELETS Thousands/uL 304   < > 321   LYMPHO PCT %  --   --  38   MONO PCT %  --   --  11   EOS PCT %  --   --  7*    < > = values in this interval not displayed.     Results from last 7 days   Lab Units 05/21/25  0256 05/20/25  0444   SODIUM mmol/L 135 134*   POTASSIUM mmol/L 3.9 4.2   CHLORIDE mmol/L 97 99   CO2 mmol/L 30 27   BUN mg/dL 19 8   CREATININE mg/dL 0.43* 0.51*   ANION GAP mmol/L 8 8   CALCIUM mg/dL 9.2 9.2   ALBUMIN g/dL  --  3.8   TOTAL BILIRUBIN mg/dL  --  0.40   ALK PHOS U/L  --  62   ALT U/L  --  8   AST U/L  --  11*   GLUCOSE RANDOM mg/dL 103 106                 Results from last 7 days   Lab Units 05/18/25  0954   LACTIC ACID mmol/L 1.4   PROCALCITONIN ng/ml <0.05       Recent Cultures (last 7 days):         Imaging Results Review: No pertinent imaging studies reviewed.  Other Study Results Review: No additional pertinent studies reviewed.    Last 24 Hours Medication List:     Current Facility-Administered Medications:     acetaminophen (TYLENOL) tablet 650 mg, Q6H PRN     aluminum-magnesium hydroxide-simethicone (MAALOX) oral suspension 30 mL, Q6H PRN    divalproex sodium (DEPAKOTE SPRINKLE) capsule 250 mg, Q12H SHWETA    Fluticasone Furoate-Vilanterol 100-25 mcg/actuation 1 puff, Daily    folic acid (FOLVITE) tablet 1 mg, Daily    melatonin tablet 3 mg, HS    multivitamin-minerals (CENTRUM) tablet 1 tablet, Daily    OLANZapine (ZyPREXA) IM injection 5 mg, Q6H PRN **OR** OLANZapine (ZyPREXA) tablet 5 mg, Q6H PRN    ondansetron (ZOFRAN) injection 4 mg, Q4H PRN    polyethylene glycol (MIRALAX) packet 17 g, Daily PRN    tamsulosin (FLOMAX) capsule 0.4 mg, Daily With Dinner    [COMPLETED] thiamine (VITAMIN B1) 500 mg in sodium chloride 0.9 % 50 mL IVPB, TID, Last Rate: 500 mg (05/20/25 0912) **FOLLOWED BY** thiamine (VITAMIN B1) 250 mg in sodium chloride 0.9 % 50 mL IVPB, Daily, Last Rate: 100 mL/hr at 05/21/25 1430 **FOLLOWED BY** [DISCONTINUED] thiamine (VITAMIN B1) 100 mg in sodium chloride 0.9 % 50 mL IVPB, TID **FOLLOWED BY** [DISCONTINUED] thiamine (VITAMIN B1) 100 mg in sodium chloride 0.9 % 50 mL IVPB, Daily    Administrative Statements   Today, Patient Was Seen By: Shayla Mukherjee PA-C  I have spent a total time of 35 minutes in caring for this patient on the day of the visit/encounter including Diagnostic results, Instructions for management, Patient and family education, Importance of tx compliance, Risk factor reductions, Impressions, Counseling / Coordination of care, Documenting in the medical record, and Reviewing/placing orders in the medical record (including tests, medications, and/or procedures).    **Please Note: This note may have been constructed using a voice recognition system.**

## 2025-05-22 NOTE — NURSING NOTE
2337: This RN attempted to administer pts medications for the night. Pt refused medications. RN educated pt on need for medication. Pt continued to refuse. Pts IV needed to be changed due to drainage. This RN attempted to place new IV. Pt refused. Reported this to Ayesha Lau PA-C.    0140: Pt allowed RN to place IV, still refusing medications for the night.
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
## 2025-05-22 NOTE — PLAN OF CARE
Problem: Potential for Falls  Goal: Patient will remain free of falls  Description: INTERVENTIONS:  - Educate patient/family on patient safety including physical limitations  - Instruct patient to call for assistance with activity   - Consider consulting OT/PT to assist with strengthening/mobility based on AM PAC & JH-HLM score  - Consult OT/PT to assist with strengthening/mobility   - Keep Call bell within reach  - Keep bed low and locked with side rails adjusted as appropriate  - Keep care items and personal belongings within reach  - Initiate and maintain comfort rounds  - Make Fall Risk Sign visible to staff  - Offer Toileting every  Hours, in advance of need  - Initiate/Maintain alarm  - Obtain necessary fall risk management equipment:   - Apply yellow socks and bracelet for high fall risk patients  - Consider moving patient to room near nurses station  Outcome: Progressing     Problem: PAIN - ADULT  Goal: Verbalizes/displays adequate comfort level or baseline comfort level  Description: Interventions:  - Encourage patient to monitor pain and request assistance  - Assess pain using appropriate pain scale  - Administer analgesics as ordered based on type and severity of pain and evaluate response  - Implement non-pharmacological measures as appropriate and evaluate response  - Consider cultural and social influences on pain and pain management  - Notify physician/advanced practitioner if interventions unsuccessful or patient reports new pain  - Educate patient/family on pain management process including their role and importance of  reporting pain   - Provide non-pharmacologic/complimentary pain relief interventions  Outcome: Progressing     Problem: INFECTION - ADULT  Goal: Absence or prevention of progression during hospitalization  Description: INTERVENTIONS:  - Assess and monitor for signs and symptoms of infection  - Monitor lab/diagnostic results  - Monitor all insertion sites, i.e. indwelling lines,  tubes, and drains  - Monitor endotracheal if appropriate and nasal secretions for changes in amount and color  - Henning appropriate cooling/warming therapies per order  - Administer medications as ordered  - Instruct and encourage patient and family to use good hand hygiene technique  - Identify and instruct in appropriate isolation precautions for identified infection/condition  Outcome: Progressing  Goal: Absence of fever/infection during neutropenic period  Description: INTERVENTIONS:  - Monitor WBC  - Perform strict hand hygiene  - Limit to healthy visitors only  - No plants, dried, fresh or silk flowers with cowan in patient room  Outcome: Progressing     Problem: SAFETY ADULT  Goal: Patient will remain free of falls  Description: INTERVENTIONS:  - Educate patient/family on patient safety including physical limitations  - Instruct patient to call for assistance with activity   - Consider consulting OT/PT to assist with strengthening/mobility based on AM PAC & -HLM score  - Consult OT/PT to assist with strengthening/mobility   - Keep Call bell within reach  - Keep bed low and locked with side rails adjusted as appropriate  - Keep care items and personal belongings within reach  - Initiate and maintain comfort rounds  - Make Fall Risk Sign visible to staff  - Offer Toileting every  Hours, in advance of need  - Initiate/Maintain alarm  - Obtain necessary fall risk management equipment:   - Apply yellow socks and bracelet for high fall risk patients  - Consider moving patient to room near nurses station  Outcome: Progressing  Goal: Maintain or return to baseline ADL function  Description: INTERVENTIONS:  -  Assess patient's ability to carry out ADLs; assess patient's baseline for ADL function and identify physical deficits which impact ability to perform ADLs (bathing, care of mouth/teeth, toileting, grooming, dressing, etc.)  - Assess/evaluate cause of self-care deficits   - Assess range of motion  - Assess  patient's mobility; develop plan if impaired  - Assess patient's need for assistive devices and provide as appropriate  - Encourage maximum independence but intervene and supervise when necessary  - Involve family in performance of ADLs  - Assess for home care needs following discharge   - Consider OT consult to assist with ADL evaluation and planning for discharge  - Provide patient education as appropriate  - Monitor functional capacity and physical performance, use of AM PAC & JH-HLM   - Monitor gait, balance and fatigue with ambulation    Outcome: Progressing  Goal: Maintains/Returns to pre admission functional level  Description: INTERVENTIONS:  - Perform AM-PAC 6 Click Basic Mobility/ Daily Activity assessment daily.  - Set and communicate daily mobility goal to care team and patient/family/caregiver.   - Collaborate with rehabilitation services on mobility goals if consulted  - Perform Range of Motion 3 times a day.  - Reposition patient every 2 hours.  - Dangle patient 3 times a day  - Stand patient 3 times a day  - Ambulate patient 3 times a day  - Out of bed to chair 3 times a day   - Out of bed for meals 3 times a day  - Out of bed for toileting  - Record patient progress and toleration of activity level   Outcome: Progressing     Problem: DISCHARGE PLANNING  Goal: Discharge to home or other facility with appropriate resources  Description: INTERVENTIONS:  - Identify barriers to discharge w/patient and caregiver  - Arrange for needed discharge resources and transportation as appropriate  - Identify discharge learning needs (meds, wound care, etc.)  - Arrange for interpretive services to assist at discharge as needed  - Refer to Case Management Department for coordinating discharge planning if the patient needs post-hospital services based on physician/advanced practitioner order or complex needs related to functional status, cognitive ability, or social support system  Outcome: Progressing

## 2025-05-22 NOTE — CONSULTS
TELEConsultation - Behavioral Health   Berta Smart 59 y.o. female MRN: [id number]  Unit/Bed#: -01 Encounter: [id number]  Hospital Day: 4    VIRTUAL CARE DOCUMENTATION:     1. This service was provided via Telemedicine using: Teams Virtual Rounding      2. Parties in the room with patient during teleconsult: Patient only    3. Confidentiality: My office door was closed     4. Participants: No one else was in the room    5. Patient acknowledged consent and understanding of privacy and security of the  Telemedicine consult. I informed the patient that I have reviewed their record in Epic and presented the opportunity for them to ask any questions regarding the visit today.  The patient agreed to participate.    6. I have spent a total time of 60 minutes in caring for this patient on the day of the visit/encounter, NOT including the time spent for establishing the audio/video connection, but including Diagnostic results, Prognosis, Risks and benefits of tx options, Instructions for management, Patient and family education including obtaining collateral information, Importance of tx compliance, Risk factor reductions, Assessment & Impressions, Counseling / Coordination of care, Documenting in the medical record, Reviewing / ordering tests, medicine, and procedures  , Obtaining or reviewing history  , and Communicating with other healthcare professionals.       Assessment & Plan     Assessment: 59-year-old woman with psychiatric history of PTSD who presented to the hospital with altered mental status and aggressive behavior towards family.  Patient has current manic episode and continues to demonstrate poor insight into her symptoms and behaviors.  Continues to demonstrate disorganized thought process, agitation, significant irritability on interview.  Given patient's poor insight into current mental status and current disorganized thought process, she is on able to sign 201 as she lacks capacity to do so.  Patient  "continues to repeat that she is going home with her family despite repeated conversations to discuss inpatient behavioral health treatment.  Will petition 303 for involuntary psychiatric hospitalization.    Assessment & Plan  Encephalopathy  --Patient is alert and oriented with an intact attention and concentration, does not appear encephalopathic at this time  Bipolar 1 disorder with moderate shahida (HCC)  --Patient meets criteria for current manic episode and requires inpatient psychiatric hospitalization for safety and stabilization otherwise her shahida is unlikely to remit, lacks capacity and thus we should petition 302 for involuntary psychiatric hospitalization  --Only once medically stable, that means if the patient were not going to inpatient psychiatric treatment she would be discharged home, recommend initiating 302 process and referral for inpatient behavioral health treatment.  Do not petition or uphold 302 until patient is medically stable  --Initiate Depakote 250 mg every 12 hours the patient is amenable  --If patient is exhibiting dangerous behaviors immediately putting patient or others at risk of harm, recommend Zyprexa 5 mg p.o. as needed if patient is able to tolerate or take p.o. medications or 5 mg IM as needed agitation if patient is unable to participate in p.o. medications  Urinary retention         Diagnoses, available treatment options, alternatives to treatment, and risks vs. benefits of current psychiatric treatment plan were discussed with the patient.  Prior records were reviewed in [facility].  The case was discussed with the primary team.      History of Present Illness   Physician Requesting Consult: Jadon Kaplan MD    Chief Complaint: \"You are keeping me here\"    Reason for Consult / Principal Problem: 303 evaluation     59-year-old woman with a psychiatric history of PTSD and alcohol use disorder who presented initially for altered mental status and aggressive behavior.  Evaluated by " myself on 5/19/2025 and patient was found to have current manic episode.  Patient had poor insight into condition and 302 was petitioned for inability to care for self due to disorganized thought process and behavior, agitation and irritability.  Symptoms unlikely to readmit without treatment.  Patient was started on Depakote.  Psychiatry consulted for evaluation of need for 303 for extended involuntary psychiatric hospitalization.  Patient still pending placement for mental health treatment.    On evaluation,    Patient was generally uncooperative with interview.  She reports feeling angry that I am keeping her in the hospital.  Attempted to discuss her mental health symptoms and treatment but she was not cooperative with interview.  Continue to repeat that she is going home with her family.  Attempted to discuss the process for inpatient psychiatric hospitalization and she continued to state that she was ready for discharge with her family without acknowledging the topic of behavioral health hospitalization.  She was notably irritable on exam, disorganized in thought process.  Patient concluded interview stating that she is ready to go home.    Discussed case with nursing who reports that the patient continues to be irritable and agitated, saying she is ready to leave.  Poor insight into symptoms and mental health treatment.    Psychiatric Review Of Systems:  Medication side effects: none  Sleep: 1 hour  Appetite: no change  Hygiene: able to tend to instrumental and basic ADLs  Anxiety Symptoms: denies  Psychotic Symptoms: denies  Depression Symptoms: denies  Manic Symptoms: endorses poor sleep, mood lability, irritability  PTSD Symptoms: denies  Suicidal Thoughts: denies  Homicidal Thoughts: denies       Historical Information   Psychiatric History:   Diagnoses: BPAD  Inpatient Hx: multiple  Outpatient Hx: none  Medications/Trials: none    Substance Abuse History:    Social History     Substance and Sexual  Activity   Alcohol Use Yes    Comment: drinks beer     Social History     Substance and Sexual Activity   Drug Use Not Currently    Comment: Denies use       I discussed substance abuse with the patient and, if pertinent, discussed risks vs benefits of decreasing frequency of use.    Family History:   Family History[1]    Social History  Rest of social history as per below:  Social History     Socioeconomic History    Marital status: /Civil Union     Spouse name: Not on file    Number of children: Not on file    Years of education: Not on file    Highest education level: Not on file   Occupational History    Not on file   Tobacco Use    Smoking status: Every Day     Types: Cigarettes    Smokeless tobacco: Current   Vaping Use    Vaping status: Never Used   Substance and Sexual Activity    Alcohol use: Yes     Comment: drinks beer    Drug use: Not Currently     Comment: Denies use    Sexual activity: Not Currently   Other Topics Concern    Not on file   Social History Narrative    Not on file     Social Drivers of Health     Financial Resource Strain: Not on file   Food Insecurity: Patient Declined (5/18/2025)    Nursing - Inadequate Food Risk Classification     Worried About Running Out of Food in the Last Year: Not on file     Ran Out of Food in the Last Year: Not on file     Ran Out of Food in the Last Year: Patient declined   Recent Concern: Food Insecurity - Food Insecurity Present (4/4/2025)    Nursing - Inadequate Food Risk Classification     Worried About Running Out of Food in the Last Year: Not on file     Ran Out of Food in the Last Year: Not on file     Ran Out of Food in the Last Year: Sometimes true   Transportation Needs: Patient Declined (5/18/2025)    Nursing - Transportation Risk Classification     Lack of Transportation: Not on file     Lack of Transportation: Patient declined   Physical Activity: Inactive (7/30/2020)    Exercise Vital Sign     Days of Exercise per Week: 0 days     Minutes  of Exercise per Session: 0 min   Stress: Stress Concern Present (2020)    [employer] - Occupational Stress Questionnaire     Feeling of Stress : Very much   Social Connections: Moderately Isolated (2020)    Social Connection and Isolation Panel     Frequency of Communication with Friends and Family: More than three times a week     Frequency of Social Gatherings with Friends and Family: Never     Attends Nondenominational Services: Never     Active Member of Clubs or Organizations: No     Attends Club or Organization Meetings: Never     Marital Status:    Intimate Partner Violence: Unknown (2025)    Nursing IPS     Feels Physically and Emotionally Safe: Not on file     Physically Hurt by Someone: Not on file     Humiliated or Emotionally Abused by Someone: Not on file     Physically Hurt by Someone: No     Hurt or Threatened by Someone: No   Housing Stability: Patient Declined (2025)    Nursing: Inadequate Housing Risk Classification     Has Housing: Not on file     Worried About Losing Housing: Not on file     Unable to Get Utilities: Not on file     Unable to Pay for Housing in the Last Year: Patient declined     Has Housin   Recent Concern: Housing Stability - At Risk (2025)    Nursing: Inadequate Housing Risk Classification     Has Housing: Not on file     Worried About Losing Housing: Not on file     Unable to Get Utilities: Not on file     Unable to Pay for Housing in the Last Year: Yes     Has Housin       Past Medical History[2]    Meds/Allergies   Allergies[3]    Current Facility-Administered Medications:     acetaminophen (TYLENOL) tablet 650 mg, Q6H PRN    aluminum-magnesium hydroxide-simethicone (MAALOX) oral suspension 30 mL, Q6H PRN    divalproex sodium (DEPAKOTE SPRINKLE) capsule 250 mg, Q12H SHWETA    Fluticasone Furoate-Vilanterol 100-25 mcg/actuation 1 puff, Daily    folic acid (FOLVITE) tablet 1 mg, Daily    melatonin tablet 3 mg, HS     multivitamin-minerals (CENTRUM) tablet 1 tablet, Daily    OLANZapine (ZyPREXA) IM injection 5 mg, Q6H PRN **OR** OLANZapine (ZyPREXA) tablet 5 mg, Q6H PRN    ondansetron (ZOFRAN) injection 4 mg, Q4H PRN    polyethylene glycol (MIRALAX) packet 17 g, Daily PRN    tamsulosin (FLOMAX) capsule 0.4 mg, Daily With Dinner    [COMPLETED] thiamine (VITAMIN B1) 500 mg in sodium chloride 0.9 % 50 mL IVPB, TID, Last Rate: 500 mg (05/20/25 0912) **FOLLOWED BY** thiamine (VITAMIN B1) 250 mg in sodium chloride 0.9 % 50 mL IVPB, Daily, Last Rate: 250 mg (05/22/25 0802) **FOLLOWED BY** [DISCONTINUED] thiamine (VITAMIN B1) 100 mg in sodium chloride 0.9 % 50 mL IVPB, TID **FOLLOWED BY** [DISCONTINUED] thiamine (VITAMIN B1) 100 mg in sodium chloride 0.9 % 50 mL IVPB, Daily    Current Medications:  Current medications as per above. All medications have been reviewed.   Risks, benefits, alternatives, and possible side effects of patient's psychiatric medications were discussed with patient.     Objective   Vital signs in last 24 hours:  Temp:  [97.9 °F (36.6 °C)-98.8 °F (37.1 °C)] 98.6 °F (37 °C)  HR:  [] 93  BP: ()/(65-76) 117/75  Resp:  [20] 20  SpO2:  [86 %-100 %] 100 %  O2 Device: None (Room air)    Mental Status Exam:  Appearance: disheveled, appears consistent with stated age  Motor: +psychomotor agitation, no gait abnormalities  Behavior: uncooperative  Speech:  mumbled  Mood: irritable  Affect: expansive, labile, manic-appearing  Thought Process: disorganized, tangential, grandiose  Thought Content: denies auditory hallucinations, denies visual hallucinations, denies delusions  Risk Potential: denies suicidal ideation, plan, or intent. Denies homicidal ideation  Sensorium: Oriented to person, place, time, and situation  Cognition: cognitive ability appears intact but was not quantitatively tested  Consciousness: alert and awake  Attention: currently impaired  Insight: limited  Judgement: limited      Laboratory  results:  I have personally reviewed all pertinent laboratory/tests results.  Recent Results (from the past 48 hours)   Basic metabolic panel    Collection Time: 05/21/25  2:56 AM   Result Value Ref Range    Sodium 135 135 - 147 mmol/L    Potassium 3.9 3.5 - 5.3 mmol/L    Chloride 97 96 - 108 mmol/L    CO2 30 21 - 32 mmol/L    ANION GAP 8 4 - 13 mmol/L    BUN 19 5 - 25 mg/dL    Creatinine 0.43 (L) 0.60 - 1.30 mg/dL    Glucose 103 65 - 140 mg/dL    Calcium 9.2 8.4 - 10.2 mg/dL    eGFR 110 ml/min/1.73sq m   CBC and differential    Collection Time: 05/21/25  2:56 AM   Result Value Ref Range    WBC 7.35 4.31 - 10.16 Thousand/uL    RBC 4.39 3.81 - 5.12 Million/uL    Hemoglobin 13.6 11.5 - 15.4 g/dL    Hematocrit 40.6 34.8 - 46.1 %    MCV 93 82 - 98 fL    MCH 31.0 26.8 - 34.3 pg    MCHC 33.5 31.4 - 37.4 g/dL    RDW 12.9 11.6 - 15.1 %    MPV 9.2 8.9 - 12.7 fL    Platelets 304 149 - 390 Thousands/uL        Lucas Madrid MD    This note has been constructed using a voice recognition system. There may be translation, syntax, or grammatical errors. If you have any questions, please contact the dictating provider.         [1]   Family History  Problem Relation Name Age of Onset    Heart disease Mother      Stroke Mother      Hypertension Mother      Kidney disease Mother      No Known Problems Father      No Known Problems Sister      No Known Problems Brother     [2]   Past Medical History:  Diagnosis Date    Alcoholism (HCC)     Depression     Hypothyroidism     PTSD (post-traumatic stress disorder)    [3] No Known Allergies

## 2025-05-22 NOTE — CASE MANAGEMENT
Case Management Discharge Planning Note    Patient name Berta Smart  Location /-01 MRN [id number]  : 1965 Date 2025       Current Admission Date: 2025  Current Admission Diagnosis:Encephalopathy   Patient Active Problem List    Diagnosis Date Noted    At high risk for electrolyte imbalance 2025    Bipolar 1 disorder with moderate shahida (HCC) 2025    Dysuria 2025    Adjustment disorder with mixed disturbance of emotions and conduct 2025    Severe protein-calorie malnutrition (HCC) 2025    Encephalopathy 2025    Urinary retention 2025    Colostomy in place (HCC) 2025    SOB (shortness of breath) 2020    Abnormal CXR 2020    Colostomy status (HCC) 2020    KRAIG (generalized anxiety disorder) 2020    Current mild episode of major depressive disorder without prior episode (HCC) 2020    Tobacco use disorder 2020    Alcoholism ([facility]) 2020    BMI less than 19,adult 2020      LOS (days): 4  Geometric Mean LOS (GMLOS) (days):   Days to GMLOS:     OBJECTIVE:  Risk of Unplanned Readmission Score: 14.49         Current admission status: Inpatient   Preferred Pharmacy:   [facility] [address] [place] [id number]  Phone: [phone] Fax: [phone]    Primary Care Provider: Isabella Turner MD    Primary Insurance: [employer]  Secondary Insurance:     DISCHARGE DETAILS:     Spoke with Gricelda from [facility], pt being seen by Dr Petersen for 302 proceedings.   Spoke with Ayesha from [facility], their provider is reviewing for acceptance today. Waiting on determination.

## 2025-05-22 NOTE — ASSESSMENT & PLAN NOTE
Psychiatric consulted, following. Pt for pending d/c today. 302 completed - awaiting bed per primary team   CT head without: Negative  Recently hospitalized 4/4-4/8 for encephalopathy.   Possible d/c today per primary team to appropriate  facility

## 2025-05-22 NOTE — ED NOTES
"Crisis outreached to [facility] in order to request a 303 hearing due to 302 expiring on 5/25/25. [facility] (Gloria Carlos) response was \"Per our court team's directive regarding the Holiday weekend, once the complete petition is received a 302 expiring on 5/25 would be scheduled for a 303 on Tuesday, 5/27 at 9:30 am.\"     Case management notified crisis that patient was accepted at [facility]. IP case management to coordinate 303 hearing as needed.   "

## 2025-05-22 NOTE — CASE MANAGEMENT
Case Management Discharge Planning Note    Patient name Berta Smart  Location /-01 MRN [id number]  : 1965 Date 2025       Current Admission Date: 2025  Current Admission Diagnosis:Encephalopathy   Patient Active Problem List    Diagnosis Date Noted    At high risk for electrolyte imbalance 2025    Bipolar 1 disorder with moderate shahida (HCC) 2025    Dysuria 2025    Adjustment disorder with mixed disturbance of emotions and conduct 2025    Severe protein-calorie malnutrition (HCC) 2025    Encephalopathy 2025    Urinary retention 2025    Colostomy in place (HCC) 2025    SOB (shortness of breath) 2020    Abnormal CXR 2020    Colostomy status (HCC) 2020    KRAIG (generalized anxiety disorder) 2020    Current mild episode of major depressive disorder without prior episode (HCC) 2020    Tobacco use disorder 2020    Alcoholism ([facility]) 2020    BMI less than 19,adult 2020      LOS (days): 4  Geometric Mean LOS (GMLOS) (days):   Days to GMLOS:     OBJECTIVE:  Risk of Unplanned Readmission Score: 14.49         Current admission status: Inpatient   Preferred Pharmacy:   [facility] [address] [place] [address] - [address] [place] [id number]  Phone: [phone] Fax: [phone]    Primary Care Provider: Isabella Turner MD    Primary Insurance: [employer]  Secondary Insurance:     DISCHARGE DETAILS:        Left message for Stanley at [facility] admissions to check on admission status.  Waiting for call back.

## 2025-05-22 NOTE — DISCHARGE SUPPORT
Case Management Assessment & Discharge Planning Note    Patient name Berta Smart  Location /-01 MRN [id number]  : 1965 Date 2025       Current Admission Date: 2025  Current Admission Diagnosis:Encephalopathy   Patient Active Problem List    Diagnosis Date Noted    At high risk for electrolyte imbalance 2025    Bipolar 1 disorder with moderate shahida (HCC) 2025    Dysuria 2025    Adjustment disorder with mixed disturbance of emotions and conduct 2025    Severe protein-calorie malnutrition (HCC) 2025    Encephalopathy 2025    Urinary retention 2025    Colostomy in place (HCC) 2025    SOB (shortness of breath) 2020    Abnormal CXR 2020    Colostomy status (HCC) 2020    KRAIG (generalized anxiety disorder) 2020    Current mild episode of major depressive disorder without prior episode (HCC) 2020    Tobacco use disorder 2020    Alcoholism (HCC) 2020    BMI less than 19,adult 2020      LOS (days): 4  Geometric Mean LOS (GMLOS) (days):   Days to GMLOS:   Transport Auth Initiated  [facility] was tasked to submit transport authorization by Care Manager: Jazzmine Yoder  Date of Transport: 25  Type of Transport: BLS ()  Transport Company Name: [email or web address]  Transport Company NPI: [id number]  Start Location: [facility]  End Location: [facility]  Medical Necessity: 302  inpt psych placement, Bipolar Disorder  Authorization initiated by contacting insurance:  ([place] [employer])  Submitted via: Fax ([phone])   notified: Jazzmine Yoder     Please reach out to  for updates on any clinical information.

## 2025-05-22 NOTE — CASE MANAGEMENT
Case Management Discharge Planning Note    Patient name Berta Smart  Location /-01 MRN [id number]  : 1965 Date 2025       Current Admission Date: 2025  Current Admission Diagnosis:Encephalopathy   Patient Active Problem List    Diagnosis Date Noted    At high risk for electrolyte imbalance 2025    Bipolar 1 disorder with moderate shahida (HCC) 2025    Dysuria 2025    Adjustment disorder with mixed disturbance of emotions and conduct 2025    Severe protein-calorie malnutrition (HCC) 2025    Encephalopathy 2025    Urinary retention 2025    Colostomy in place (HCC) 2025    SOB (shortness of breath) 2020    Abnormal CXR 2020    Colostomy status (HCC) 2020    KRAIG (generalized anxiety disorder) 2020    Current mild episode of major depressive disorder without prior episode (HCC) 2020    Tobacco use disorder 2020    Alcoholism ([facility]) 2020    BMI less than 19,adult 2020      LOS (days): 4  Geometric Mean LOS (GMLOS) (days):   Days to GMLOS:     OBJECTIVE:  Risk of Unplanned Readmission Score: 14.45         Current admission status: Inpatient   Preferred Pharmacy:   [facility] [address] [place] [address]  Phone: [phone] Fax: [phone]    Primary Care Provider: Isabella Turner MD    Primary Insurance: [employer]  Secondary Insurance:     DISCHARGE DETAILS:     Spoke with Ayesha from [facility], unsure of bed availability today. CM to check after 11 am .  Spoke with Stanley from [facility], they have a bed available today and requested updated labs, EKG, and UDS for additional review.   CM faxed over the requested documentation and waiting for confirmation of acceptance today.

## 2025-05-22 NOTE — PLAN OF CARE
Problem: Potential for Falls  Goal: Patient will remain free of falls  Description: INTERVENTIONS:  - Educate patient/family on patient safety including physical limitations  - Instruct patient to call for assistance with activity   - Consider consulting OT/PT to assist with strengthening/mobility based on AM PAC & JH-HLM score  - Consult OT/PT to assist with strengthening/mobility   - Keep Call bell within reach  - Keep bed low and locked with side rails adjusted as appropriate  - Keep care items and personal belongings within reach  - Initiate and maintain comfort rounds  - Make Fall Risk Sign visible to staff  - Offer Toileting every 2 Hours, in advance of need  - Initiate/Maintain alarm  - Obtain necessary fall risk management equipment  - Apply yellow socks and bracelet for high fall risk patients  - Consider moving patient to room near nurses station  Outcome: Progressing     Problem: PAIN - ADULT  Goal: Verbalizes/displays adequate comfort level or baseline comfort level  Description: Interventions:  - Encourage patient to monitor pain and request assistance  - Assess pain using appropriate pain scale  - Administer analgesics as ordered based on type and severity of pain and evaluate response  - Implement non-pharmacological measures as appropriate and evaluate response  - Consider cultural and social influences on pain and pain management  - Notify physician/advanced practitioner if interventions unsuccessful or patient reports new pain  - Educate patient/family on pain management process including their role and importance of  reporting pain   - Provide non-pharmacologic/complimentary pain relief interventions  Outcome: Progressing     Problem: INFECTION - ADULT  Goal: Absence or prevention of progression during hospitalization  Description: INTERVENTIONS:  - Assess and monitor for signs and symptoms of infection  - Monitor lab/diagnostic results  - Monitor all insertion sites, i.e. indwelling lines,  tubes, and drains  - Monitor endotracheal if appropriate and nasal secretions for changes in amount and color  - [place] appropriate cooling/warming therapies per order  - Administer medications as ordered  - Instruct and encourage patient and family to use good hand hygiene technique  - Identify and instruct in appropriate isolation precautions for identified infection/condition  Outcome: Progressing  Goal: Absence of fever/infection during neutropenic period  Description: INTERVENTIONS:  - Monitor WBC  - Perform strict hand hygiene  - Limit to healthy visitors only  - No plants, dried, fresh or silk flowers with cowan in patient room  Outcome: Progressing     Problem: SAFETY ADULT  Goal: Patient will remain free of falls  Description: INTERVENTIONS:  - Educate patient/family on patient safety including physical limitations  - Instruct patient to call for assistance with activity   - Consider consulting OT/PT to assist with strengthening/mobility based on AM PAC & -HLM score  - Consult OT/PT to assist with strengthening/mobility   - Keep Call bell within reach  - Keep bed low and locked with side rails adjusted as appropriate  - Keep care items and personal belongings within reach  - Initiate and maintain comfort rounds  - Make Fall Risk Sign visible to staff  - Offer Toileting every 2 Hours, in advance of need  - Initiate/Maintain alarm  - Obtain necessary fall risk management equipment  - Apply yellow socks and bracelet for high fall risk patients  - Consider moving patient to room near nurses station  Outcome: Progressing  Goal: Maintain or return to baseline ADL function  Description: INTERVENTIONS:  -  Assess patient's ability to carry out ADLs; assess patient's baseline for ADL function and identify physical deficits which impact ability to perform ADLs (bathing, care of mouth/teeth, toileting, grooming, dressing, etc.)  - Assess/evaluate cause of self-care deficits   - Assess range of motion  - Assess  patient's mobility; develop plan if impaired  - Assess patient's need for assistive devices and provide as appropriate  - Encourage maximum independence but intervene and supervise when necessary  - Involve family in performance of ADLs  - Assess for home care needs following discharge   - Consider OT consult to assist with ADL evaluation and planning for discharge  - Provide patient education as appropriate  - Monitor functional capacity and physical performance, use of AM PAC & JH-HLM   - Monitor gait, balance and fatigue with ambulation    Outcome: Progressing  Goal: Maintains/Returns to pre admission functional level  Description: INTERVENTIONS:  - Perform AM-PAC 6 Click Basic Mobility/ Daily Activity assessment daily.  - Set and communicate daily mobility goal to care team and patient/family/caregiver.   - Collaborate with rehabilitation services on mobility goals if consulted  - Perform Range of Motion 3 times a day.  - Reposition patient every 3 hours.  - Dangle patient 3 times a day  - Stand patient 3 times a day  - Ambulate patient 3 times a day  - Out of bed to chair 3 times a day   - Out of bed for meals 3 times a day  - Out of bed for toileting  - Record patient progress and toleration of activity level   Outcome: Progressing     Problem: DISCHARGE PLANNING  Goal: Discharge to home or other facility with appropriate resources  Description: INTERVENTIONS:  - Identify barriers to discharge w/patient and caregiver  - Arrange for needed discharge resources and transportation as appropriate  - Identify discharge learning needs (meds, wound care, etc.)  - Arrange for interpretive services to assist at discharge as needed  - Refer to Case Management Department for coordinating discharge planning if the patient needs post-hospital services based on physician/advanced practitioner order or complex needs related to functional status, cognitive ability, or social support system  Outcome: Progressing     Problem:  Knowledge Deficit  Goal: Patient/family/caregiver demonstrates understanding of disease process, treatment plan, medications, and discharge instructions  Description: Complete learning assessment and assess knowledge base.  Interventions:  - Provide teaching at level of understanding  - Provide teaching via preferred learning methods  Outcome: Progressing     Problem: Nutrition/Hydration-ADULT  Goal: Nutrient/Hydration intake appropriate for improving, restoring or maintaining nutritional needs  Description: Monitor and assess patient's nutrition/hydration status for malnutrition. Collaborate with interdisciplinary team and initiate plan and interventions as ordered.  Monitor patient's weight and dietary intake as ordered or per policy. Utilize nutrition screening tool and intervene as necessary. Determine patient's food preferences and provide high-protein, high-caloric foods as appropriate.     INTERVENTIONS:  - Monitor oral intake, urinary output, labs, and treatment plans  - Assess nutrition and hydration status and recommend course of action  - Evaluate amount of meals eaten  - Assist patient with eating if necessary   - Allow adequate time for meals  - Recommend/ encourage appropriate diets, oral nutritional supplements, and vitamin/mineral supplements  - Order, calculate, and assess calorie counts as needed  - Recommend, monitor, and adjust tube feedings and TPN/PPN based on assessed needs  - Assess need for intravenous fluids  - Provide specific nutrition/hydration education as appropriate  - Include patient/family/caregiver in decisions related to nutrition  Outcome: Progressing     Problem: Prexisting or High Potential for Compromised Skin Integrity  Goal: Skin integrity is maintained or improved  Description: INTERVENTIONS:  - Identify patients at risk for skin breakdown  - Assess and monitor skin integrity including under and around medical devices   - Assess and monitor nutrition and hydration status  -  Monitor labs  - Assess for incontinence   - Turn and reposition patient  - Assist with mobility/ambulation  - Relieve pressure over canelo prominences   - Avoid friction and shearing  - Provide appropriate hygiene as needed including keeping skin clean and dry  - Evaluate need for skin moisturizer/barrier cream  - Collaborate with interdisciplinary team  - Patient/family teaching  - Consider wound care consult    Assess:  - Review William scale daily  - Clean and moisturize skin every shift  - Inspect skin when repositioning, toileting, and assisting with ADLS  - Assess extremities for adequate circulation and sensation     Bed Management:  - Have minimal linens on bed & keep smooth, unwrinkled  - Change linens as needed when moist or perspiring  - Avoid sitting or lying in one position for more than 2 hours while in bed?Keep HOB at 30 degrees   - Toileting:  - Offer bedside commode  - Assess for incontinence every 2 hours     Activity:  - Mobilize patient 3 times a day  - Encourage activity and walks on unit  - Encourage or provide ROM exercises   - Turn and reposition patient every 2 Hours  - Use appropriate equipment to lift or move patient in bed  - Consider limitation of chair time 2 hour intervals    Skin Care:  - Avoid use of baby powder, tape, friction and shearing, hot water or constrictive clothing  - Relieve pressure over bony prominences using allevyns  - Do not massage red bony areas      Outcome: Progressing

## 2025-05-23 PROBLEM — F10.20 ALCOHOLISM (HCC): Status: RESOLVED | Noted: 2020-07-30 | Resolved: 2025-05-23

## 2025-05-23 PROBLEM — L89.90 BED SORE: Status: ACTIVE | Noted: 2025-05-23

## 2025-05-23 PROBLEM — F43.10 PTSD (POST-TRAUMATIC STRESS DISORDER): Status: ACTIVE | Noted: 2025-05-23

## 2025-05-23 PROBLEM — R56.9 SEIZURE (HCC): Status: ACTIVE | Noted: 2025-05-23

## 2025-05-23 PROBLEM — F31.13 BIPOLAR DISORDER, CURRENT EPISODE MANIC, SEVERE (HCC): Status: ACTIVE | Noted: 2025-05-19

## 2025-05-23 PROBLEM — Z00.8 MEDICAL CLEARANCE FOR PSYCHIATRIC ADMISSION: Status: ACTIVE | Noted: 2025-05-23

## 2025-05-23 PROBLEM — F10.931 ALCOHOL WITHDRAWAL DELIRIUM (HCC): Status: ACTIVE | Noted: 2025-05-23

## 2025-05-23 PROBLEM — E87.1 HYPONATREMIA: Status: ACTIVE | Noted: 2025-05-23

## 2025-05-23 PROBLEM — Z87.898 HISTORY OF ALCOHOL USE DISORDER: Status: ACTIVE | Noted: 2025-05-23

## 2025-05-23 LAB
25(OH)D3 SERPL-MCNC: 13.4 NG/ML (ref 30–100)
ALBUMIN SERPL BCG-MCNC: 3.6 G/DL (ref 3.5–5)
ALP SERPL-CCNC: 52 U/L (ref 34–104)
ALT SERPL W P-5'-P-CCNC: 7 U/L (ref 7–52)
ANION GAP SERPL CALCULATED.3IONS-SCNC: 9 MMOL/L (ref 4–13)
AST SERPL W P-5'-P-CCNC: 12 U/L (ref 13–39)
BASOPHILS # BLD AUTO: 0.08 THOUSANDS/ÂΜL (ref 0–0.1)
BASOPHILS NFR BLD AUTO: 1 % (ref 0–1)
BILIRUB SERPL-MCNC: 0.34 MG/DL (ref 0.2–1)
BUN SERPL-MCNC: 18 MG/DL (ref 5–25)
CALCIUM SERPL-MCNC: 9.3 MG/DL (ref 8.4–10.2)
CHLORIDE SERPL-SCNC: 97 MMOL/L (ref 96–108)
CO2 SERPL-SCNC: 27 MMOL/L (ref 21–32)
CREAT SERPL-MCNC: 0.38 MG/DL (ref 0.6–1.3)
EOSINOPHIL # BLD AUTO: 0.83 THOUSAND/ÂΜL (ref 0–0.61)
EOSINOPHIL NFR BLD AUTO: 11 % (ref 0–6)
ERYTHROCYTE [DISTWIDTH] IN BLOOD BY AUTOMATED COUNT: 13.2 % (ref 11.6–15.1)
FOLATE SERPL-MCNC: 17.1 NG/ML
GFR SERPL CREATININE-BSD FRML MDRD: 115 ML/MIN/1.73SQ M
GLUCOSE P FAST SERPL-MCNC: 85 MG/DL (ref 65–99)
GLUCOSE SERPL-MCNC: 85 MG/DL (ref 65–140)
HCT VFR BLD AUTO: 40.1 % (ref 34.8–46.1)
HGB BLD-MCNC: 13.3 G/DL (ref 11.5–15.4)
IMM GRANULOCYTES # BLD AUTO: 0.05 THOUSAND/UL (ref 0–0.2)
IMM GRANULOCYTES NFR BLD AUTO: 1 % (ref 0–2)
LYMPHOCYTES # BLD AUTO: 2.68 THOUSANDS/ÂΜL (ref 0.6–4.47)
LYMPHOCYTES NFR BLD AUTO: 35 % (ref 14–44)
MAGNESIUM SERPL-MCNC: 1.9 MG/DL (ref 1.9–2.7)
MCH RBC QN AUTO: 30.9 PG (ref 26.8–34.3)
MCHC RBC AUTO-ENTMCNC: 33.2 G/DL (ref 31.4–37.4)
MCV RBC AUTO: 93 FL (ref 82–98)
MONOCYTES # BLD AUTO: 0.64 THOUSAND/ÂΜL (ref 0.17–1.22)
MONOCYTES NFR BLD AUTO: 9 % (ref 4–12)
NEUTROPHILS # BLD AUTO: 3.29 THOUSANDS/ÂΜL (ref 1.85–7.62)
NEUTS SEG NFR BLD AUTO: 43 % (ref 43–75)
NRBC BLD AUTO-RTO: 0 /100 WBCS
PHOSPHATE SERPL-MCNC: 4.4 MG/DL (ref 2.3–4.1)
PLATELET # BLD AUTO: 330 THOUSANDS/UL (ref 149–390)
PMV BLD AUTO: 9.6 FL (ref 8.9–12.7)
POTASSIUM SERPL-SCNC: 4.2 MMOL/L (ref 3.5–5.3)
PROT SERPL-MCNC: 6.8 G/DL (ref 6.4–8.4)
RBC # BLD AUTO: 4.3 MILLION/UL (ref 3.81–5.12)
SODIUM SERPL-SCNC: 133 MMOL/L (ref 135–147)
T4 FREE SERPL-MCNC: 0.87 NG/DL (ref 0.61–1.12)
TSH SERPL DL<=0.05 MIU/L-ACNC: 9.73 UIU/ML (ref 0.45–4.5)
VALPROATE SERPL-MCNC: 44 UG/ML (ref 50–125)
VIT B12 SERPL-MCNC: 464 PG/ML (ref 180–914)
WBC # BLD AUTO: 7.57 THOUSAND/UL (ref 4.31–10.16)

## 2025-05-23 PROCEDURE — 84439 ASSAY OF FREE THYROXINE: CPT

## 2025-05-23 PROCEDURE — 84100 ASSAY OF PHOSPHORUS: CPT

## 2025-05-23 PROCEDURE — 99254 IP/OBS CNSLTJ NEW/EST MOD 60: CPT | Performed by: NURSE PRACTITIONER

## 2025-05-23 PROCEDURE — 97163 PT EVAL HIGH COMPLEX 45 MIN: CPT

## 2025-05-23 PROCEDURE — 84443 ASSAY THYROID STIM HORMONE: CPT

## 2025-05-23 PROCEDURE — 82607 VITAMIN B-12: CPT

## 2025-05-23 PROCEDURE — 82746 ASSAY OF FOLIC ACID SERUM: CPT

## 2025-05-23 PROCEDURE — 83735 ASSAY OF MAGNESIUM: CPT

## 2025-05-23 PROCEDURE — 85025 COMPLETE CBC W/AUTO DIFF WBC: CPT

## 2025-05-23 PROCEDURE — 99223 1ST HOSP IP/OBS HIGH 75: CPT | Performed by: STUDENT IN AN ORGANIZED HEALTH CARE EDUCATION/TRAINING PROGRAM

## 2025-05-23 PROCEDURE — 80164 ASSAY DIPROPYLACETIC ACD TOT: CPT

## 2025-05-23 PROCEDURE — 80053 COMPREHEN METABOLIC PANEL: CPT

## 2025-05-23 PROCEDURE — 82306 VITAMIN D 25 HYDROXY: CPT

## 2025-05-23 RX ORDER — MIRTAZAPINE 7.5 MG/1
7.5 TABLET, FILM COATED ORAL
Status: DISCONTINUED | OUTPATIENT
Start: 2025-05-23 | End: 2025-05-27

## 2025-05-23 RX ORDER — BISACODYL 10 MG
10 SUPPOSITORY, RECTAL RECTAL DAILY PRN
Status: DISCONTINUED | OUTPATIENT
Start: 2025-05-23 | End: 2025-05-24

## 2025-05-23 RX ORDER — ERGOCALCIFEROL 1.25 MG/1
50000 CAPSULE, LIQUID FILLED ORAL WEEKLY
Status: DISCONTINUED | OUTPATIENT
Start: 2025-05-23 | End: 2025-06-03 | Stop reason: HOSPADM

## 2025-05-23 RX ORDER — POLYETHYLENE GLYCOL 3350 17 G/17G
17 POWDER, FOR SOLUTION ORAL DAILY PRN
Status: DISCONTINUED | OUTPATIENT
Start: 2025-05-23 | End: 2025-06-03 | Stop reason: HOSPADM

## 2025-05-23 RX ORDER — DIVALPROEX SODIUM 125 MG/1
250 CAPSULE, COATED PELLETS ORAL 3 TIMES DAILY
Status: DISCONTINUED | OUTPATIENT
Start: 2025-05-23 | End: 2025-06-03 | Stop reason: HOSPADM

## 2025-05-23 RX ADMIN — TAMSULOSIN HYDROCHLORIDE 0.4 MG: 0.4 CAPSULE ORAL at 16:25

## 2025-05-23 RX ADMIN — FOLIC ACID 1 MG: 1 TABLET ORAL at 08:22

## 2025-05-23 RX ADMIN — DIVALPROEX SODIUM 250 MG: 125 CAPSULE, COATED PELLETS ORAL at 21:41

## 2025-05-23 RX ADMIN — DIVALPROEX SODIUM 250 MG: 125 CAPSULE ORAL at 08:22

## 2025-05-23 RX ADMIN — MULTIPLE VITAMINS W/ MINERALS TAB 1 TABLET: TAB ORAL at 08:22

## 2025-05-23 RX ADMIN — DIVALPROEX SODIUM 250 MG: 125 CAPSULE, COATED PELLETS ORAL at 16:25

## 2025-05-23 RX ADMIN — THIAMINE HCL TAB 100 MG 250 MG: 100 TAB at 08:22

## 2025-05-23 RX ADMIN — FLUTICASONE FUROATE AND VILANTEROL TRIFENATATE 1 PUFF: 100; 25 POWDER RESPIRATORY (INHALATION) at 08:30

## 2025-05-23 NOTE — TREATMENT TEAM
05/23/25 0800   Team Meeting   Meeting Type Daily Rounds   Team Members Present   Team Members Present Physician;Nurse;;   Physician Team Member Dr. Muñiz / Dr. Lopez / Dr. Sahu / LISA Bernard   Nursing Team Member Munir/Roger   Care Management Team Member Terry   Social Work Team Member Samuel   Patient/Family Present   Patient Present No   Patient's Family Present No     Patient came from , has 303 on 5/27, patient has PT and OT, patient utilizing cane at home due to neuropathy, fall and seizure precautions. Patient refused Depakote, patient had changed mental status and aggression towards family, patient malnourished and 88 pounds. Patient have angelo and ostomy bag. Patient signed XOCHITL's, hyper verbal, loud, manic. Patient told ER that she was having AH and VH though denies on unit. Patient daughter was buying supplements and antibiotics online and giving them to patient. Patient discharge is pending stabilization of mood and medications.
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
## 2025-05-23 NOTE — CONSULTS
"Consultation - Hospitalist   Name: Berta Smart 59 y.o. female I MRN: [id number]  Unit/Bed#: [facility] 649-01 I Date of Admission: 5/22/2025   Date of Service: 5/23/2025 I Hospital Day: 1   Inpatient consult for Medical Clearance for  patient  Consult performed by: SUZY Briseno  Consult ordered by: SUZY Cartwright        Physician Requesting Evaluation: Mahendra Muñiz MD   Reason for Evaluation / Principal Problem[facility]      Assessment & Plan  Medical clearance for psychiatric admission  Admission labs: CBC, CMP, TSH acceptable  Vitals stable   UA unremarkable   UDS negative   EKG reveals NSR, 80 bpm   Patient is medically cleared for admission to U and treatment of underlying psychiatric illness based on available results  Please contact SLIM with any questions or concerns  Hyponatremia  Chronic, stable   Seen by nephrology at    Possibly SIADH from SSRI  Continue fluid restriction   Add nutritional supplements   Monitor BMP and consider nephrology consult if needed   Urinary retention  Dietrich catheter placed 5/20 for retention   Per chart review patient had h/o urinary catheter for retention in the past   Continue flomax  Encourage ambulation  Consider voiding trial vs maintaining catheter on DC with outpatient urology f/u  Colostomy in place (HCC)  H/o UC s/p colon resection about 15 years ago   Monitor output   Routine care   Seizure (HCC)  Per chart review, previously on Dilantin  No long on AEDs and manages seizures \"by being in a safe place\"  Seizure precautions  Further workup pending progress   Bipolar disorder, current episode manic, severe (HCC)  Admitted to [facility]  Management per primary service   KRAIG (generalized anxiety disorder)  Admitted to [facility]  Management per primary service   Tobacco use disorder  Smoking cessation education and counseling   Nicotine patch while hospitalized   History of alcohol use disorder  Admitted to  campus with " "encephalopathy, concern for Wernicke's where she received high dose thiamine  Continue thiamine   Encourage alcohol abstinence   Severe protein-calorie malnutrition (HCC)  Malnutrition Findings:                                 BMI Findings:           Body mass index is 13.7 kg/m².   Encourage oral intake and add nutritional supplement   At high risk for electrolyte imbalance  Will monitor BMP, mag, phos routinely   PTSD (post-traumatic stress disorder)  Admitted to [facility]  Management per primary service     Please contact the SecureChat role,\" \", with any questions/concerns.   psychiatry medical provider     Collaboration of Care: Were Recommendations Directly Discussed with Primary Treatment Team? - Yes     History of Present Illness   Berta Smart is a 59 y.o. female who is originally admitted to the psychiatry service due to aggressive behaviors and medication noncompliance. We are consulted for medical clearance for admission to Behavioral Health Unit and treatment of underlying psychiatric illness.  Patient presented to [facility] brought in by EMS.  She was admitted to the medical unit for electrolyte abnormality and encephalopathy.  CT head unremarkable.  UA negative for infection.  Found to have hyponatremia for which she was seen by nephrology.  Found to have encephalopathy for which she was seen by neurology and started on a thiamine taper.  Due to patient's nonfocal neurologic exam, there was suspicion for a primary psychiatric illness.  Patient was recommended inpatient behavioral health.  She was medically stabilized and transferred to [facility] for U.  Currently, she is resting comfortably in bed.  She has no pain or discomfort with her colostomy or Dietrich catheter.    Review of Systems   Constitutional:  Negative for chills and fever.   HENT:  Negative for congestion.    Eyes:  Negative for visual disturbance.   Respiratory:  Negative for cough and shortness " "of breath.    Cardiovascular:  Negative for chest pain and palpitations.   Gastrointestinal:  Negative for abdominal pain, constipation, diarrhea, nausea and vomiting.   Genitourinary:  Negative for difficulty urinating.   Musculoskeletal:  Negative for arthralgias and back pain.   Skin:  Negative for wound.   Neurological:  Negative for dizziness, light-headedness and headaches.   All other systems reviewed and are negative.    Historical Information   Past Medical History[1]  Past Surgical History[2]  Social History[3]  E-Cigarette/Vaping    E-Cigarette Use Never User      E-Cigarette/Vaping Substances    Nicotine No     THC No     CBD No     Flavoring No     Other No     Unknown No      Family history non-contributory  Marital Status: /Civil Union    Meds/Allergies   I have reviewed home medications using recent Epic encounter.  Prior to Admission medications    Medication Sig Start Date End Date Taking? Authorizing Provider   Fluticasone-Salmeterol (Advair Diskus) 100-50 mcg/dose inhaler Inhale 1 puff 2 (two) times a day Rinse mouth after use. 5/9/25 6/8/25 Yes Isabella Turner MD   Thiamine HCl (vitamin B-1) 250 MG tablet Take 250 mg by mouth in the morning.   Yes Historical Provider, MD   docusate sodium (COLACE) 100 mg capsule Take 1 capsule (100 mg total) by mouth every 12 (twelve) hours  Patient not taking: Reported on 5/6/2025 4/2/25   Hao Green DO   Ostomy Supplies (Premier Colostomy/Ileostomy) KIT by Does not apply route daily  Patient not taking: Reported on 5/9/2025 8/14/20   Sheri Stephens DO   PARoxetine (PAXIL) 10 mg tablet Take 1 tablet (10 mg total) by mouth daily  Patient not taking: Reported on 5/6/2025 4/11/25   Isabella Turner MD     No Known Allergies    Objective :  Temp:  [98.2 °F (36.8 °C)-98.7 °F (37.1 °C)] 98.7 °F (37.1 °C)  HR:  [] 87  BP: (101-136)/(56-84) 101/56  Resp:  [16-20] 16  SpO2:  [95 %-100 %] 95 %  O2 Device: None (Room air)    Height: 5' 7\" (170.2 cm) " (05/22/25 2233)  Weight - Scale: 39.7 kg (87 lb 8 oz) (05/22/25 2233)  Physical Exam  Constitutional:       General: She is not in acute distress.     Appearance: She is underweight. She is ill-appearing (Thin, frail). She is not toxic-appearing or diaphoretic.   HENT:      Head: Normocephalic.      Mouth/Throat:      Mouth: Mucous membranes are moist.     Cardiovascular:      Rate and Rhythm: Normal rate.   Pulmonary:      Effort: Pulmonary effort is normal. No respiratory distress.   Abdominal:      General: Abdomen is flat. There is no distension.      Palpations: Abdomen is soft.      Comments: Colostomy bag intact   Genitourinary:     Comments: Indwelling Dietrich catheter draining clear yellow urine    Musculoskeletal:         General: Normal range of motion.      Cervical back: Normal range of motion.      Right lower leg: No edema.      Left lower leg: No edema.     Skin:     General: Skin is warm and dry.      Capillary Refill: Capillary refill takes less than 2 seconds.     Neurological:      Mental Status: She is alert and oriented to person, place, and time.     Psychiatric:         Behavior: Behavior normal.           Lab Results: I have reviewed the following results:  Results from last 7 days   Lab Units 05/23/25  0443   WBC Thousand/uL 7.57   HEMOGLOBIN g/dL 13.3   HEMATOCRIT % 40.1   PLATELETS Thousands/uL 330   SEGS PCT % 43   LYMPHO PCT % 35   MONO PCT % 9   EOS PCT % 11*     Results from last 7 days   Lab Units 05/23/25  0443   SODIUM mmol/L 133*   POTASSIUM mmol/L 4.2   CHLORIDE mmol/L 97   CO2 mmol/L 27   BUN mg/dL 18   CREATININE mg/dL 0.38*   ANION GAP mmol/L 9   CALCIUM mg/dL 9.3   ALBUMIN g/dL 3.6   TOTAL BILIRUBIN mg/dL 0.34   ALK PHOS U/L 52   ALT U/L 7   AST U/L 12*   GLUCOSE RANDOM mg/dL 85             Lab Results   Component Value Date/Time    HGBA1C 5.3 01/22/2015 05:20 AM           Imaging Results Review: I reviewed radiology reports from this admission including: CT head.  Other Study  Results Review: EKG was personally reviewed and my interpretation is: NSR. HR 80..    Administrative Statements   I have spent a total time of   minutes in caring for this patient on the day of the visit/encounter including Diagnostic results, Patient and family education, Importance of tx compliance, Risk factor reductions, Impressions, Counseling / Coordination of care, Documenting in the medical record, Reviewing/placing orders in the medical record (including tests, medications, and/or procedures), Obtaining or reviewing history  , and Communicating with other healthcare professionals .  ** Please Note: This note has been constructed using a voice recognition system. **       [1]   Past Medical History:  Diagnosis Date    Alcoholism (HCC)     Depression     Hypothyroidism     PTSD (post-traumatic stress disorder)    [2]   Past Surgical History:  Procedure Laterality Date    COLOSTOMY      CYSTOSCOPY W/ URETERAL STENT PLACEMENT Left     EXPLORATORY LAPAROTOMY      SALPINGECTOMY Bilateral 02/2025   [3]   Social History  Tobacco Use    Smoking status: Every Day     Types: Cigarettes    Smokeless tobacco: Current   Vaping Use    Vaping status: Never Used   Substance and Sexual Activity    Alcohol use: Yes     Comment: drinks beer    Drug use: Not Currently     Comment: Denies use    Sexual activity: Not Currently

## 2025-05-23 NOTE — ASSESSMENT & PLAN NOTE
Admission labs: CBC, CMP, TSH acceptable  Vitals stable   UA unremarkable   UDS negative   EKG reveals NSR, 80 bpm   Patient is medically cleared for admission to U and treatment of underlying psychiatric illness based on available results  Please contact SLIM with any questions or concerns

## 2025-05-23 NOTE — DISCHARGE INSTR - OTHER ORDERS
You are being discharged to: [address] [facility] [address]     Skin Care Plan:   1-Remedy Silicone cream/Hydraguard lotion to bilateral heels, buttocks, and sacrum twice daily and as needed.  2-Elevate heels off of bed/chair surface to offload pressure.  3-Offloading air cushion in chair when out of bed.  4-Moisturize skin twice daily with lotion.     Ostomy Care Plan:  Change ostomy pouch every 3 days and with signs of leakage (use gray 1 piece Coloplast Sensura [place] flat back pouch):  1. Peel back pouch using push-pull method  2. Use warm water only to cleanse skin around the stoma (cesar-stomal skin).  3. Make sure all adhesive residue is removed and skin is dry and not oily.  4. Measure stoma size using measuring guide and trace correct measurements onto back of pouch (1 inch round).  5. Then cut the backing of pouch out to correct shape/size.  6. Place pouch over stoma and onto skin.  7. Use warmth of hand to apply gentle pressure to help backing of pouch to adhere well to skin.  8. Empty pouch when 1/3-1/2 full of gas/stool.

## 2025-05-23 NOTE — NURSING NOTE
Patient is a 302 from [facility] 2nd floor. This RN did not get report from previous facility, attempted to call and was on hold for 10 minutes before being hung up on. Patient states she is forced to be here, and she does not plan to be here long, only going to the hospital for constipation. She was medication compliant with PO medications for this writer. She is oriented to person and place, disoriented to time and situation. She is disorganized with conversations and tangential. She states she went from a time zone with a 3 hour difference when she was admitted to the hospital and now her medication schedule is wrong. Patient is pleasant, with rambling speech. She has a colostomy bag in place and an indwelling angelo catheter. Patient has what she states to be pressure wounds on her shoulder blades and upper back. Patient has silicone mepilex padding on her b/l hips for prevention and sacrum. She has blanchable redness on her sacrum. She is able to make her needs known. Bed alarm in place. Safety checks ongoing.

## 2025-05-23 NOTE — TREATMENT TEAM
05/22/25 2147   Provider Notification   Reason for Communication Admission   Provider Name Malou Valadez MD   Provider Role Other (Comment)  (Amwell)   Method of Communication Other (Comment)  (Secure chat)   Response Waiting for response   Notification Time 2147     Provider notified of PTA med list completed with patient
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
## 2025-05-23 NOTE — PLAN OF CARE
Problem: DISCHARGE PLANNING - CARE MANAGEMENT  Goal: Discharge to post-acute care or home with appropriate resources  Description: INTERVENTIONS:  - Conduct assessment to determine patient/family and health care team treatment goals, and need for post-acute services based on payer coverage, community resources, and patient preferences, and barriers to discharge  - Address psychosocial, clinical, and financial barriers to discharge as identified in assessment in conjunction with the patient/family and health care team  - Arrange appropriate level of post-acute services according to patient’s   needs and preference and payer coverage in collaboration with the physician and health care team  - Communicate with and update the patient/family, physician, and health care team regarding progress on the discharge plan  - Arrange appropriate transportation to post-acute venues  Outcome: Not Progressing  Patient discharge planning pending mood and medication stabilization.

## 2025-05-23 NOTE — WOUND OSTOMY CARE
Wound/ostomy team consulted for ostomy care. Patient can be seen in person the next time wound/ostomy team is present on campus.         Parvin OLIVER, RN, CWOCN      Addendum 10:48: Primary RN secure chat about wounds. Photos in media viewed and noted skin is intact, however patient has thin bony frame. Recommending preventative foams to sacrum and back. Wound care will follow in person when present on campus. Ostomy and skin care orders have been placed.     Skin care Plan:  1-Cleanse sacral/buttocks and back with soap and water. Apply Silicone bordered foam, lucy P for prevention, and change every 3 days and PRN soilage/displacement  2-Turn/reposition q2h or when medically stable for pressure re-distribution on skin .  3-Elevate heels to offload pressure  4-Moisturize skin daily with skin nourishing cream  5-Ehob cushion in chair when out of bed.  6-Hydraguard to bilateral heels 2 times a day and PRN.       Ostomy Care:  1. Peel back pouch using push-pull method, may use non-alcohol adhesive remover(Purple and white package).  2. Use warm water only to cleanse skin around the stoma (cesar-stomal skin).  3. Make sure all adhesive residue is removed and skin is dry and not oily.  4. Measure stoma size using measuring guide and trace correct measurements onto back of pouch (Off set opening away from abdominal incision).   5. Then cut backing of pouch out to correct shape/size.  6. Use 3M no sting barrier film to prep the skin around the stoma.  7. Place pouch over stoma and onto skin.  8. Use warmth of hand to apply gentle pressure to help backing of pouch to adhere well to skin.                Parvin OLIVER, RN, CWOCN

## 2025-05-23 NOTE — TREATMENT TEAM
"   05/23/25 1338   Team Meeting   Meeting Type Tx Team Meeting   Initial Conference Date 05/23/25   Next Conference Date 06/22/25   Team Members Present   Team Members Present Physician;Nurse;   Physician Team Member Dr. Muñiz   Nursing Team Member Shayla   [facility] Management Team Member Asuncion   Patient/Family Present   Patient Present Yes   Patient's Family Present No     Treatment plan and goals reviewed with patient. Patient understood treatment plan and stated that she will be \"defiant\" due to the structured times of meals and groups. Patient informed CM that she will only attend groups \"if I feel that I need to, because I know how I feel, not you\". Patient refused to sign treatment plan.  "

## 2025-05-23 NOTE — ASSESSMENT & PLAN NOTE
Malnutrition Findings:                                 BMI Findings:           Body mass index is 13.7 kg/m².   Encourage oral intake and add nutritional supplement

## 2025-05-23 NOTE — UTILIZATION REVIEW
NOTIFICATION OF ADMISSION DISCHARGE   This is a Notification of Discharge from [facility]. Please be advised that this patient has been discharge from our facility. Below you will find the admission and discharge date and time including the patient’s disposition.   UTILIZATION REVIEW CONTACT:  Utilization Review Assistants  Network Utilization Review Department  Phone: [phone] x carefully listen to the prompts. All voicemails are confidential.  Email: [email or web address]     ADMISSION INFORMATION  PRESENTATION DATE: 5/18/2025  9:34 AM  OBERVATION ADMISSION DATE: N/A  INPATIENT ADMISSION DATE: 5/18/25 12:03 PM   DISCHARGE DATE: 5/22/2025  9:05 PM   DISPOSITION:[facility] Utilization Review Department  ATTENTION: Please call with any questions or concerns to [phone] and carefully listen to the prompts so that you are directed to the right person. All voicemails are confidential.   For Discharge needs, contact Care Management DC Support Team at [phone] opt. 2  Send all requests for admission clinical reviews, approved or denied determinations and any other requests to dedicated fax number below belonging to the campus where the patient is receiving treatment. List of dedicated fax numbers for the Facilities:  FACILITY NAME UR FAX NUMBER   ADMISSION DENIALS (Administrative/Medical Necessity) [phone]   DISCHARGE SUPPORT TEAM ([facility]) [phone]   PARENT CHILD HEALTH (Maternity/NICU/Pediatrics) [phone]   [facility] [phone]   [facility] [phone]   [facility] [phone]   [facility] [phone]   Counts include 234 beds at the [facility] [phone]   [facility] [phone]   [facility] [phone]   [facility] [phone]   [facility]  [facility] [phone]   [facility] [phone]   [facility] [phone]   [facility] [phone]

## 2025-05-23 NOTE — PLAN OF CARE
Problem: PAIN - ADULT  Goal: Verbalizes/displays adequate comfort level or baseline comfort level  Description: Interventions:  - Encourage patient to monitor pain and request assistance  - Assess pain using appropriate pain scale  - Administer analgesics as ordered based on type and severity of pain and evaluate response  - Implement non-pharmacological measures as appropriate and evaluate response  - Consider cultural and social influences on pain and pain management  - Notify physician/advanced practitioner if interventions unsuccessful or patient reports new pain  - Educate patient/family on pain management process including their role and importance of  reporting pain   - Provide non-pharmacologic/complimentary pain relief interventions  Outcome: Adequate for Discharge     Problem: INFECTION - ADULT  Goal: Absence or prevention of progression during hospitalization  Description: INTERVENTIONS:  - Assess and monitor for signs and symptoms of infection  - Monitor lab/diagnostic results  - Monitor all insertion sites, i.e. indwelling lines, tubes, and drains  - Monitor endotracheal if appropriate and nasal secretions for changes in amount and color  - Elba appropriate cooling/warming therapies per order  - Administer medications as ordered  - Instruct and encourage patient and family to use good hand hygiene technique  - Identify and instruct in appropriate isolation precautions for identified infection/condition  Outcome: Adequate for Discharge  Goal: Absence of fever/infection during neutropenic period  Description: INTERVENTIONS:  - Monitor WBC  - Perform strict hand hygiene  - Limit to healthy visitors only  - No plants, dried, fresh or silk flowers with cowan in patient room  Outcome: Adequate for Discharge     Problem: SAFETY ADULT  Goal: Patient will remain free of falls  Description: INTERVENTIONS:  - Educate patient/family on patient safety including physical limitations  - Instruct patient to call  for assistance with activity   - Consider consulting OT/PT to assist with strengthening/mobility based on AM PAC & JH-HLM score  - Consult OT/PT to assist with strengthening/mobility   - Keep Call bell within reach  - Keep bed low and locked with side rails adjusted as appropriate  - Keep care items and personal belongings within reach  - Initiate and maintain comfort rounds  - Make Fall Risk Sign visible to staff  - Offer Toileting every 2 Hours, in advance of need  - Initiate/Maintain bed alarm  - Obtain necessary fall risk management equipment:   - Apply yellow socks and bracelet for high fall risk patients  - Consider moving patient to room near nurses station  Outcome: Adequate for Discharge  Goal: Maintain or return to baseline ADL function  Description: INTERVENTIONS:  -  Assess patient's ability to carry out ADLs; assess patient's baseline for ADL function and identify physical deficits which impact ability to perform ADLs (bathing, care of mouth/teeth, toileting, grooming, dressing, etc.)  - Assess/evaluate cause of self-care deficits   - Assess range of motion  - Assess patient's mobility; develop plan if impaired  - Assess patient's need for assistive devices and provide as appropriate  - Encourage maximum independence but intervene and supervise when necessary  - Involve family in performance of ADLs  - Assess for home care needs following discharge   - Consider OT consult to assist with ADL evaluation and planning for discharge  - Provide patient education as appropriate  - Monitor functional capacity and physical performance, use of AM PAC & JH-HLM   - Monitor gait, balance and fatigue with ambulation    Outcome: Adequate for Discharge  Goal: Maintains/Returns to pre admission functional level  Description: INTERVENTIONS:  - Perform AM-PAC 6 Click Basic Mobility/ Daily Activity assessment daily.  - Set and communicate daily mobility goal to care team and patient/family/caregiver.   - Collaborate with  rehabilitation services on mobility goals if consulted    - Dangle patient 3 times a day  - Stand patient 3 times a day  - Ambulate patient 3 times a day  - Out of bed to chair 3 times a day   - Out of bed for meals 3 times a day  - Out of bed for toileting  - Record patient progress and toleration of activity level   Outcome: Adequate for Discharge     Problem: DISCHARGE PLANNING  Goal: Discharge to home or other facility with appropriate resources  Description: INTERVENTIONS:  - Identify barriers to discharge w/patient and caregiver  - Arrange for needed discharge resources and transportation as appropriate  - Identify discharge learning needs (meds, wound care, etc.)  - Arrange for interpretive services to assist at discharge as needed  - Refer to Case Management Department for coordinating discharge planning if the patient needs post-hospital services based on physician/advanced practitioner order or complex needs related to functional status, cognitive ability, or social support system  Outcome: Adequate for Discharge     Problem: Knowledge Deficit  Goal: Patient/family/caregiver demonstrates understanding of disease process, treatment plan, medications, and discharge instructions  Description: Complete learning assessment and assess knowledge base.  Interventions:  - Provide teaching at level of understanding  - Provide teaching via preferred learning methods  Outcome: Adequate for Discharge     Problem: GASTROINTESTINAL - ADULT  Goal: Establish and maintain optimal ostomy function  Description: INTERVENTIONS:  - Assess bowel function  - Encourage oral fluids to ensure adequate hydration  - Administer IV fluids if ordered to ensure adequate hydration   - Administer ordered medications as needed  - Encourage mobilization and activity  - Nutrition services referral to assist patient with appropriate food choices  - Assess stoma site  - Consider wound care consult   Outcome: Progressing     Problem: GENITOURINARY  - ADULT  Goal: Urinary catheter remains patent  Description: INTERVENTIONS:  - Assess patency of urinary catheter  - If patient has a chronic angelo, consider changing catheter if non-functioning  - Follow guidelines for intermittent irrigation of non-functioning urinary catheter  Outcome: Progressing     Problem: SKIN/TISSUE INTEGRITY - ADULT  Goal: Skin Integrity remains intact(Skin Breakdown Prevention)  Description: Assess:  -Perform William assessment every shift  -Clean and moisturize skin every day  -Inspect skin when repositioning, toileting, and assisting with ADLS  -Assess under medical devices such as ostomy every shift  -Assess extremities for adequate circulation and sensation     Bed Management:  -Have minimal linens on bed & keep smooth, unwrinkled  -Change linens as needed when moist or perspiring      Activity:  -Mobilize patient 3 times a day  -Encourage activity and walks on unit    -Use appropriate equipment to lift or move patient in bed          Outcome: Progressing     Problem: Ineffective Coping  Goal: Cooperates with admission process  Description: Interventions:   - Complete admission process  Outcome: Completed

## 2025-05-23 NOTE — TREATMENT PLAN
TREATMENT PLAN REVIEW - Behavioral Health Berta Smart 59 y.o. 1965 female MRN: [id number]    [facility] 6B [facility] Encounter: [id number]          Admit Date/Time:  5/22/2025  9:33 PM    Treatment Team:   MD Xiao Herrmann RN Briana Faulstick Briana Faulstick Karissa Marie Kormandy, CRNP Kiersten Bealer    Diagnosis: Principal Problem:    Bipolar disorder, current episode manic, severe (HCC)  Active Problems:    KRAIG (generalized anxiety disorder)    PTSD (post-traumatic stress disorder)    History of alcohol use disorder    Seizure (HCC)      Patient Strengths/Assets: family ties, negotiates basic needs    Patient Barriers/Limitations: difficulty adapting, family conflict, limited insight, limited support system, medical problems, patient is on an involuntary commitment, poor insight, poor reasoning ability, poor self-care, poor support system, substance abuse    Short Term Goals: decrease in paranoid thoughts, decrease in level of agitation, ability to stay safe on the unit, ability to stay free of restraints, improvement in ability to express basic needs, improvement in insight, improvement in reality testing, improvement in reasoning ability, improvement in self care, sleep improvement, improvement in appetite, mood stabilization, acceptance of need for psychiatric treatment    Long Term Goals: stabilization of mood, free of suicidal thoughts, free of homicidal thoughts, resolution of psychotic symptoms, improvement in reality testing, improvement in reasoning ability, improved insight, acceptance of need for psychiatric medications, acceptance of need for psychiatric follow up after discharge, acceptance of psychiatric diagnosis, adequate self care, appropriate interaction with family, stable living arrangements upon discharge    Progress Towards Goals: starting psychiatric medications  as prescribed    Recommended Treatment: medication management, patient medication education, group therapy, milieu therapy, continued Behavioral Health psychiatric evaluation/assessment process    Treatment Frequency: daily medication monitoring, group and milieu therapy daily, monitoring through interdisciplinary rounds, monitoring through weekly patient care conferences    Expected Discharge Date:  14 days    Discharge Plan: referral for outpatient medication management with a psychiatrist, referral for outpatient psychotherapy, return to previous living arrangement vs. Considering higher level of care    Treatment Plan Created/Updated By: Mahendra Muñiz MD

## 2025-05-23 NOTE — ASSESSMENT & PLAN NOTE
Admitted to [facility] with encephalopathy, concern for Wernicke's where she received high dose thiamine  Continue thiamine   Encourage alcohol abstinence

## 2025-05-23 NOTE — ASSESSMENT & PLAN NOTE
Malnutrition Findings:          Body mass index is 13.7 kg/m².   - nutritional support as indicated
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
## 2025-05-23 NOTE — ASSESSMENT & PLAN NOTE
Admitted to [facility] with encephalopathy, concern for Wernicke's where she received high dose thiamine  Continue thiamine   Encourage alcohol abstinence

## 2025-05-23 NOTE — ASSESSMENT & PLAN NOTE
- MVI, B12/folate and thiamine  - May consider adding Neurontin as indicated  - Benefits from referral to outpatient dual diagnosis services upon further inpatient stabilization

## 2025-05-23 NOTE — CASE MANAGEMENT
CM met with patient to discuss 303 hearing to be held with [facility] on 5/27/25. CM informed patient of rights. Patient did not appear to understand rights.  Patient then requested to call her sister, CM informed patient to utilzie patient phones at the end of the hallway. Patient informed CM that she was given the number of her family members and they were put on the tray, though the tray of breakfast food was never actually thrown away. Patient stated that the staff were documenting how much she ate to put in for financial data to make sure the administrators were aware of how much waste is being had in the facility.    JNAINE sent 303 petition to [email or web address]

## 2025-05-23 NOTE — NURSING NOTE
"Patient is cooperative, easy irritable and  constricted. She is complaint with her medications. Denies all psych S/S. Patient is preoccupied with donating all her clothing  to the \"back closet\". She denies complains of pain. Educated patient on caring for her colostomy bag, and proper catheter care patient was not receptive and stated \"I know this\"   "

## 2025-05-23 NOTE — PLAN OF CARE
Problem: GASTROINTESTINAL - ADULT  Goal: Establish and maintain optimal ostomy function  Description: INTERVENTIONS:  - Assess bowel function  - Encourage oral fluids to ensure adequate hydration  - Administer IV fluids if ordered to ensure adequate hydration   - Administer ordered medications as needed  - Encourage mobilization and activity  - Nutrition services referral to assist patient with appropriate food choices  - Assess stoma site  - Consider wound care consult   Outcome: Progressing     Problem: GENITOURINARY - ADULT  Goal: Urinary catheter remains patent  Description: INTERVENTIONS:  - Assess patency of urinary catheter  - If patient has a chronic angelo, consider changing catheter if non-functioning  - Follow guidelines for intermittent irrigation of non-functioning urinary catheter  Outcome: Progressing     Problem: SKIN/TISSUE INTEGRITY - ADULT  Goal: Skin Integrity remains intact(Skin Breakdown Prevention)  Description: Assess:  -Perform William assessment every shift  -Clean and moisturize skin every day  -Inspect skin when repositioning, toileting, and assisting with ADLS  -Assess under medical devices such as ostomy every shift  -Assess extremities for adequate circulation and sensation     Bed Management:  -Have minimal linens on bed & keep smooth, unwrinkled  -Change linens as needed when moist or perspiring      Activity:  -Mobilize patient 3 times a day  -Encourage activity and walks on unit    -Use appropriate equipment to lift or move patient in bed          Outcome: Progressing     Problem: Alteration in Thoughts and Perception  Goal: Treatment Goal: Gain control of psychotic behaviors/thinking, reduce/eliminate presenting symptoms and demonstrate improved reality functioning upon discharge  Outcome: Progressing     Problem: Depression  Goal: Treatment Goal: Demonstrate behavioral control of depressive symptoms, verbalize feelings of improved mood/affect, and adopt new coping skills prior to  discharge  Outcome: Progressing     Problem: Anxiety  Goal: Anxiety is at manageable level  Description: Interventions:  - Assess and monitor patient's anxiety level.   - Monitor for signs and symptoms (heart palpitations, chest pain, shortness of breath, headaches, nausea, feeling jumpy, restlessness, irritable, apprehensive).   - Collaborate with interdisciplinary team and initiate plan and interventions as ordered.  - [place] patient to unit/surroundings  - Explain treatment plan  - Encourage participation in care  - Encourage verbalization of concerns/fears  - Identify coping mechanisms  - Assist in developing anxiety-reducing skills  - Administer/offer alternative therapies  - Limit or eliminate stimulants  Outcome: Progressing

## 2025-05-23 NOTE — PROGRESS NOTES
Prior RN attempted to give report to receiving facility multiple times. This RN does not have phone number to give report. Pt's IV was removed. Pt was given PO zyprexa 30 mins prior to  time.

## 2025-05-23 NOTE — ASSESSMENT & PLAN NOTE
Dietrich catheter placed 5/20 for retention   Per chart review patient had h/o urinary catheter for retention in the past   Continue flomax  Encourage ambulation  Consider voiding trial vs maintaining catheter on DC with outpatient urology f/u

## 2025-05-23 NOTE — CASE MANAGEMENT
CM place call to [facility] worker [phone] Maritza. JANINE left message notifying Maritza that patient did not sign XOCHITL though that CM would appreciate being informed regarding patient's discharge plan and if it would be safe for patient to return home.

## 2025-05-23 NOTE — CMS CERTIFICATION NOTE
Certification: Based upon physical, mental and social evaluations, I certify that inpatient psychiatric services are medically necessary for this patient for a duration of 14 midnights for the treatment of Bipolar disorder, current episode manic, severe (HCC)  Available alternative community resources do not meet the patient's mental health care needs.  I further attest that an established written individualized plan of care has been implemented and is outlined in the patient's medical records.

## 2025-05-23 NOTE — CASE MANAGEMENT
place call to Alexa (daughter) tel#[phone] to discuss scheduling visit for 5/25 at 10am. Alexa reports a concern being that patient is having a meltdown due to quitting alcohol cold turkey three months ago.

## 2025-05-23 NOTE — DISCHARGE SUPPORT
Case Management Assessment & Discharge Planning Note    Patient name Berta Smart  Location /-01 MRN [id number]  : 1965 Date 2025       Current Admission Date: 2025  Current Admission Diagnosis:Bipolar 1 disorder with moderate shahida (HCC)   Patient Active Problem List    Diagnosis Date Noted    Alcohol withdrawal delirium (HCC) 2025    Bed sore 2025    Seizure (HCC) 2025    PTSD (post-traumatic stress disorder) 2025    History of alcohol use disorder 2025    At high risk for electrolyte imbalance 2025    Bipolar 1 disorder with moderate shahida (HCC) 2025    Dysuria 2025    Adjustment disorder with mixed disturbance of emotions and conduct 2025    Severe protein-calorie malnutrition (HCC) 2025    Encephalopathy 2025    Urinary retention 2025    Colostomy in place (HCC) 2025    SOB (shortness of breath) 2020    Abnormal CXR 2020    Colostomy status (HCC) 2020    KRAIG (generalized anxiety disorder) 2020    Current mild episode of major depressive disorder without prior episode (HCC) 2020    Tobacco use disorder 2020    Alcoholism (HCC) 2020    BMI less than 19,adult 2020      LOS (days): 1  Geometric Mean LOS (GMLOS) (days):   Days to GMLOS:   Transport Auth Approved  [facility] received transport authorization approval from insurance:  ([employer])  Date of Transport: 25  Type of Transport: BLS  End Location: [facility]  Approved Authorization Number: [id number]   notified: Jazzmine Yoder     Please reach out to  for updates on any clinical information.

## 2025-05-23 NOTE — ASSESSMENT & PLAN NOTE
Chronic, stable   Seen by nephrology at    Possibly SIADH from SSRI  Continue fluid restriction   Add nutritional supplements   Monitor BMP and consider nephrology consult if needed

## 2025-05-23 NOTE — CASE MANAGEMENT
Discussed with patient: AUDIT score of 13 UDS/Identified Substance(s) used: Alcohol  Risks discussed included: Health complications, social stressors, finance  Recommendations discussed: Continuing sobriety  Patient's response: Understood and wiling to continue sober activities.

## 2025-05-23 NOTE — NURSING NOTE
"Found patient sitting on the floor. Pt stated \"I need pants now\". Pt stated \"I put myself on the floor\" \"I know how to get off from bed with making the alarm sound\", I helped the patient up even though she stated \" I don't need your help\" she then demanded I  her dirty clothes from the floor, wash them and give them up for donation. After I gave her a clean pair of pants she went to bed.    "

## 2025-05-23 NOTE — ASSESSMENT & PLAN NOTE
"Per chart review, previously on Dilantin  No long on AEDs and manages seizures \"by being in a safe place\"  Seizure precautions  Further workup pending progress   "

## 2025-05-24 LAB
ANION GAP SERPL CALCULATED.3IONS-SCNC: 7 MMOL/L (ref 4–13)
ATRIAL RATE: 80 BPM
BUN SERPL-MCNC: 15 MG/DL (ref 5–25)
CALCIUM SERPL-MCNC: 9.5 MG/DL (ref 8.4–10.2)
CHLORIDE SERPL-SCNC: 94 MMOL/L (ref 96–108)
CO2 SERPL-SCNC: 31 MMOL/L (ref 21–32)
CREAT SERPL-MCNC: 0.36 MG/DL (ref 0.6–1.3)
GFR SERPL CREATININE-BSD FRML MDRD: 117 ML/MIN/1.73SQ M
GLUCOSE P FAST SERPL-MCNC: 95 MG/DL (ref 65–99)
GLUCOSE SERPL-MCNC: 95 MG/DL (ref 65–140)
P AXIS: 78 DEGREES
POTASSIUM SERPL-SCNC: 4 MMOL/L (ref 3.5–5.3)
PR INTERVAL: 150 MS
QRS AXIS: 257 DEGREES
QRSD INTERVAL: 80 MS
QT INTERVAL: 382 MS
QTC INTERVAL: 441 MS
SODIUM SERPL-SCNC: 132 MMOL/L (ref 135–147)
T WAVE AXIS: 63 DEGREES
VENTRICULAR RATE: 80 BPM

## 2025-05-24 PROCEDURE — 99233 SBSQ HOSP IP/OBS HIGH 50: CPT | Performed by: PSYCHIATRY & NEUROLOGY

## 2025-05-24 PROCEDURE — 93010 ELECTROCARDIOGRAM REPORT: CPT | Performed by: INTERNAL MEDICINE

## 2025-05-24 PROCEDURE — 80048 BASIC METABOLIC PNL TOTAL CA: CPT | Performed by: STUDENT IN AN ORGANIZED HEALTH CARE EDUCATION/TRAINING PROGRAM

## 2025-05-24 RX ORDER — BISACODYL 10 MG
10 SUPPOSITORY, RECTAL RECTAL DAILY PRN
Status: DISCONTINUED | OUTPATIENT
Start: 2025-05-24 | End: 2025-06-03 | Stop reason: HOSPADM

## 2025-05-24 RX ADMIN — DIVALPROEX SODIUM 250 MG: 125 CAPSULE, COATED PELLETS ORAL at 15:39

## 2025-05-24 RX ADMIN — FLUTICASONE FUROATE AND VILANTEROL TRIFENATATE 1 PUFF: 100; 25 POWDER RESPIRATORY (INHALATION) at 11:15

## 2025-05-24 RX ADMIN — DIVALPROEX SODIUM 250 MG: 125 CAPSULE, COATED PELLETS ORAL at 21:36

## 2025-05-24 RX ADMIN — FOLIC ACID 1 MG: 1 TABLET ORAL at 08:54

## 2025-05-24 RX ADMIN — TAMSULOSIN HYDROCHLORIDE 0.4 MG: 0.4 CAPSULE ORAL at 15:39

## 2025-05-24 RX ADMIN — THIAMINE HCL TAB 100 MG 250 MG: 100 TAB at 08:54

## 2025-05-24 RX ADMIN — DIVALPROEX SODIUM 250 MG: 125 CAPSULE, COATED PELLETS ORAL at 08:54

## 2025-05-24 NOTE — NURSING NOTE
"Patient was in the milieu sitting in a chair by the door demanding to be moved to her room. \"I can't walk, otherwise the stools will come out the bag\" Informed patient I was a going to help her to her room. Patient stated \" I can't wait for this bitch to go home\" educated patient on the importance of respect.   Patient colostomy bag was overflowing. I assisted patient to the bathroom. Patient was angry and very irritable. Educated patient on proper colostomy care, patient was not receptive, she stated \"I do it all the time, I'm the expert you are not\". Patient reports \"all this stools is because the multivitamin gives me diarrhea\".     Patient endorses anxiety, she stated \"I don't need any pills\" she denies depression. She reports auditory hallucination, \" I hear her voice, since 1999, when she gave me this black book\" patient was holding a black daily word book. Patient did not share what she was hearing. Patient denies visual hallucinations. Patient denies SI/HI.   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 Patient denies complains of pain, or any unmeet needs. Safety checks will be maintained.   "

## 2025-05-24 NOTE — NURSING NOTE
"Berta is withdrawn to her room. She denies all psych s/s and claims she is only here for constipation. She was calm and pleasant with rambling speech. Attempted to educate patient on caring for her colostomy bag and proper urinary catheter care which she was not open to stating, \"I know exactly what to do, I don't need any help\". She was complaint with Depakote, but refused Remeron and Melatonin. Safety measures maintained.   "
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
## 2025-05-24 NOTE — NURSING NOTE
"I answered call bell, patient stated \"you are my trigger here\" \"for example the side of your glasses say jacky\" patient requested a cup of water and to be left alone. Patient denies SI/VH/AU however I heard patient responding to internal stimuli.      "

## 2025-05-24 NOTE — ASSESSMENT & PLAN NOTE
Malnutrition Findings:   Adult Malnutrition type: Chronic illness  Adult Degree of Malnutrition: Other severe protein calorie malnutrition   Body mass index is 13.7 kg/m².   - nutritional support as indicated

## 2025-05-24 NOTE — PLAN OF CARE
Problem: GASTROINTESTINAL - ADULT  Goal: Establish and maintain optimal ostomy function  Description: INTERVENTIONS:  - Assess bowel function  - Encourage oral fluids to ensure adequate hydration  - Administer IV fluids if ordered to ensure adequate hydration   - Administer ordered medications as needed  - Encourage mobilization and activity  - Nutrition services referral to assist patient with appropriate food choices  - Assess stoma site  - Consider wound care consult   Outcome: Progressing     Problem: GENITOURINARY - ADULT  Goal: Urinary catheter remains patent  Description: INTERVENTIONS:  - Assess patency of urinary catheter  - If patient has a chronic angelo, consider changing catheter if non-functioning  - Follow guidelines for intermittent irrigation of non-functioning urinary catheter  Outcome: Progressing     Problem: SKIN/TISSUE INTEGRITY - ADULT  Goal: Skin Integrity remains intact(Skin Breakdown Prevention)  Description: Assess:  -Perform William assessment every shift  -Clean and moisturize skin every day  -Inspect skin when repositioning, toileting, and assisting with ADLS  -Assess under medical devices such as ostomy every shift  -Assess extremities for adequate circulation and sensation     Bed Management:  -Have minimal linens on bed & keep smooth, unwrinkled  -Change linens as needed when moist or perspiring      Activity:  -Mobilize patient 3 times a day  -Encourage activity and walks on unit    -Use appropriate equipment to lift or move patient in bed          Outcome: Progressing     Problem: Alteration in Thoughts and Perception  Goal: Treatment Goal: Gain control of psychotic behaviors/thinking, reduce/eliminate presenting symptoms and demonstrate improved reality functioning upon discharge  Outcome: Progressing     Problem: Ineffective Coping  Goal: Participates in unit activities  Description: Interventions:  - Provide therapeutic environment   - Provide required programming   - Redirect  inappropriate behaviors   Outcome: Progressing     Problem: Nutrition/Hydration-ADULT  Goal: Nutrient/Hydration intake appropriate for improving, restoring or maintaining nutritional needs  Description: Monitor and assess patient's nutrition/hydration status for malnutrition. Collaborate with interdisciplinary team and initiate plan and interventions as ordered.  Monitor patient's weight and dietary intake as ordered or per policy. Utilize nutrition screening tool and intervene as necessary. Determine patient's food preferences and provide high-protein, high-caloric foods as appropriate.     INTERVENTIONS:  - Monitor oral intake, urinary output, labs, and treatment plans  - Assess nutrition and hydration status and recommend course of action  - Evaluate amount of meals eaten  - Assist patient with eating if necessary   - Allow adequate time for meals  - Recommend/ encourage appropriate diets, oral nutritional supplements, and vitamin/mineral supplements  - Order, calculate, and assess calorie counts as needed  - Recommend, monitor, and adjust tube feedings and TPN/PPN based on assessed needs  - Assess need for intravenous fluids  - Provide specific nutrition/hydration education as appropriate  - Include patient/family/caregiver in decisions related to nutrition  Outcome: Progressing

## 2025-05-25 PROCEDURE — 99232 SBSQ HOSP IP/OBS MODERATE 35: CPT

## 2025-05-25 RX ORDER — LANOLIN ALCOHOL/MO/W.PET/CERES
100 CREAM (GRAM) TOPICAL DAILY
Status: DISCONTINUED | OUTPATIENT
Start: 2025-05-25 | End: 2025-05-28

## 2025-05-25 RX ADMIN — THIAMINE HCL TAB 100 MG 100 MG: 100 TAB at 09:06

## 2025-05-25 RX ADMIN — MIRTAZAPINE 7.5 MG: 7.5 TABLET, FILM COATED ORAL at 21:55

## 2025-05-25 RX ADMIN — DIVALPROEX SODIUM 250 MG: 125 CAPSULE, COATED PELLETS ORAL at 16:49

## 2025-05-25 RX ADMIN — HYDROXYZINE HYDROCHLORIDE 50 MG: 50 TABLET, FILM COATED ORAL at 19:50

## 2025-05-25 RX ADMIN — DIVALPROEX SODIUM 250 MG: 125 CAPSULE, COATED PELLETS ORAL at 09:03

## 2025-05-25 RX ADMIN — DIVALPROEX SODIUM 250 MG: 125 CAPSULE, COATED PELLETS ORAL at 21:55

## 2025-05-25 RX ADMIN — FLUTICASONE FUROATE AND VILANTEROL TRIFENATATE 1 PUFF: 100; 25 POWDER RESPIRATORY (INHALATION) at 09:03

## 2025-05-25 RX ADMIN — TAMSULOSIN HYDROCHLORIDE 0.4 MG: 0.4 CAPSULE ORAL at 16:49

## 2025-05-25 RX ADMIN — Medication 3 MG: at 21:55

## 2025-05-25 NOTE — NURSING NOTE
Patient calm, cooperative, moderately visible on milllieu after snack time socializing with peers.  She is pleasant in conversation with this nurse however has difficulty remaining on subject and will speak about various non-related topics.  She reports sleeping well and denies depression, anxiety, HI/SI/AVH and refused melatonin and remeron evening medication.  Safety plan reviewed but needs reinforcement.  Patient has no unmet needs at this time.

## 2025-05-25 NOTE — TREATMENT TEAM
"   05/25/25 1950   Higgins Anxiety Scale   Anxious Mood 3   Tension 2   Fears 3   Insomnia 0   Intellectual 1   Depressed Mood 0   Somatic Complaints: Muscular 3   Somatic Complaints: Sensory 0   Cardiovascular Symptoms 0   Respiratory Symptoms 3   Gastrointestinal Symptoms 0   Genitourinary Symptoms 0   Autonomic Symptoms 0   Behavior at Interview 4   Higgins Anxiety Score 19     Patient was sitting on floor, stating \"it is too loud here\", \"my roommate wont stop talking.\"   She is irritated and will like to remove her angelo. Hydroxyzine 50 mg given. Will reassess.   "

## 2025-05-25 NOTE — NURSING NOTE
Patient is calm and cooperative, she apologized for yesterdays behavior. She was compliant with morning medications. She remains disorganized and needs frequent redirections. She denies complains of pain, SI/AH/VH. Denies anxiety and depression. She is visible during shift, social with peers. She performed care for her angelo this am, however she refused to empty her colostomy bag, she stated she will replace the bag when its needed.reeducated patient on colostomy care, patient was not receptive.

## 2025-05-25 NOTE — PLAN OF CARE
Problem: GASTROINTESTINAL - ADULT  Goal: Establish and maintain optimal ostomy function  Description: INTERVENTIONS:  - Assess bowel function  - Encourage oral fluids to ensure adequate hydration  - Administer IV fluids if ordered to ensure adequate hydration   - Administer ordered medications as needed  - Encourage mobilization and activity  - Nutrition services referral to assist patient with appropriate food choices  - Assess stoma site  - Consider wound care consult   Outcome: Progressing     Problem: GENITOURINARY - ADULT  Goal: Urinary catheter remains patent  Description: INTERVENTIONS:  - Assess patency of urinary catheter  - If patient has a chronic angelo, consider changing catheter if non-functioning  - Follow guidelines for intermittent irrigation of non-functioning urinary catheter  Outcome: Progressing     Problem: SKIN/TISSUE INTEGRITY - ADULT  Goal: Skin Integrity remains intact(Skin Breakdown Prevention)  Description: Assess:  -Perform William assessment every shift  -Clean and moisturize skin every day  -Inspect skin when repositioning, toileting, and assisting with ADLS  -Assess under medical devices such as ostomy every shift  -Assess extremities for adequate circulation and sensation     Bed Management:  -Have minimal linens on bed & keep smooth, unwrinkled  -Change linens as needed when moist or perspiring      Activity:  -Mobilize patient 3 times a day  -Encourage activity and walks on unit    -Use appropriate equipment to lift or move patient in bed          Outcome: Progressing     Problem: Alteration in Thoughts and Perception  Goal: Treatment Goal: Gain control of psychotic behaviors/thinking, reduce/eliminate presenting symptoms and demonstrate improved reality functioning upon discharge  Outcome: Progressing     Problem: Depression  Goal: Treatment Goal: Demonstrate behavioral control of depressive symptoms, verbalize feelings of improved mood/affect, and adopt new coping skills prior to  discharge  Outcome: Progressing     Problem: Anxiety  Goal: Anxiety is at manageable level  Description: Interventions:  - Assess and monitor patient's anxiety level.   - Monitor for signs and symptoms (heart palpitations, chest pain, shortness of breath, headaches, nausea, feeling jumpy, restlessness, irritable, apprehensive).   - Collaborate with interdisciplinary team and initiate plan and interventions as ordered.  - [place] patient to unit/surroundings  - Explain treatment plan  - Encourage participation in care  - Encourage verbalization of concerns/fears  - Identify coping mechanisms  - Assist in developing anxiety-reducing skills  - Administer/offer alternative therapies  - Limit or eliminate stimulants  Outcome: Progressing     Problem: Alteration in Orientation  Goal: Treatment Goal: Demonstrate a reduction of confusion and improved orientation to person, place, time and/or situation upon discharge, according to optimum baseline/ability  Outcome: Progressing     Problem: SAFETY ADULT  Goal: Patient will remain free of falls  Description: INTERVENTIONS:  - Educate patient/family on patient safety including physical limitations  - Instruct patient to call for assistance with activity   - Consider consulting OT/PT to assist with strengthening/mobility based on AM PAC & JH-HLM score  - Consult OT/PT to assist with strengthening/mobility   - Keep Call bell within reach  - Keep bed low and locked with side rails adjusted as appropriate  - Keep care items and personal belongings within reach  - Initiate and maintain comfort rounds  - Make Fall Risk Sign visible to staff  - Apply yellow socks and bracelet for high fall risk patients  - Consider moving patient to room near nurses station  Outcome: Progressing  Goal: Maintain or return to baseline ADL function  Description: INTERVENTIONS:  -  Assess patient's ability to carry out ADLs; assess patient's baseline for ADL function and identify physical deficits which  impact ability to perform ADLs (bathing, care of mouth/teeth, toileting, grooming, dressing, etc.)  - Assess/evaluate cause of self-care deficits   - Assess range of motion  - Assess patient's mobility; develop plan if impaired  - Assess patient's need for assistive devices and provide as appropriate  - Encourage maximum independence but intervene and supervise when necessary  - Involve family in performance of ADLs  - Assess for home care needs following discharge   - Consider OT consult to assist with ADL evaluation and planning for discharge  - Provide patient education as appropriate  - Monitor functional capacity and physical performance, use of AM PAC & JH-HLM   - Monitor gait, balance and fatigue with ambulation    Outcome: Progressing  Goal: Maintains/Returns to pre admission functional level  Description: INTERVENTIONS:  - Perform AM-PAC 6 Click Basic Mobility/ Daily Activity assessment daily.  - Set and communicate daily mobility goal to care team and patient/family/caregiver.   - Collaborate with rehabilitation services on mobility goals if consulted  - Out of bed for toileting  - Record patient progress and toleration of activity level   Outcome: Progressing     Problem: DISCHARGE PLANNING  Goal: Discharge to home or other facility with appropriate resources  Description: INTERVENTIONS:  - Identify barriers to discharge w/patient and caregiver  - Arrange for needed discharge resources and transportation as appropriate  - Identify discharge learning needs (meds, wound care, etc.)  - Arrange for interpretive services to assist at discharge as needed  - Refer to Case Management Department for coordinating discharge planning if the patient needs post-hospital services based on physician/advanced practitioner order or complex needs related to functional status, cognitive ability, or social support system  Outcome: Progressing     Problem: DISCHARGE PLANNING - CARE MANAGEMENT  Goal: Discharge to post-acute  care or home with appropriate resources  Description: INTERVENTIONS:  - Conduct assessment to determine patient/family and health care team treatment goals, and need for post-acute services based on payer coverage, community resources, and patient preferences, and barriers to discharge  - Address psychosocial, clinical, and financial barriers to discharge as identified in assessment in conjunction with the patient/family and health care team  - Arrange appropriate level of post-acute services according to patient’s   needs and preference and payer coverage in collaboration with the physician and health care team  - Communicate with and update the patient/family, physician, and health care team regarding progress on the discharge plan  - Arrange appropriate transportation to post-acute venues  Outcome: Progressing     Problem: Nutrition/Hydration-ADULT  Goal: Nutrient/Hydration intake appropriate for improving, restoring or maintaining nutritional needs  Description: Monitor and assess patient's nutrition/hydration status for malnutrition. Collaborate with interdisciplinary team and initiate plan and interventions as ordered.  Monitor patient's weight and dietary intake as ordered or per policy. Utilize nutrition screening tool and intervene as necessary. Determine patient's food preferences and provide high-protein, high-caloric foods as appropriate.     INTERVENTIONS:  - Monitor oral intake, urinary output, labs, and treatment plans  - Assess nutrition and hydration status and recommend course of action  - Evaluate amount of meals eaten  - Assist patient with eating if necessary   - Allow adequate time for meals  - Recommend/ encourage appropriate diets, oral nutritional supplements, and vitamin/mineral supplements  - Order, calculate, and assess calorie counts as needed  - Recommend, monitor, and adjust tube feedings and TPN/PPN based on assessed needs  - Assess need for intravenous fluids  - Provide specific  nutrition/hydration education as appropriate  - Include patient/family/caregiver in decisions related to nutrition  Outcome: Progressing

## 2025-05-25 NOTE — ASSESSMENT & PLAN NOTE
Malnutrition Findings:   Adult Malnutrition type: Chronic illness  Adult Degree of Malnutrition: Other severe protein calorie malnutrition   Body mass index is 13.66 kg/m².   - nutritional support as indicated
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
## 2025-05-26 LAB
ALBUMIN SERPL BCG-MCNC: 3.4 G/DL (ref 3.5–5)
ALP SERPL-CCNC: 51 U/L (ref 34–104)
ALT SERPL W P-5'-P-CCNC: 9 U/L (ref 7–52)
ANION GAP SERPL CALCULATED.3IONS-SCNC: 8 MMOL/L (ref 4–13)
AST SERPL W P-5'-P-CCNC: 13 U/L (ref 13–39)
BILIRUB SERPL-MCNC: 0.43 MG/DL (ref 0.2–1)
BUN SERPL-MCNC: 17 MG/DL (ref 5–25)
CALCIUM ALBUM COR SERPL-MCNC: 9.2 MG/DL (ref 8.3–10.1)
CALCIUM SERPL-MCNC: 8.7 MG/DL (ref 8.4–10.2)
CHLORIDE SERPL-SCNC: 100 MMOL/L (ref 96–108)
CO2 SERPL-SCNC: 27 MMOL/L (ref 21–32)
CREAT SERPL-MCNC: 0.43 MG/DL (ref 0.6–1.3)
GFR SERPL CREATININE-BSD FRML MDRD: 110 ML/MIN/1.73SQ M
GLUCOSE P FAST SERPL-MCNC: 83 MG/DL (ref 65–99)
GLUCOSE SERPL-MCNC: 83 MG/DL (ref 65–140)
POTASSIUM SERPL-SCNC: 4 MMOL/L (ref 3.5–5.3)
PROT SERPL-MCNC: 6.2 G/DL (ref 6.4–8.4)
SODIUM SERPL-SCNC: 135 MMOL/L (ref 135–147)
VALPROATE SERPL-MCNC: 77 UG/ML (ref 50–125)

## 2025-05-26 PROCEDURE — 99232 SBSQ HOSP IP/OBS MODERATE 35: CPT

## 2025-05-26 PROCEDURE — 80164 ASSAY DIPROPYLACETIC ACD TOT: CPT | Performed by: STUDENT IN AN ORGANIZED HEALTH CARE EDUCATION/TRAINING PROGRAM

## 2025-05-26 PROCEDURE — 80053 COMPREHEN METABOLIC PANEL: CPT | Performed by: STUDENT IN AN ORGANIZED HEALTH CARE EDUCATION/TRAINING PROGRAM

## 2025-05-26 RX ORDER — POLYETHYLENE GLYCOL 3350 17 G/17G
17 POWDER, FOR SOLUTION ORAL DAILY
Status: DISCONTINUED | OUTPATIENT
Start: 2025-05-26 | End: 2025-05-26

## 2025-05-26 RX ADMIN — DIVALPROEX SODIUM 250 MG: 125 CAPSULE, COATED PELLETS ORAL at 21:59

## 2025-05-26 RX ADMIN — DIVALPROEX SODIUM 250 MG: 125 CAPSULE, COATED PELLETS ORAL at 08:26

## 2025-05-26 RX ADMIN — FOLIC ACID 1 MG: 1 TABLET ORAL at 08:26

## 2025-05-26 RX ADMIN — DIVALPROEX SODIUM 250 MG: 125 CAPSULE, COATED PELLETS ORAL at 15:59

## 2025-05-26 RX ADMIN — FLUTICASONE FUROATE AND VILANTEROL TRIFENATATE 1 PUFF: 100; 25 POWDER RESPIRATORY (INHALATION) at 08:57

## 2025-05-26 RX ADMIN — TAMSULOSIN HYDROCHLORIDE 0.4 MG: 0.4 CAPSULE ORAL at 15:59

## 2025-05-26 NOTE — NURSING NOTE
"Patient visible in the unit and social with staff. She refused thiamine this morning. Per pt \" I take this at home once weekly, I politely have to refused this\". She is guarded and uninterested during assessment. Patient did her own angelo care. Currently denies anxiety, depression and SI/HI. Safety checks are ongoing.  "

## 2025-05-26 NOTE — NURSING NOTE
"Patient gave her reading glasses to nursing staff and stated it is to strong for her. Per pt \"My daughter brought the incorrect one\".  "
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
## 2025-05-26 NOTE — PLAN OF CARE
Problem: GASTROINTESTINAL - ADULT  Goal: Establish and maintain optimal ostomy function  Description: INTERVENTIONS:  - Assess bowel function  - Encourage oral fluids to ensure adequate hydration  - Administer IV fluids if ordered to ensure adequate hydration   - Administer ordered medications as needed  - Encourage mobilization and activity  - Nutrition services referral to assist patient with appropriate food choices  - Assess stoma site  - Consider wound care consult   Outcome: Progressing     Problem: GENITOURINARY - ADULT  Goal: Urinary catheter remains patent  Description: INTERVENTIONS:  - Assess patency of urinary catheter  - If patient has a chronic angelo, consider changing catheter if non-functioning  - Follow guidelines for intermittent irrigation of non-functioning urinary catheter  Outcome: Progressing     Problem: SKIN/TISSUE INTEGRITY - ADULT  Goal: Skin Integrity remains intact(Skin Breakdown Prevention)  Description: Assess:  -Perform William assessment every shift  -Clean and moisturize skin every day  -Inspect skin when repositioning, toileting, and assisting with ADLS  -Assess under medical devices such as ostomy every shift  -Assess extremities for adequate circulation and sensation     Bed Management:  -Have minimal linens on bed & keep smooth, unwrinkled  -Change linens as needed when moist or perspiring      Activity:  -Mobilize patient 3 times a day  -Encourage activity and walks on unit    -Use appropriate equipment to lift or move patient in bed          Outcome: Progressing     Problem: Alteration in Thoughts and Perception  Goal: Treatment Goal: Gain control of psychotic behaviors/thinking, reduce/eliminate presenting symptoms and demonstrate improved reality functioning upon discharge  Outcome: Progressing     Problem: Depression  Goal: Treatment Goal: Demonstrate behavioral control of depressive symptoms, verbalize feelings of improved mood/affect, and adopt new coping skills prior to  discharge  Outcome: Progressing     Problem: Anxiety  Goal: Anxiety is at manageable level  Description: Interventions:  - Assess and monitor patient's anxiety level.   - Monitor for signs and symptoms (heart palpitations, chest pain, shortness of breath, headaches, nausea, feeling jumpy, restlessness, irritable, apprehensive).   - Collaborate with interdisciplinary team and initiate plan and interventions as ordered.  - [place] patient to unit/surroundings  - Explain treatment plan  - Encourage participation in care  - Encourage verbalization of concerns/fears  - Identify coping mechanisms  - Assist in developing anxiety-reducing skills  - Administer/offer alternative therapies  - Limit or eliminate stimulants  Outcome: Progressing     Problem: Alteration in Orientation  Goal: Treatment Goal: Demonstrate a reduction of confusion and improved orientation to person, place, time and/or situation upon discharge, according to optimum baseline/ability  Outcome: Progressing     Problem: SAFETY ADULT  Goal: Patient will remain free of falls  Description: INTERVENTIONS:  - Educate patient/family on patient safety including physical limitations  - Instruct patient to call for assistance with activity   - Consider consulting OT/PT to assist with strengthening/mobility based on AM PAC & JH-HLM score  - Consult OT/PT to assist with strengthening/mobility   - Keep Call bell within reach  - Keep bed low and locked with side rails adjusted as appropriate  - Keep care items and personal belongings within reach  - Initiate and maintain comfort rounds  - Make Fall Risk Sign visible to staff  - Apply yellow socks and bracelet for high fall risk patients  - Consider moving patient to room near nurses station  Outcome: Progressing  Goal: Maintain or return to baseline ADL function  Description: INTERVENTIONS:  -  Assess patient's ability to carry out ADLs; assess patient's baseline for ADL function and identify physical deficits which  impact ability to perform ADLs (bathing, care of mouth/teeth, toileting, grooming, dressing, etc.)  - Assess/evaluate cause of self-care deficits   - Assess range of motion  - Assess patient's mobility; develop plan if impaired  - Assess patient's need for assistive devices and provide as appropriate  - Encourage maximum independence but intervene and supervise when necessary  - Involve family in performance of ADLs  - Assess for home care needs following discharge   - Consider OT consult to assist with ADL evaluation and planning for discharge  - Provide patient education as appropriate  - Monitor functional capacity and physical performance, use of AM PAC & JH-HLM   - Monitor gait, balance and fatigue with ambulation    Outcome: Progressing  Goal: Maintains/Returns to pre admission functional level  Description: INTERVENTIONS:  - Perform AM-PAC 6 Click Basic Mobility/ Daily Activity assessment daily.  - Set and communicate daily mobility goal to care team and patient/family/caregiver.   - Collaborate with rehabilitation services on mobility goals if consulted  - Out of bed for toileting  - Record patient progress and toleration of activity level   Outcome: Progressing     Problem: Nutrition/Hydration-ADULT  Goal: Nutrient/Hydration intake appropriate for improving, restoring or maintaining nutritional needs  Description: Monitor and assess patient's nutrition/hydration status for malnutrition. Collaborate with interdisciplinary team and initiate plan and interventions as ordered.  Monitor patient's weight and dietary intake as ordered or per policy. Utilize nutrition screening tool and intervene as necessary. Determine patient's food preferences and provide high-protein, high-caloric foods as appropriate.     INTERVENTIONS:  - Monitor oral intake, urinary output, labs, and treatment plans  - Assess nutrition and hydration status and recommend course of action  - Evaluate amount of meals eaten  - Assist patient  with eating if necessary   - Allow adequate time for meals  - Recommend/ encourage appropriate diets, oral nutritional supplements, and vitamin/mineral supplements  - Order, calculate, and assess calorie counts as needed  - Recommend, monitor, and adjust tube feedings and TPN/PPN based on assessed needs  - Assess need for intravenous fluids  - Provide specific nutrition/hydration education as appropriate  - Include patient/family/caregiver in decisions related to nutrition  Outcome: Progressing

## 2025-05-26 NOTE — NURSING NOTE
"Patient is refusing miralax for not want to make a mess. Per patient \" I only use it at home if I need it\".  "

## 2025-05-27 PROCEDURE — 99232 SBSQ HOSP IP/OBS MODERATE 35: CPT | Performed by: STUDENT IN AN ORGANIZED HEALTH CARE EDUCATION/TRAINING PROGRAM

## 2025-05-27 RX ORDER — OLANZAPINE 5 MG/1
5 TABLET, FILM COATED ORAL
Status: DISCONTINUED | OUTPATIENT
Start: 2025-05-27 | End: 2025-05-28

## 2025-05-27 RX ADMIN — FLUTICASONE FUROATE AND VILANTEROL TRIFENATATE 1 PUFF: 100; 25 POWDER RESPIRATORY (INHALATION) at 08:04

## 2025-05-27 RX ADMIN — DIVALPROEX SODIUM 250 MG: 125 CAPSULE, COATED PELLETS ORAL at 16:55

## 2025-05-27 RX ADMIN — DIVALPROEX SODIUM 250 MG: 125 CAPSULE, COATED PELLETS ORAL at 21:55

## 2025-05-27 RX ADMIN — DIVALPROEX SODIUM 250 MG: 125 CAPSULE, COATED PELLETS ORAL at 08:04

## 2025-05-27 NOTE — CASE MANAGEMENT
" place call to Kenya (sister) tel#[phone] per patient's request. Kenya stated that her family has always been \"skinny\" and her father was 5'7\" and 103 pounds at 50. Kenya reported that patient's  is controlling and controls what food goes into the house and the food is not nutritious. Kenya also reported that the  and daughter were having patient drink about 6 quarts of water a day which caused sodium level to drop. Patient was getting confused with medications as their daughter would put her medications on the counter and patient would get confused as to who's medication she was taking. Kenya stated that she speaks with the patient at least 4-5x a week. Kenya reported that patient was perfectly fine until patient had low sodium, Kenya did not have any concerns regarding patient's mental health as patient was stable. Kenya reported that she would be unable to house patient due to herself being disabled and  having a heart condition. There was a visiting nurse in the home that came out once that their daughter told them not to come back due to having thoughts they were stealing from her. Patient sleeps on a couch at home due to patient not being able to walk up the stairs and the sister has mentioned to them to put a bed on the first floor though they do not do that. JANINE and Kenay scheduled a visit for tomorrow at 1pm.  "

## 2025-05-27 NOTE — CASE MANAGEMENT
CM informed patient of 303 hearing for today at 11:45am.    CM received call from [facility] informing CM that hearing may be moved up as they are ready.    CM and patient attended 303 hearing with [facility] where patient discussed not wanting to stay in the hospital with , hearing held and [facility] will send out determination in a few hours.

## 2025-05-27 NOTE — TREATMENT TEAM
05/27/25 [id number]   Activity/Group Checklist   Group Other (Comment)  (Mindfulness meditation and gratitude)   Attendance Attended   Attendance Duration (min) 46-60   Interactions Other (Comment)  (needed prompting)   Affect/Mood Blunted/flat;Incongruent   Goals Achieved Identified feelings;Identified triggers;Discussed coping strategies;Able to listen to others;Able to engage in interactions

## 2025-05-27 NOTE — PLAN OF CARE
Problem: GENITOURINARY - ADULT  Goal: Urinary catheter remains patent  Description: INTERVENTIONS:  - Assess patency of urinary catheter  - If patient has a chronic angelo, consider changing catheter if non-functioning  - Follow guidelines for intermittent irrigation of non-functioning urinary catheter  Outcome: Progressing     Problem: Ineffective Coping  Goal: Participates in unit activities  Description: Interventions:  - Provide therapeutic environment   - Provide required programming   - Redirect inappropriate behaviors   Outcome: Progressing     Problem: SAFETY ADULT  Goal: Patient will remain free of falls  Description: INTERVENTIONS:  - Educate patient/family on patient safety including physical limitations  - Instruct patient to call for assistance with activity   - Consider consulting OT/PT to assist with strengthening/mobility based on AM PAC & JH-HLM score  - Consult OT/PT to assist with strengthening/mobility   - Keep Call bell within reach  - Keep bed low and locked with side rails adjusted as appropriate  - Keep care items and personal belongings within reach  - Initiate and maintain comfort rounds  - Make Fall Risk Sign visible to staff  - Apply yellow socks and bracelet for high fall risk patients  - Consider moving patient to room near nurses station  Outcome: Progressing  Goal: Maintain or return to baseline ADL function  Description: INTERVENTIONS:  -  Assess patient's ability to carry out ADLs; assess patient's baseline for ADL function and identify physical deficits which impact ability to perform ADLs (bathing, care of mouth/teeth, toileting, grooming, dressing, etc.)  - Assess/evaluate cause of self-care deficits   - Assess range of motion  - Assess patient's mobility; develop plan if impaired  - Assess patient's need for assistive devices and provide as appropriate  - Encourage maximum independence but intervene and supervise when necessary  - Involve family in performance of ADLs  - Assess  for home care needs following discharge   - Consider OT consult to assist with ADL evaluation and planning for discharge  - Provide patient education as appropriate  - Monitor functional capacity and physical performance, use of AM PAC & JH-HLM   - Monitor gait, balance and fatigue with ambulation    Outcome: Progressing  Goal: Maintains/Returns to pre admission functional level  Description: INTERVENTIONS:  - Perform AM-PAC 6 Click Basic Mobility/ Daily Activity assessment daily.  - Set and communicate daily mobility goal to care team and patient/family/caregiver.   - Collaborate with rehabilitation services on mobility goals if consulted  - Out of bed for toileting  - Record patient progress and toleration of activity level   Outcome: Progressing

## 2025-05-27 NOTE — TREATMENT TEAM
05/27/25 0700   Team Meeting   Meeting Type Daily Rounds   Team Members Present   Team Members Present Physician;Nurse;;   Physician Team Member Dr. Muñiz / Dr. Lopez / Dr. Shau / LISA Bernard   Nursing Team Member Munir/Roger   Care Management Team Member Terry   Social Work Team Member Samuel   Patient/Family Present   Patient Present No   Patient's Family Present No     Patient to have 303 hearing today at 11:45am with [facility]. Patient reported to have refused HS medication yesterday, visible, complains of difficulty sleeping, patient on fall and seizure precaution, illestomy and angelo. Patient medication to be switched to Zyprexa, possibly medication over objection. Patient discharge is pending stabilization of mood and medications.

## 2025-05-27 NOTE — TREATMENT TEAM
05/27/25 0905   Provider Notification   Reason for Communication Evaluate   Provider Name SUZY Briseno   Provider Role Hospitalist   Method of Communication Other (Comment)  (Secure chat)   Response No new orders   Notification Time 0905   Shift Event Other (Comment)  (refused folic acid, thiamine)     Patient refused folic acid and thiamine. No new orders.
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
## 2025-05-27 NOTE — NURSING NOTE
Patient visible in the milieu. She refused schedule folic acid and thiamine. Patient can be irritable when task are given or during med pass, tries to give push back. Patient currently denies SI/HI and all other psych s/s. Safety checks are ongoing.

## 2025-05-27 NOTE — PHYSICAL THERAPY NOTE
Physical Therapy Evaluation    Patient's Name: Berta Smart    Admitting Diagnosis  Major depressive disorder, single episode, unspecified [F32.9]    Problem List  Problem List[1]    Past Medical History  Past Medical History[2]    Past Surgical History  Past Surgical History[3]    Recent Imaging  No orders to display       Recent Vital Signs  Vitals:    05/26/25 0741 05/26/25 1537 05/26/25 2031 05/26/25 2039   BP: 104/59 121/65 136/71 127/71   BP Location: Left arm Left arm Left arm Left arm   Pulse: 69 74 91 90   Resp: 17 17 18 17   Temp: (!) 96.3 °F (35.7 °C) 98 °F (36.7 °C) 97.7 °F (36.5 °C) 97.8 °F (36.6 °C)   TempSrc: Temporal Temporal Temporal Temporal   SpO2: 96% 98% 97% 95%   Weight:       Height:            05/23/25 1400   PT Last Visit   PT Visit Date 05/23/25   Note Type   Note type Evaluation   Pain Assessment   Pain Assessment Tool 0-10   Pain Score No Pain   Restrictions/Precautions   Weight Bearing Precautions Per Order No   Prior Function   Level of [place] Independent with ADLs;Independent with functional mobility   General   Family/Caregiver Present No   Cognition   Arousal/Participation Alert   Orientation Level Disoriented to place;Disoriented to time;Disoriented to person   RLE Assessment   RLE Assessment   (4/5)   LLE Assessment   LLE Assessment   (4/5)   Coordination   Movements are Fluid and Coordinated 0   Coordination and Movement Description mild gross motor impairemen   Sensation X   Light Touch   RLE Light Touch Impaired   LLE Light Touch Impaired   Transfers   Sit to Stand 6  Modified independent   Additional items Increased time required   Stand to Sit 6  Modified independent   Additional items Increased time required   Ambulation/Elevation   Gait pattern Step through pattern;Decreased toe off;Decreased heel strike;Excessively slow;Short stride;Decreased foot clearance;Improper Weight shift   Gait Assistance 6  Modified independent   Additional items Verbal cues   Assistive  Device   (rollator)   Distance 150ft   Balance   Static Sitting Fair +   Dynamic Sitting Fair +   Static Standing Fair   Dynamic Standing Fair -   Ambulatory Fair -   Endurance Deficit   Endurance Deficit Yes   Endurance Deficit Description decreased vs baseline   Activity Tolerance   Activity Tolerance Patient limited by fatigue   Medical Staff Made Aware spoke to CM   Nurse Made Aware spoke to RN   Assessment   Prognosis Good   Problem List Decreased strength;Decreased endurance;Impaired balance;Decreased mobility;Decreased coordination;Impaired sensation   Barriers to Discharge Inaccessible home environment;Decreased caregiver support   Goals   Patient Goals to go back to bed   Discharge Recommendation   Rehab Resource Intensity Level, PT No post-acute rehabilitation needs   Equipment Recommended Walker   Walker Package Recommended Wheeled walker   AM-PAC Basic Mobility Inpatient   Turning in Flat Bed Without Bedrails 4   Lying on Back to Sitting on Edge of Flat Bed Without Bedrails 4   Moving Bed to Chair 4   Standing Up From Chair Using Arms 4   Walk in Room 4   Climb 3-5 Stairs With Railing 3   Basic Mobility Inpatient Raw Score 23   Basic Mobility Standardized Score 50.88   [facility] Highest Level Of Mobility   -HL Goal 7: Walk 25 feet or more   -HLM Achieved 7: Walk 25 feet or more   End of Consult   Patient Position at End of Consult All needs within reach;Seated edge of bed       ASSESSMENT                                                                                                                     Berta Smart is a 59 y.o. female admitted to [facility] on 5/22/2025 for Bipolar disorder, current episode manic, severe (HCC). Pt  has a past medical history of Alcoholism ([facility]), Depression, Hypothyroidism, and PTSD (post-traumatic stress disorder).. PT was consulted and pt was seen on 5/27/2025 for mobility assessment and d/c planning.  Impairments limiting pt at this time include impaired balance,  decreased endurance, decreased coordination, decreased safety awareness, impaired judgement, decreased sensation, and decreased strength. Pt is currently functioning at a modified independent assistance level for bed mobility, modified independent assistance level for transfers, modified independent assistance level for ambulation with Rolling Walker. The patient's AM-PAC Basic Mobility Inpatient Short Form Raw Score is 23. A Raw score of greater than 16 suggests the patient may benefit from discharge to home. Please also refer to the recommendation of the Physical Therapist for safe discharge planning.    Recommendations                                                                                                                DME: Rolling Walker    Discharge Disposition:  Home with no needs      Quang Macdonald PT, DPT         [1]   Patient Active Problem List  Diagnosis    Colostomy status (HCC)    KRAIG (generalized anxiety disorder)    Current mild episode of major depressive disorder without prior episode (HCC)    Tobacco use disorder    BMI less than 19,adult    SOB (shortness of breath)    Abnormal CXR    Colostomy in place (HCC)    Encephalopathy    Urinary retention    Severe protein-calorie malnutrition (HCC)    Adjustment disorder with mixed disturbance of emotions and conduct    Dysuria    At high risk for electrolyte imbalance    Bipolar disorder, current episode manic, severe (HCC)    Alcohol withdrawal delirium (HCC)    Bed sore    Seizure (HCC)    PTSD (post-traumatic stress disorder)    History of alcohol use disorder    Medical clearance for psychiatric admission    Hyponatremia   [2]   Past Medical History:  Diagnosis Date    Alcoholism (HCC)     Depression     Hypothyroidism     PTSD (post-traumatic stress disorder)    [3]   Past Surgical History:  Procedure Laterality Date    COLOSTOMY      CYSTOSCOPY W/ URETERAL STENT PLACEMENT Left     EXPLORATORY LAPAROTOMY      SALPINGECTOMY Bilateral  02/2025

## 2025-05-28 PROCEDURE — 99232 SBSQ HOSP IP/OBS MODERATE 35: CPT | Performed by: PSYCHIATRY & NEUROLOGY

## 2025-05-28 RX ORDER — OLANZAPINE 10 MG/2ML
5 INJECTION, POWDER, FOR SOLUTION INTRAMUSCULAR
Status: DISCONTINUED | OUTPATIENT
Start: 2025-05-28 | End: 2025-06-03 | Stop reason: HOSPADM

## 2025-05-28 RX ORDER — OLANZAPINE 5 MG/1
5 TABLET, FILM COATED ORAL
Status: DISCONTINUED | OUTPATIENT
Start: 2025-05-28 | End: 2025-06-03 | Stop reason: HOSPADM

## 2025-05-28 RX ORDER — ONDANSETRON 4 MG/1
4 TABLET, ORALLY DISINTEGRATING ORAL EVERY 8 HOURS PRN
Status: DISCONTINUED | OUTPATIENT
Start: 2025-05-28 | End: 2025-06-03 | Stop reason: HOSPADM

## 2025-05-28 RX ADMIN — DIVALPROEX SODIUM 250 MG: 125 CAPSULE, COATED PELLETS ORAL at 08:10

## 2025-05-28 RX ADMIN — ONDANSETRON 4 MG: 4 TABLET, ORALLY DISINTEGRATING ORAL at 18:22

## 2025-05-28 RX ADMIN — THIAMINE HCL TAB 100 MG 100 MG: 100 TAB at 08:10

## 2025-05-28 RX ADMIN — OLANZAPINE 5 MG: 5 TABLET, FILM COATED ORAL at 21:29

## 2025-05-28 RX ADMIN — FOLIC ACID 1 MG: 1 TABLET ORAL at 08:10

## 2025-05-28 RX ADMIN — DIVALPROEX SODIUM 250 MG: 125 CAPSULE, COATED PELLETS ORAL at 15:47

## 2025-05-28 RX ADMIN — TAMSULOSIN HYDROCHLORIDE 0.4 MG: 0.4 CAPSULE ORAL at 15:47

## 2025-05-28 RX ADMIN — FLUTICASONE FUROATE AND VILANTEROL TRIFENATATE 1 PUFF: 100; 25 POWDER RESPIRATORY (INHALATION) at 08:10

## 2025-05-28 RX ADMIN — DIVALPROEX SODIUM 250 MG: 125 CAPSULE, COATED PELLETS ORAL at 21:29
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
## 2025-05-28 NOTE — NURSING NOTE
Patient requested snack during the night that her sugar is low but she is not diabetic then she went on to say they have not put that in her chart yet. Patient was told the unit policy regarding snack. Later on patient was requesting for her inhaler that she cannot breath which is scheduled for 0900 and that her BP is high, when this writer asked how does she know that her BP is elevated patient then stated if her diastolic bp is higher than 60 then her bp is high. Spo2 checked was 96% and bp 114/65. Patient was asked if she is anxious and she said yes and this writer offered to give her atarax and patient was agreeable but when this writer brought the medication patient declined the medication.
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
## 2025-05-28 NOTE — QUICK NOTE
"SECOND OPINION FOR INJECTABLE PSYCHIATRIC MEDICATIONS    Berta Smart 59 y.o. female MRN: [id number]  Unit/Bed#: [facility] 649-01 Encounter: [id number]      Reason for Admission/Current Symptoms:    Berta Smart is a 59 y.o. female with a history of Bipolar Disorder, Generalized Anxiety Disorder, PTSD, and alcohol use who was admitted to the inpatient psychiatric unit on a involuntary 303 commitment basis due to non-compliance with medication and failure to care for self.. Second opinion for injectable psychotropic medications was requested by Dr. Muñiz due to Berta's refusal to take oral medications.    On evaluation today Berta reports that she has appealed her 303 involuntary commitment and is expecting a call from her . Berta demonstrates poor insight and tells physician that she was involuntarily hospitalized due to having \" a low salt level.\" She also states \"I'm here because a committed mental health patient was trying to control my medications.\" She does not acknowledge having any mental health needs and perseverates about receiving melatonin.     Mental Status Evaluation:    Appearance:  casually dressed, looks stated age, underweight, thin & gaunt looking   Behavior:  guarded   Speech:  normal rate and volume   Mood:  irritable   Affect:  constricted, increased in intensity   Thought Process:  circumstantial, perseverative   Associations: circumstantial associations   Thought Content:  some paranoia, ruminations   Perceptual Disturbances: no auditory hallucinations, no visual hallucinations, does not appear responding to internal stimuli   Risk Potential: Suicidal ideation - None at present  Homicidal ideation - None at present  Potential for aggression - No   Sensorium:  oriented to person, place, and time/date   Memory:  recent and remote memory grossly intact   Consciousness:  alert and awake    Attention: attention span and concentration appear shorter than expected for age   Insight:  poor "   Judgment: impaired   Gait/Station: uses walker   Motor Activity: no abnormal movements       Vital signs in last 24 hours:    Temp:  [97.1 °F (36.2 °C)-99.1 °F (37.3 °C)] 97.1 °F (36.2 °C)  HR:  [70-87] 72  BP: (106-119)/(59-69) 106/59  Resp:  [16] 16  SpO2:  [91 %-97 %] 91 %  O2 Device: None (Room air)    Labs: I have personally reviewed all pertinent laboratory/tests results.  Most Recent Labs:   Lab Results   Component Value Date    WBC 7.57 05/23/2025    RBC 4.30 05/23/2025    HGB 13.3 05/23/2025    HCT 40.1 05/23/2025     05/23/2025    RDW 13.2 05/23/2025    TOTANEUTABS 2.77 05/18/2025    NEUTROABS 3.29 05/23/2025    SODIUM 135 05/26/2025    K 4.0 05/26/2025     05/26/2025    CO2 27 05/26/2025    BUN 17 05/26/2025    CREATININE 0.43 (L) 05/26/2025    GLUC 83 05/26/2025    GLUF 83 05/26/2025    CALCIUM 8.7 05/26/2025    AST 13 05/26/2025    ALT 9 05/26/2025    ALKPHOS 51 05/26/2025    TP 6.2 (L) 05/26/2025    ALB 3.4 (L) 05/26/2025    TBILI 0.43 05/26/2025    VALPROICTOT 77 05/26/2025    AMMONIA 38 04/04/2025    WIL9TMQXNOSZ 9.729 (H) 05/23/2025    FREET4 0.87 05/23/2025    HGBA1C 5.3 01/22/2015     01/22/2015       Most Recent Labs:   Lab Results   Component Value Date    WBC 7.57 05/23/2025    RBC 4.30 05/23/2025    HGB 13.3 05/23/2025    HCT 40.1 05/23/2025     05/23/2025    RDW 13.2 05/23/2025    NEUTROABS 3.29 05/23/2025    TOTANEUTABS 2.77 05/18/2025    SODIUM 135 05/26/2025    K 4.0 05/26/2025     05/26/2025    CO2 27 05/26/2025    BUN 17 05/26/2025    CREATININE 0.43 (L) 05/26/2025    GLUC 83 05/26/2025    CALCIUM 8.7 05/26/2025    AST 13 05/26/2025    ALT 9 05/26/2025    ALKPHOS 51 05/26/2025    TP 6.2 (L) 05/26/2025    ALB 3.4 (L) 05/26/2025    TBILI 0.43 05/26/2025    VALPROICTOT 77 05/26/2025    AMMONIA 38 04/04/2025    [id number] 9.729 (H) 05/23/2025    FREET4 0.87 05/23/2025    HGBA1C 5.3 01/22/2015     01/22/2015       Medications:  All current active  medications have been reviewed.    Current medications:  Current Facility-Administered Medications   Medication Dose Route Frequency Provider Last Rate    acetaminophen  650 mg Oral Q4H PRN Carla Bernard, CRNP      acetaminophen  650 mg Oral Q4H PRN Carla Bernard, CRNP      acetaminophen  975 mg Oral Q6H PRN Carla Bernard, CRNP      aluminum-magnesium hydroxide-simethicone  30 mL Oral Q6H PRN Carla Bernard, CRNP      bisacodyl  10 mg Rectal Daily PRN Mahendra Muñiz MD      divalproex sodium  250 mg Oral TID Mahendra Muñiz MD      ergocalciferol  50,000 Units Oral Weekly Mariposa Escamilla, CRNP      Fluticasone Furoate-Vilanterol  1 puff Inhalation Daily Carla Bernard, CRNP      folic acid  1 mg Oral Daily Carla Bernard, CRNP      hydrOXYzine HCL  25 mg Oral Q6H PRN Max 4/day Carla Bernard, CRNP      hydrOXYzine HCL  50 mg Oral Q6H PRN Max 4/day Carla Bernard, CRNP      LORazepam  1 mg Intramuscular Q6H PRN Max 3/day Carla Bernard, CRNP      LORazepam  1 mg Oral Q6H PRN Max 3/day Carla Bernard, CRNP      melatonin  3 mg Oral HS Carla Bernard, CRNP      nicotine polacrilex  2 mg Oral Q2H PRN Mahendra Muñiz MD      OLANZapine  5 mg Intramuscular Q3H PRN Max 3/day Carla Bernard, CRNP      OLANZapine  5 mg Oral HS Mahendra Muñiz MD      Or    OLANZapine  5 mg Intramuscular HS Mahendra Muñiz MD      OLANZapine  2.5 mg Oral Q4H PRN Max 6/day Carla Bernard, CRNP      OLANZapine  5 mg Oral Q4H PRN Max 3/day Carla Bernard, CRNP      OLANZapine  5 mg Oral Q3H PRN Max 3/day Carla Bernard, CRNP      polyethylene glycol  17 g Oral Daily PRN Carla Bernard, CRNP      senna-docusate sodium  1 tablet Oral Daily PRN Carla Bernard, CRNP      tamsulosin  0.4 mg Oral Daily With Dinner Carla Bernard, CRNP      thiamine  100 mg Oral Daily Mariposa Escamilla, CRNP      traZODone  50 mg Oral HS PRN Carla Bernard, CRNP         Assessment & Plan     Principal Problem:    Bipolar disorder, current  episode manic, severe (HCC)  Active Problems:    KRAIG (generalized anxiety disorder)    Tobacco use disorder    Colostomy in place (HCC)    Urinary retention    Severe protein-calorie malnutrition (HCC)    At high risk for electrolyte imbalance    Seizure (HCC)    PTSD (post-traumatic stress disorder)    History of alcohol use disorder    Medical clearance for psychiatric admission    Hyponatremia      Recommended Treatment:     I agree with the need for injectable antipsychotic medications if Berta refuses oral medications.  Without treatment Berta poses a danger to self due to inability to care for her basic needs.  Berta refuses treatment and does not have understanding of risks of treatment refusal or benefits of treatment at this time.  Berta is not likely to improve without medications and will require administration of injectable medications against her will to assure safety of self and others    Priscila Sahu, DO 05/28/25

## 2025-05-28 NOTE — PROGRESS NOTES
05/28/25 1100   Activity/Group Checklist   Group Wellness   Attendance Attended   Attendance Duration (min) 31-45   Interactions Interacted appropriately   Affect/Mood Wide  (labile)   Goals Achieved Identified feelings;Identified triggers;Discussed coping strategies;Discussed discharge plans;Discussed self-esteem issues;Identified resources and support systems;Able to listen to others;Able to engage in interactions;Able to reflect/comment on own behavior;Able to self-disclose;Able to recieve feedback;Able to give feedback to another

## 2025-05-28 NOTE — CASE MANAGEMENT
CM place call to Jose Alberto (Daughter) tel#[phone] to gather collateral information. CM left message.

## 2025-05-28 NOTE — MALNUTRITION/BMI
This medical record reflects one or more clinical indicators suggestive of malnutrition and/or morbid obesity.    Malnutrition Findings:   Adult Malnutrition type: Chronic illness  Adult Degree of Malnutrition: Other severe protein calorie malnutrition  Malnutrition Characteristics: Fat loss, Muscle loss, Inadequate energy                360 Statement: Severe protein/calorie malnutrition r/t catabolic illness/inadequate intake as evidenced by BMI 13.7, consuming < 75% enrgy intake compared to estimated energy needs > 1 month, severe fat/muscle wasting observed at orbittal/temple/clavicle areas. Treated with oral diet and EnSure Plus HP tid    BMI Findings:  Adult BMI Classifications: Underweight < 18.5        Body mass index is 13.66 kg/m².     See Nutrition note dated 5/23/2025 for additional details.  Completed nutrition assessment is viewable in the nutrition documentation.
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
## 2025-05-28 NOTE — PLAN OF CARE
Problem: DISCHARGE PLANNING - CARE MANAGEMENT  Goal: Discharge to post-acute care or home with appropriate resources  Description: INTERVENTIONS:  - Conduct assessment to determine patient/family and health care team treatment goals, and need for post-acute services based on payer coverage, community resources, and patient preferences, and barriers to discharge  - Address psychosocial, clinical, and financial barriers to discharge as identified in assessment in conjunction with the patient/family and health care team  - Arrange appropriate level of post-acute services according to patient’s   needs and preference and payer coverage in collaboration with the physician and health care team  - Communicate with and update the patient/family, physician, and health care team regarding progress on the discharge plan  - Arrange appropriate transportation to post-acute venues  Outcome: Not Progressing  Discharge planning not progressing as patient needs for disposition unclear. CM left message for family to return call to gather more information regarding goals of care and disposition planning.

## 2025-05-28 NOTE — NURSING NOTE
Patient is visible on the unit and social with peers. Patient denies all psych S/S. Patient is irritable, labile, and intrusive. Patient told this writer that she did not know the time or what day it was. Patient is not compliant with medications; she refused Melatonin and Zyprexa HS. Patient ate 100% of all meals. Patient attended 1 group. Patient ileostomy and angelo catheter both intact and draining.
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
## 2025-05-28 NOTE — PLAN OF CARE
Pt attends group and visible.   Problem: Ineffective Coping  Goal: Participates in unit activities  Description: Interventions:  - Provide therapeutic environment   - Provide required programming   - Redirect inappropriate behaviors   Outcome: Progressing

## 2025-05-28 NOTE — PLAN OF CARE
Problem: Depression  Goal: Treatment Goal: Demonstrate behavioral control of depressive symptoms, verbalize feelings of improved mood/affect, and adopt new coping skills prior to discharge  Outcome: Progressing     Problem: Anxiety  Goal: Anxiety is at manageable level  Description: Interventions:  - Assess and monitor patient's anxiety level.   - Monitor for signs and symptoms (heart palpitations, chest pain, shortness of breath, headaches, nausea, feeling jumpy, restlessness, irritable, apprehensive).   - Collaborate with interdisciplinary team and initiate plan and interventions as ordered.  - [place] patient to unit/surroundings  - Explain treatment plan  - Encourage participation in care  - Encourage verbalization of concerns/fears  - Identify coping mechanisms  - Assist in developing anxiety-reducing skills  - Administer/offer alternative therapies  - Limit or eliminate stimulants  Outcome: Progressing     Problem: SAFETY ADULT  Goal: Patient will remain free of falls  Description: INTERVENTIONS:  - Educate patient/family on patient safety including physical limitations  - Instruct patient to call for assistance with activity   - Consider consulting OT/PT to assist with strengthening/mobility based on AM PAC & JH-HLM score  - Consult OT/PT to assist with strengthening/mobility   - Keep Call bell within reach  - Keep bed low and locked with side rails adjusted as appropriate  - Keep care items and personal belongings within reach  - Initiate and maintain comfort rounds  - Make Fall Risk Sign visible to staff  - Apply yellow socks and bracelet for high fall risk patients  - Consider moving patient to room near nurses station  Outcome: Progressing

## 2025-05-28 NOTE — TREATMENT TEAM
05/28/25 0800   Team Meeting   Meeting Type Daily Rounds   Team Members Present   Team Members Present Physician;Nurse;;   Physician Team Member Dr. Muñiz / Dr. Lopez / Dr. Sahu / LISA Bernard   Nursing Team Member Munir/Roger   Care Management Team Member Terry   Social Work Team Member Samuel   Patient/Family Present   Patient Present No   Patient's Family Present No     Patient reported to be irritable, somatic, refused medications yesterday, reported to be short of breath though vitals were fine, patient had interrupted sleep last night. Patient medication to switch to zyprexa. Patient to be evaluated for medications over objection. Patient delusional regarding dietician/nutritionist. Patient discharge is pending stabilization of mood and medications.

## 2025-05-28 NOTE — NURSING NOTE
"Patient withdrawn to her room and upset after speaking with provider. She does not want to stay another 20 days here. Per patient \" I should be able to refuse meds that is my right\". Patient refused thiamine and folic acid. Dietrich has been removed and voiding trial has been initiated. She endorse depression because she is  here and denies all other psych s/s. Safety checks are ongoing.  "

## 2025-05-29 ENCOUNTER — TELEPHONE (OUTPATIENT)
Age: 60
End: 2025-05-29

## 2025-05-29 PROCEDURE — 97167 OT EVAL HIGH COMPLEX 60 MIN: CPT

## 2025-05-29 PROCEDURE — 99232 SBSQ HOSP IP/OBS MODERATE 35: CPT | Performed by: STUDENT IN AN ORGANIZED HEALTH CARE EDUCATION/TRAINING PROGRAM

## 2025-05-29 RX ADMIN — DIVALPROEX SODIUM 250 MG: 125 CAPSULE, COATED PELLETS ORAL at 08:07

## 2025-05-29 RX ADMIN — DIVALPROEX SODIUM 250 MG: 125 CAPSULE, COATED PELLETS ORAL at 15:52

## 2025-05-29 RX ADMIN — DIVALPROEX SODIUM 250 MG: 125 CAPSULE, COATED PELLETS ORAL at 21:30

## 2025-05-29 RX ADMIN — FLUTICASONE FUROATE AND VILANTEROL TRIFENATATE 1 PUFF: 100; 25 POWDER RESPIRATORY (INHALATION) at 08:07

## 2025-05-29 RX ADMIN — NICOTINE POLACRILEX 2 MG: 2 GUM, CHEWING BUCCAL at 14:44

## 2025-05-29 RX ADMIN — TAMSULOSIN HYDROCHLORIDE 0.4 MG: 0.4 CAPSULE ORAL at 15:52

## 2025-05-29 RX ADMIN — OLANZAPINE 5 MG: 5 TABLET, FILM COATED ORAL at 21:29

## 2025-05-29 NOTE — CASE MANAGEMENT
JANINE place call to Kenya (sister) tel#[phone] to gather more information.   Reported that the visit went well, though patient seemed confused regarding medication that patient was being given though Kenya was able to clarify and convince patient to take medications. Kenya reported that the concern that she is that patient might get confused on taking her medication as her daughter was assisting her with it previously, though does not trust her daughter to correctly give her medication. Kenya reported that she spoke with patient yesterday regarding a visiting nurse to assist with medication management, and patient was in agreement with it. CM explained process for VNS services. Kenya explained that she also discussed with patient moving her room downstairs so the only reason to go up the stair would be for a shower, patient reported that she'd think it over as she is currently sleeping on the couch downstairs. Kenya stated that she would be able to discuss with her  to pick patient up upon discharge.

## 2025-05-29 NOTE — PROGRESS NOTES
"Pt attended group and able to self reflect.  Pt did note she is here \"involuntarily\" and questions the system.  Pt stated \"fake it until you make it\" with her outlook.  Lacks insight..     05/29/25 1000   Activity/Group Checklist   Group Other (Comment)  ( Recovery: \"Don't quit\" poem)   Attendance Attended   Attendance Duration (min) 31-45   Interactions Other (Comment)  (less confrontational)   Affect/Mood Incongruent   Goals Achieved Identified feelings;Identified triggers;Identified relapse prevention strategies;Discussed coping strategies;Discussed self-esteem issues;Able to manage/cope with feelings;Able to reflect/comment on own behavior;Able to engage in interactions;Able to listen to others           "

## 2025-05-29 NOTE — PLAN OF CARE
Problem: GASTROINTESTINAL - ADULT  Goal: Establish and maintain optimal ostomy function  Description: INTERVENTIONS:  - Assess bowel function  - Encourage oral fluids to ensure adequate hydration  - Administer IV fluids if ordered to ensure adequate hydration   - Administer ordered medications as needed  - Encourage mobilization and activity  - Nutrition services referral to assist patient with appropriate food choices  - Assess stoma site  - Consider wound care consult   Outcome: Progressing     Problem: SKIN/TISSUE INTEGRITY - ADULT  Goal: Skin Integrity remains intact(Skin Breakdown Prevention)  Description: Assess:  -Perform William assessment every shift  -Clean and moisturize skin every day  -Inspect skin when repositioning, toileting, and assisting with ADLS  -Assess under medical devices such as ostomy every shift  -Assess extremities for adequate circulation and sensation     Bed Management:  -Have minimal linens on bed & keep smooth, unwrinkled  -Change linens as needed when moist or perspiring      Activity:  -Mobilize patient 3 times a day  -Encourage activity and walks on unit    -Use appropriate equipment to lift or move patient in bed          Outcome: Progressing     Problem: Alteration in Thoughts and Perception  Goal: Treatment Goal: Gain control of psychotic behaviors/thinking, reduce/eliminate presenting symptoms and demonstrate improved reality functioning upon discharge  Outcome: Progressing     Problem: Ineffective Coping  Goal: Participates in unit activities  Description: Interventions:  - Provide therapeutic environment   - Provide required programming   - Redirect inappropriate behaviors   Outcome: Progressing     Problem: Depression  Goal: Treatment Goal: Demonstrate behavioral control of depressive symptoms, verbalize feelings of improved mood/affect, and adopt new coping skills prior to discharge  Outcome: Progressing     Problem: Anxiety  Goal: Anxiety is at manageable  level  Description: Interventions:  - Assess and monitor patient's anxiety level.   - Monitor for signs and symptoms (heart palpitations, chest pain, shortness of breath, headaches, nausea, feeling jumpy, restlessness, irritable, apprehensive).   - Collaborate with interdisciplinary team and initiate plan and interventions as ordered.  - [place] patient to unit/surroundings  - Explain treatment plan  - Encourage participation in care  - Encourage verbalization of concerns/fears  - Identify coping mechanisms  - Assist in developing anxiety-reducing skills  - Administer/offer alternative therapies  - Limit or eliminate stimulants  Outcome: Progressing     Problem: Alteration in Orientation  Goal: Treatment Goal: Demonstrate a reduction of confusion and improved orientation to person, place, time and/or situation upon discharge, according to optimum baseline/ability  Outcome: Progressing     Problem: Alteration in Thoughts and Perception  Goal: Attend and participate in unit activities, including therapeutic, recreational, and educational groups  Description: Interventions:  -Encourage Visitation and family involvement in care  Outcome: Progressing     Problem: DISCHARGE PLANNING  Goal: Discharge to home or other facility with appropriate resources  Description: INTERVENTIONS:  - Identify barriers to discharge w/patient and caregiver  - Arrange for needed discharge resources and transportation as appropriate  - Identify discharge learning needs (meds, wound care, etc.)  - Arrange for interpretive services to assist at discharge as needed  - Refer to Case Management Department for coordinating discharge planning if the patient needs post-hospital services based on physician/advanced practitioner order or complex needs related to functional status, cognitive ability, or social support system  Outcome: Progressing     Problem: DISCHARGE PLANNING - CARE MANAGEMENT  Goal: Discharge to post-acute care or home with appropriate  resources  Description: INTERVENTIONS:  - Conduct assessment to determine patient/family and health care team treatment goals, and need for post-acute services based on payer coverage, community resources, and patient preferences, and barriers to discharge  - Address psychosocial, clinical, and financial barriers to discharge as identified in assessment in conjunction with the patient/family and health care team  - Arrange appropriate level of post-acute services according to patient’s   needs and preference and payer coverage in collaboration with the physician and health care team  - Communicate with and update the patient/family, physician, and health care team regarding progress on the discharge plan  - Arrange appropriate transportation to post-acute venues  Outcome: Progressing

## 2025-05-29 NOTE — TELEPHONE ENCOUNTER
Pt is scheduled for [facility] 6/17/25 at 9am with Fern Merino for med management at [facility]. [facility] sent to JANINE Bryan with the information.

## 2025-05-29 NOTE — CASE MANAGEMENT
CM met with patient to have XOCHITL [facility]. Patient in agreement with [facility]NA in the home to assist with medication and colostomy bag.    Ambulatory referral made for medication management through [facility] psych

## 2025-05-29 NOTE — ASSESSMENT & PLAN NOTE
Malnutrition Findings:   Adult Malnutrition type: Chronic illness  Adult Degree of Malnutrition: Other severe protein calorie malnutrition   Body mass index is 14.19 kg/m².   - nutritional support as indicated

## 2025-05-29 NOTE — NURSING NOTE
Berta is withdrawn to her room. Pt refused Melatonin but complied with other medications. She endorses depression secondary to being here. She denies all other psych s/s. Safety measures maintained.

## 2025-05-29 NOTE — WOUND OSTOMY CARE
Progress Note - Ostomy  Berta Smart 59 y.o. female MRN: [id number]  Unit/Bed#: [facility] 649-01 Encounter: [id number]        Assessment:   Patient seen for wound/ostomy follow-up.  She is agreeable to assessment.  Independently ambulatory on the unit with a walker.  Continent of urine.  Nutrition team following, dietary supplements ordered.  Patient with BMI of 14.19--significant bony prominences.  Bilateral heels, buttocks, and sacrum intact without redness--preventative foam dressing applied to sacrum.  Mid spine intact with blanchable erythema--preventative foam dressing applied.      Colostomy stoma is pink, budded with mild prolapse, measuring 1 inch, round.  Small granulomas to lower edge of stoma.  No bleeding at this time, but patient reports they occasionally bleed.  Peristomal skin pink and intact.  Mucocutaneous junction intact.    Patient is completely independent with all steps of ostomy care if provided with supplies.  Patient states she normally changes her ostomy pouch about every 3 days.        Skin Care Plan:   1-Silicone cream/Hydraguard lotion to bilateral heels twice daily and as needed.  2-Elevate heels off of bed/chair surface to offload pressure.  3-Offloading air cushion in chair when out of bed.  4-Moisturize skin twice daily with skin nourishing cream.  5-Cleanse sacral/buttocks and back with soap and water. Apply Silicone bordered foam, lucy P for prevention, and change every 3 days and PRN soilage/displacement    Ostomy Care Plan:  Change ostomy pouch every 3 days and with signs of leakage (use gray 1 piece Coloplast Sensura Gibran flat back pouch--call [facility] for item #[id number]):  1. Peel back pouch using push-pull method  2. Use warm water only to cleanse skin around the stoma (cesar-stomal skin).  3. Make sure all adhesive residue is removed and skin is dry and not oily.  4. Measure stoma size using measuring guide and trace correct measurements onto back of pouch (1 inch round).  5. Then cut the  backing of pouch out to correct shape/size.  6. Place pouch over stoma and onto skin.  7. Use warmth of hand to apply gentle pressure to help backing of pouch to adhere well to skin.  8. Empty pouch when 1/3-1/2 full of gas/stool.    Wound care team will sign-off at this time.      Shayla OLIVER, RN, CWON

## 2025-05-29 NOTE — NURSING NOTE
Patient visible in the unit. She is cooperative and compliant with medications. She endorses depression and currently denies all other psych s/s. Safety checks are ongoing.

## 2025-05-29 NOTE — TREATMENT TEAM
05/29/25 0800   Team Meeting   Meeting Type Daily Rounds   Team Members Present   Team Members Present Physician;Nurse;;   Physician Team Member Dr. Muñiz / Dr. Lopez / Dr. Sahu / LISA Bernard   Nursing Team Member Munir/Roger   Care Management Team Member Terry   Social Work Team Member Samuel   Patient/Family Present   Patient Present No   Patient's Family Present No     Patient medication over objection for Zyprexa. Patient angelo is d/c, patient is attending groups, more visible yesterday, not so irritable. Patient discharge is pending stabilization of mood and medications.
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
## 2025-05-29 NOTE — NURSING NOTE
Patient was eating dinner and a tooth came out, unsure from where. Tooth put in container and container in with her belongings.
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
## 2025-05-30 LAB
ANION GAP SERPL CALCULATED.3IONS-SCNC: 7 MMOL/L (ref 4–13)
BUN SERPL-MCNC: 17 MG/DL (ref 5–25)
CALCIUM SERPL-MCNC: 8.8 MG/DL (ref 8.4–10.2)
CHLORIDE SERPL-SCNC: 98 MMOL/L (ref 96–108)
CO2 SERPL-SCNC: 30 MMOL/L (ref 21–32)
CREAT SERPL-MCNC: 0.34 MG/DL (ref 0.6–1.3)
GFR SERPL CREATININE-BSD FRML MDRD: 119 ML/MIN/1.73SQ M
GLUCOSE P FAST SERPL-MCNC: 78 MG/DL (ref 65–99)
GLUCOSE SERPL-MCNC: 78 MG/DL (ref 65–140)
MAGNESIUM SERPL-MCNC: 1.9 MG/DL (ref 1.9–2.7)
POTASSIUM SERPL-SCNC: 4.1 MMOL/L (ref 3.5–5.3)
SODIUM SERPL-SCNC: 135 MMOL/L (ref 135–147)
VALPROATE SERPL-MCNC: 72 UG/ML (ref 50–125)

## 2025-05-30 PROCEDURE — 83735 ASSAY OF MAGNESIUM: CPT | Performed by: NURSE PRACTITIONER

## 2025-05-30 PROCEDURE — 80048 BASIC METABOLIC PNL TOTAL CA: CPT | Performed by: NURSE PRACTITIONER

## 2025-05-30 PROCEDURE — 80164 ASSAY DIPROPYLACETIC ACD TOT: CPT | Performed by: STUDENT IN AN ORGANIZED HEALTH CARE EDUCATION/TRAINING PROGRAM

## 2025-05-30 PROCEDURE — 99232 SBSQ HOSP IP/OBS MODERATE 35: CPT | Performed by: STUDENT IN AN ORGANIZED HEALTH CARE EDUCATION/TRAINING PROGRAM

## 2025-05-30 RX ADMIN — DIVALPROEX SODIUM 250 MG: 125 CAPSULE, COATED PELLETS ORAL at 16:18

## 2025-05-30 RX ADMIN — DIVALPROEX SODIUM 250 MG: 125 CAPSULE, COATED PELLETS ORAL at 21:24

## 2025-05-30 RX ADMIN — OLANZAPINE 5 MG: 5 TABLET, FILM COATED ORAL at 21:24

## 2025-05-30 RX ADMIN — ERGOCALCIFEROL 50000 UNITS: 1.25 CAPSULE ORAL at 08:42

## 2025-05-30 RX ADMIN — NICOTINE POLACRILEX 2 MG: 2 GUM, CHEWING BUCCAL at 10:27

## 2025-05-30 RX ADMIN — TAMSULOSIN HYDROCHLORIDE 0.4 MG: 0.4 CAPSULE ORAL at 16:18

## 2025-05-30 RX ADMIN — FLUTICASONE FUROATE AND VILANTEROL TRIFENATATE 1 PUFF: 100; 25 POWDER RESPIRATORY (INHALATION) at 08:46

## 2025-05-30 RX ADMIN — DIVALPROEX SODIUM 250 MG: 125 CAPSULE, COATED PELLETS ORAL at 08:42

## 2025-05-30 RX ADMIN — Medication 3 MG: at 21:24

## 2025-05-30 NOTE — PLAN OF CARE
Problem: GASTROINTESTINAL - ADULT  Goal: Establish and maintain optimal ostomy function  Description: INTERVENTIONS:  - Assess bowel function  - Encourage oral fluids to ensure adequate hydration  - Administer IV fluids if ordered to ensure adequate hydration   - Administer ordered medications as needed  - Encourage mobilization and activity  - Nutrition services referral to assist patient with appropriate food choices  - Assess stoma site  - Consider wound care consult   Outcome: Progressing     Problem: SKIN/TISSUE INTEGRITY - ADULT  Goal: Skin Integrity remains intact(Skin Breakdown Prevention)  Description: Assess:  -Perform William assessment every shift  -Clean and moisturize skin every day  -Inspect skin when repositioning, toileting, and assisting with ADLS  -Assess under medical devices such as ostomy every shift  -Assess extremities for adequate circulation and sensation     Bed Management:  -Have minimal linens on bed & keep smooth, unwrinkled  -Change linens as needed when moist or perspiring      Activity:  -Mobilize patient 3 times a day  -Encourage activity and walks on unit    -Use appropriate equipment to lift or move patient in bed          Outcome: Progressing     Problem: Alteration in Thoughts and Perception  Goal: Treatment Goal: Gain control of psychotic behaviors/thinking, reduce/eliminate presenting symptoms and demonstrate improved reality functioning upon discharge  Outcome: Progressing     Problem: Depression  Goal: Treatment Goal: Demonstrate behavioral control of depressive symptoms, verbalize feelings of improved mood/affect, and adopt new coping skills prior to discharge  Outcome: Progressing     Problem: Anxiety  Goal: Anxiety is at manageable level  Description: Interventions:  - Assess and monitor patient's anxiety level.   - Monitor for signs and symptoms (heart palpitations, chest pain, shortness of breath, headaches, nausea, feeling jumpy, restlessness, irritable,  apprehensive).   - Collaborate with interdisciplinary team and initiate plan and interventions as ordered.  - [place] patient to unit/surroundings  - Explain treatment plan  - Encourage participation in care  - Encourage verbalization of concerns/fears  - Identify coping mechanisms  - Assist in developing anxiety-reducing skills  - Administer/offer alternative therapies  - Limit or eliminate stimulants  Outcome: Progressing     Problem: Alteration in Orientation  Goal: Treatment Goal: Demonstrate a reduction of confusion and improved orientation to person, place, time and/or situation upon discharge, according to optimum baseline/ability  Outcome: Progressing     Problem: Alteration in Thoughts and Perception  Goal: Attend and participate in unit activities, including therapeutic, recreational, and educational groups  Description: Interventions:  -Encourage Visitation and family involvement in care  Outcome: Progressing     Problem: SAFETY ADULT  Goal: Patient will remain free of falls  Description: INTERVENTIONS:  - Educate patient/family on patient safety including physical limitations  - Instruct patient to call for assistance with activity   - Consider consulting OT/PT to assist with strengthening/mobility based on AM PAC & JH-HLM score  - Consult OT/PT to assist with strengthening/mobility   - Keep Call bell within reach  - Keep bed low and locked with side rails adjusted as appropriate  - Keep care items and personal belongings within reach  - Initiate and maintain comfort rounds  - Make Fall Risk Sign visible to staff  - Apply yellow socks and bracelet for high fall risk patients  - Consider moving patient to room near nurses station  Outcome: Progressing  Goal: Maintain or return to baseline ADL function  Description: INTERVENTIONS:  -  Assess patient's ability to carry out ADLs; assess patient's baseline for ADL function and identify physical deficits which impact ability to perform ADLs (bathing, care of  mouth/teeth, toileting, grooming, dressing, etc.)  - Assess/evaluate cause of self-care deficits   - Assess range of motion  - Assess patient's mobility; develop plan if impaired  - Assess patient's need for assistive devices and provide as appropriate  - Encourage maximum independence but intervene and supervise when necessary  - Involve family in performance of ADLs  - Assess for home care needs following discharge   - Consider OT consult to assist with ADL evaluation and planning for discharge  - Provide patient education as appropriate  - Monitor functional capacity and physical performance, use of AM PAC & JH-HLM   - Monitor gait, balance and fatigue with ambulation    Outcome: Progressing  Goal: Maintains/Returns to pre admission functional level  Description: INTERVENTIONS:  - Perform AM-PAC 6 Click Basic Mobility/ Daily Activity assessment daily.  - Set and communicate daily mobility goal to care team and patient/family/caregiver.   - Collaborate with rehabilitation services on mobility goals if consulted  - Out of bed for toileting  - Record patient progress and toleration of activity level   Outcome: Progressing     Problem: Nutrition/Hydration-ADULT  Goal: Nutrient/Hydration intake appropriate for improving, restoring or maintaining nutritional needs  Description: Monitor and assess patient's nutrition/hydration status for malnutrition. Collaborate with interdisciplinary team and initiate plan and interventions as ordered.  Monitor patient's weight and dietary intake as ordered or per policy. Utilize nutrition screening tool and intervene as necessary. Determine patient's food preferences and provide high-protein, high-caloric foods as appropriate.     INTERVENTIONS:  - Monitor oral intake, urinary output, labs, and treatment plans  - Assess nutrition and hydration status and recommend course of action  - Evaluate amount of meals eaten  - Assist patient with eating if necessary   - Allow adequate time for  meals  - Recommend/ encourage appropriate diets, oral nutritional supplements, and vitamin/mineral supplements  - Order, calculate, and assess calorie counts as needed  - Recommend, monitor, and adjust tube feedings and TPN/PPN based on assessed needs  - Assess need for intravenous fluids  - Provide specific nutrition/hydration education as appropriate  - Include patient/family/caregiver in decisions related to nutrition  Outcome: Progressing

## 2025-05-30 NOTE — TREATMENT TEAM
05/30/25 0900 05/30/25 1100 05/30/25 1330   Activity/Group Checklist   Group Other (Comment)  (Rec Tx: Open Rec (Group Games, Coloring, Music)) Music Other (Comment)  (Coping skills, leisure, mindfulness and reflection)   Attendance Attended Attended Did not attend   Attendance Duration (min) 46-60 31-45  --    Interactions Interacted appropriately Interacted appropriately  --    Affect/Mood Appropriate Appropriate  --    Goals Achieved Able to engage in interactions;Able to listen to others Able to listen to others;Able to engage in interactions  --

## 2025-05-30 NOTE — TREATMENT TEAM
05/30/25 0800   Team Meeting   Meeting Type Daily Rounds   Team Members Present   Team Members Present Physician;Nurse;;   Physician Team Member Dr. Muñiz / Dr. Lopez / Dr. Sahu / LISA Bernard   Nursing Team Member Munir/Roger   Care Management Team Member Terry   Social Work Team Member Samuel   Patient/Family Present   Patient Present No   Patient's Family Present No     Patient attended three groups, colostomy bag changed yesterday, eating 100%, bladder scans have been fine, patient medication over objection though taking PO. Patient discharge is pending stabilization of mood and medications.

## 2025-05-30 NOTE — NURSING NOTE
Berta is withdrawn to her room. She endorses depression and denies all other psych s/s. Medication compliant. Safety measures maintained.

## 2025-05-30 NOTE — CASE MANAGEMENT
"CM called [facility] to inquire on ICM services. CM provided patient's city and was directed to call [employer] to discuss ICM options.    JANINE received voice mail from Samantha at [employer] who notified  that patient would have to call Samantha on her direct line tel#[phone] and provide her with the information.     CM discussed this with patient though patient reported that she is not comfortable with [employer] as they \"suck\" and that she will figure out how to apply for SSDI through aging. Patient then disclosed that she was having vivid nightmares of having an argument with her daughter.     "

## 2025-05-30 NOTE — DISCHARGE INSTR - APPOINTMENTS
Behavioral Health Nurse Navigator, Anisha or Brenda will be calling you after your discharge, on the phone number that you provided.  They will be available as an additional support, if needed.   If you wish to speak with Anisha, you may contact her at [phone].

## 2025-05-30 NOTE — NURSING NOTE
Patient is alert and oriented able to verbalize her needs known. She is visible and social interacts with select peers. She is medication and meal compliant. She denies all psych s/s. No behaviors noted. Safety checks ongoing.
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
## 2025-05-30 NOTE — CASE MANAGEMENT
CM place call to [employer] tel#[phone]  to inquire on ICM. CM left voice message requesting call back.

## 2025-05-30 NOTE — ASSESSMENT & PLAN NOTE
Malnutrition Findings:   Adult Malnutrition type: Chronic illness  Adult Degree of Malnutrition: Other severe protein calorie malnutrition   Body mass index is 14.09 kg/m².   - nutritional support as indicated

## 2025-05-30 NOTE — CASE MANAGEMENT
CM place call to daughter tel#[phone] and left a voice message requesting c/b to discuss d/c and schedule visit per patient request.

## 2025-05-30 NOTE — PLAN OF CARE
Problem: OCCUPATIONAL THERAPY ADULT  Goal: Performs self-care activities at highest level of function for planned discharge setting.  See evaluation for individualized goals.  Description: Treatment Interventions: ADL retraining, Functional transfer training, UE strengthening/ROM, Endurance training, Continued evaluation, Energy conservation, Activityengagement          See flowsheet documentation for full assessment, interventions and recommendations.   Note: Limitation: Decreased ADL status, Decreased high-level ADLs, Decreased Safe judgement during ADL  Prognosis: Fair  Assessment: Pt is a 59 y.o. female seen for OT evaluation s/p admit to [facility] on 5/22/2025 w/ Bipolar disorder, current episode manic, severe (HCC).  See medical history above for extensive list of comorbidities affecting pt's functional performance at time of assessment. Personal factors affecting Pt at time of IE include:behavioral pattern and health management . Upon evaluation: Pt independent for functional ambulation including bathroom mobility and negotiation of small spaces, independent for ADL transfers.  Pt requires supervision for ADLs in standing. Pt's primary barrier(s) at this time: decreased problem-solving. Pt preoccupied and tangential at times requiring cues to redirect to task. The following deficits impact occupational performance: impaired sequencing, impaired problem solving, and decreased safety awareness. Pt to benefit from continued skilled OT services while in the hospital to address deficits as defined above and maximize level of functional independence w ADL's and functional mobility. Occupational performance areas to address include: health maintenance, community mobility, meal prep, money management, and household maintenance.      Rehab Resource Intensity Level, OT:  (see note)
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
## 2025-05-30 NOTE — OCCUPATIONAL THERAPY NOTE
Occupational Therapy Cognitive Evaluation     Patient Name: Berta Smart  Today's Date: 5/30/2025  Problem List  Principal Problem:    Bipolar disorder, current episode manic, severe (HCC)  Active Problems:    KRAIG (generalized anxiety disorder)    Tobacco use disorder    Colostomy in place (HCC)    Urinary retention    Severe protein-calorie malnutrition (HCC)    At high risk for electrolyte imbalance    Seizure (HCC)    PTSD (post-traumatic stress disorder)    History of alcohol use disorder    Medical clearance for psychiatric admission    Hyponatremia    Past Medical History  Past Medical History[1]  Past Surgical History  Past Surgical History[2]          05/29/25 1611   OT Last Visit   OT Visit Date 05/29/25   Note Type   Note type Evaluation   Pain Assessment   Pain Assessment Tool 0-10   Pain Score No Pain   Restrictions/Precautions   Weight Bearing Precautions Per Order No   Home Living   Type of Home House   Home Layout Two level;Bed/bath upstairs   Bathroom Shower/Tub Tub/shower unit   Bathroom Toilet Standard   Bathroom Accessibility Accessible   Prior Function   Level of [place] Independent with ADLs;Needs assistance with IADLS   Lives With Spouse;Daughter   Receives Help From Family   ADL   Grooming Assistance 5  Supervision/Setup   UB Bathing Assistance 5  Supervision/Setup   LB Bathing Assistance 5  Supervision/Setup   UB Dressing Assistance 5  Supervision/Setup   LB Dressing Assistance 5  Supervision/Setup   Toileting Assistance  5  Supervision/Setup   Transfers   Sit to Stand 7  Independent   Stand to Sit 7  Independent   Cognition   Arousal/Participation Alert;Cooperative   Attention Attends with cues to redirect   Orientation Level Oriented to person;Oriented to place;Oriented to time   Memory Within functional limits   Cognition Assessment Tools ACLS   Score   4.8    Administered [place] Cognitive Level Screen (ACLS).  The patient scored 4.8/6.0 indicating that they may live alone with  daily assistance to monitor personal safety and check problem solving effectiveness.      Behavior:  Asks for verification.  Knows how 2 concurrent schedules fit together.  Fits daily routine into schedule of others.  Can avoid obvious hazards.  More flexible and willing to adapt to new ideas.  Insight into disability is fair.  Processing is delayed.  May be able to get to a regularly scheduled appointment without need for assistance. May frequently stop a task to examine objects. Seeks verification from others.  Grooming:  Checks results of grooming, hair styling and make up application.  Learns new grooming procedures slowly.  May rigidly follow prescribed routines.  Dressing: Considers all striking features of garments including color, pattern, condition and fit.  May not attend to cleanliness or consider social standards.  May don clothing slowly and check results in mirror.  Bathing:  May coordinate bathing schedule with others.  Attends to all features of bathing environment.  May check results and vary rate.  Reports low supplies to caregivers.   Walking/Exercising:  Learns new routes over several days.  Scans environment, reads street signs and attempts to use this information but may still get lost.  Understands explanations of safety hazards.  Learns an exercise program and does it without variation.  Eating:  Eats and attends to social conversation though may not be able to talk and eat at the same time.  Manages all utensils.  Follows a meal time schedule.  May be reminded to check food temperatures.  Toileting:  Independently performs usual toileting routine.  May not anticipate use of bathroom before trips.  May learn bathroom etiquette like conserving paper.  Medications:  Follows new prescriptions slowly.  Considers variables like time of day and amount but with difficulty.  Follows verbal explanations.  May follow a set schedule set up by others for renewing prescriptions.  Utilize a daily pill box  (set up by someone else).  Use of adaptive equipment:  Recognizes loss of strength, sensation and balance.  May learn a series of new actions slowly.    Housekeeping:  Initiates and completes a routine of housekeeping activities.  Learns new procedures slowly.  Notes all visible effects of cleaning and checking results.  Corrects visible errors one at a time.  May not anticipate supplies but reports low supplies to caregiver.  May not identify potential hazards related to electric, gas, toxins, poisons and improper storage/disposal of items.  Food preparation:  Does not plan for long term food needs.  May learn to follow a plan for purchasing food items.  May follow a weekly schedule for shopping.  Memorizes a new sequence of actions to prepare dishes.  Follows a written recipe in a rigid manner.  Check stove after use.  Spending Money:  Slowly follows budget or learns money management procedures.  May need assistance to adjust to change in income or usual banking procedures.  Shopping: Learns directions to new shopping areas.  May learn to use a shopping list or use money saving procedures like coupons.  May not read labels or consider whether it is practical or affordable.  Laundry:  Completes laundry routine in a fixed manner.  Memorizes procedures like using a new [employer].  May try to read new labels but needs assistance in interpreting them.  May overload machine.  Traveling:  Learns new routes or travel procedures slowly.  May learn to use a wheelchair but has difficulty maneuvering or estimating space requirements.  Does not anticipate hazards.  May follow safety precautions pointed out by others.  Telephone:  Learns to use a new telephone answering machine or pay phone slowly.  Does not vary actions. May memorize correct times to call others.            Assessment   Limitation Decreased ADL status;Decreased high-level ADLs;Decreased Safe judgement during ADL   Prognosis Fair   Assessment   Pt is a 59 y.o.  female seen for OT evaluation s/p admit to [facility] on 5/22/2025 w/ Bipolar disorder, current episode manic, severe (HCC).  See medical history above for extensive list of comorbidities affecting pt's functional performance at time of assessment. Personal factors affecting Pt at time of IE include:behavioral pattern and health management . Upon evaluation: Pt independent for functional ambulation including bathroom mobility and negotiation of small spaces, independent for ADL transfers.  Pt requires supervision for ADLs in standing. Pt's primary barrier(s) at this time: decreased problem-solving. Pt preoccupied and tangential at times requiring cues to redirect to task. The following deficits impact occupational performance: impaired sequencing, impaired problem solving, and decreased safety awareness. Pt to benefit from continued skilled OT services while in the hospital to address deficits as defined above and maximize level of functional independence w ADL's and functional mobility. Occupational performance areas to address include: health maintenance, community mobility, meal prep, money management, and household maintenance.      Goals   STG Time Frame   (1-2 more sessions)   Short Term Goals Pt will complete IADL Nate/I.   Pt will complete med management Nate/I.    Plan   Treatment Interventions ADL retraining;Functional transfer training;UE strengthening/ROM;Endurance training;Continued evaluation;Energy conservation;Activityengagement   Goal Expiration Date 06/12/25   OT Frequency   (1-2 more sessions)   Discharge Recommendation   Rehab Resource Intensity Level, OT   The patient scored 4.8/6.0 indicating that they may live alone with daily assistance to monitor personal safety and check problem solving effectiveness.      Recommend oversight with medications including management of pill box.             [1]   Past Medical History:  Diagnosis Date    Alcoholism (HCC)     Depression     Hypothyroidism     PTSD  (post-traumatic stress disorder)    [2]   Past Surgical History:  Procedure Laterality Date    COLOSTOMY      CYSTOSCOPY W/ URETERAL STENT PLACEMENT Left     EXPLORATORY LAPAROTOMY      SALPINGECTOMY Bilateral 02/2025

## 2025-05-31 LAB — GLUCOSE SERPL-MCNC: 133 MG/DL (ref 65–140)

## 2025-05-31 PROCEDURE — 82948 REAGENT STRIP/BLOOD GLUCOSE: CPT

## 2025-05-31 PROCEDURE — 99232 SBSQ HOSP IP/OBS MODERATE 35: CPT | Performed by: PSYCHIATRY & NEUROLOGY

## 2025-05-31 RX ADMIN — OLANZAPINE 5 MG: 5 TABLET, FILM COATED ORAL at 21:12

## 2025-05-31 RX ADMIN — TAMSULOSIN HYDROCHLORIDE 0.4 MG: 0.4 CAPSULE ORAL at 16:01

## 2025-05-31 RX ADMIN — DIVALPROEX SODIUM 250 MG: 125 CAPSULE, COATED PELLETS ORAL at 08:13

## 2025-05-31 RX ADMIN — FLUTICASONE FUROATE AND VILANTEROL TRIFENATATE 1 PUFF: 100; 25 POWDER RESPIRATORY (INHALATION) at 08:13

## 2025-05-31 RX ADMIN — DIVALPROEX SODIUM 250 MG: 125 CAPSULE, COATED PELLETS ORAL at 21:12

## 2025-05-31 RX ADMIN — HYDROXYZINE HYDROCHLORIDE 25 MG: 25 TABLET, FILM COATED ORAL at 13:49

## 2025-05-31 RX ADMIN — Medication 3 MG: at 21:12

## 2025-05-31 RX ADMIN — DIVALPROEX SODIUM 250 MG: 125 CAPSULE, COATED PELLETS ORAL at 16:01

## 2025-05-31 RX ADMIN — NICOTINE POLACRILEX 2 MG: 2 GUM, CHEWING BUCCAL at 06:05

## 2025-05-31 NOTE — PLAN OF CARE
Problem: Alteration in Thoughts and Perception  Goal: Treatment Goal: Gain control of psychotic behaviors/thinking, reduce/eliminate presenting symptoms and demonstrate improved reality functioning upon discharge  Outcome: Progressing     Problem: Anxiety  Goal: Anxiety is at manageable level  Description: Interventions:  - Assess and monitor patient's anxiety level.   - Monitor for signs and symptoms (heart palpitations, chest pain, shortness of breath, headaches, nausea, feeling jumpy, restlessness, irritable, apprehensive).   - Collaborate with interdisciplinary team and initiate plan and interventions as ordered.  - [place] patient to unit/surroundings  - Explain treatment plan  - Encourage participation in care  - Encourage verbalization of concerns/fears  - Identify coping mechanisms  - Assist in developing anxiety-reducing skills  - Administer/offer alternative therapies  - Limit or eliminate stimulants  Outcome: Progressing     Problem: Alteration in Orientation  Goal: Treatment Goal: Demonstrate a reduction of confusion and improved orientation to person, place, time and/or situation upon discharge, according to optimum baseline/ability  Outcome: Progressing     Problem: Nutrition/Hydration-ADULT  Goal: Nutrient/Hydration intake appropriate for improving, restoring or maintaining nutritional needs  Description: Monitor and assess patient's nutrition/hydration status for malnutrition. Collaborate with interdisciplinary team and initiate plan and interventions as ordered.  Monitor patient's weight and dietary intake as ordered or per policy. Utilize nutrition screening tool and intervene as necessary. Determine patient's food preferences and provide high-protein, high-caloric foods as appropriate.     INTERVENTIONS:  - Monitor oral intake, urinary output, labs, and treatment plans  - Assess nutrition and hydration status and recommend course of action  - Evaluate amount of meals eaten  - Assist patient with  eating if necessary   - Allow adequate time for meals  - Recommend/ encourage appropriate diets, oral nutritional supplements, and vitamin/mineral supplements  - Order, calculate, and assess calorie counts as needed  - Recommend, monitor, and adjust tube feedings and TPN/PPN based on assessed needs  - Assess need for intravenous fluids  - Provide specific nutrition/hydration education as appropriate  - Include patient/family/caregiver in decisions related to nutrition  Outcome: Progressing

## 2025-05-31 NOTE — ASSESSMENT & PLAN NOTE
Malnutrition Findings:   Adult Malnutrition type: Chronic illness  Adult Degree of Malnutrition: Other severe protein calorie malnutrition   Body mass index is 14.21 kg/m².   - nutritional support as indicated
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
## 2025-05-31 NOTE — NURSING NOTE
"Patient visible throughout milieu interacting with staff and peers. Patient pleasant on approach, denied SI/HI/AVH. Patient displays circumstantial speech, requires some redirection. Patient denied SI/HI/AVH. Patient did report that she felt \"my blood sugar is low.\" Blood sugar assessed to be 133. Patient then stated \"Okay, then I feel alright.\" Patient displays steady gait. Patient able to and encouraged to inform staff of any needs.   "

## 2025-06-01 PROCEDURE — 99232 SBSQ HOSP IP/OBS MODERATE 35: CPT | Performed by: PSYCHIATRY & NEUROLOGY

## 2025-06-01 RX ADMIN — FLUTICASONE FUROATE AND VILANTEROL TRIFENATATE 1 PUFF: 100; 25 POWDER RESPIRATORY (INHALATION) at 08:19

## 2025-06-01 RX ADMIN — NICOTINE POLACRILEX 2 MG: 2 GUM, CHEWING BUCCAL at 13:59

## 2025-06-01 RX ADMIN — NICOTINE POLACRILEX 2 MG: 2 GUM, CHEWING BUCCAL at 10:00

## 2025-06-01 RX ADMIN — OLANZAPINE 5 MG: 5 TABLET, FILM COATED ORAL at 21:19

## 2025-06-01 RX ADMIN — DIVALPROEX SODIUM 250 MG: 125 CAPSULE, COATED PELLETS ORAL at 21:19

## 2025-06-01 RX ADMIN — DIVALPROEX SODIUM 250 MG: 125 CAPSULE, COATED PELLETS ORAL at 08:18

## 2025-06-01 RX ADMIN — DIVALPROEX SODIUM 250 MG: 125 CAPSULE, COATED PELLETS ORAL at 15:42

## 2025-06-01 RX ADMIN — Medication 3 MG: at 21:19
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
## 2025-06-01 NOTE — NURSING NOTE
Patient visible in milieu, interacting with staff and peers. Patient pleasant and calm, denied SI/HI/AVH. Patient completed colostomy care independently. Patient accepted scheduled medications. Patient displays steady gait with walker. Patient able to and encouraged to inform staff of any needs.

## 2025-06-01 NOTE — ASSESSMENT & PLAN NOTE
Malnutrition Findings:   Adult Malnutrition type: Chronic illness  Adult Degree of Malnutrition: Other severe protein calorie malnutrition   Body mass index is 14.43 kg/m².   - nutritional support as indicated
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
## 2025-06-01 NOTE — PLAN OF CARE
Problem: Alteration in Thoughts and Perception  Goal: Treatment Goal: Gain control of psychotic behaviors/thinking, reduce/eliminate presenting symptoms and demonstrate improved reality functioning upon discharge  Outcome: Progressing     Problem: Ineffective Coping  Goal: Participates in unit activities  Description: Interventions:  - Provide therapeutic environment   - Provide required programming   - Redirect inappropriate behaviors   Outcome: Progressing     Problem: Depression  Goal: Treatment Goal: Demonstrate behavioral control of depressive symptoms, verbalize feelings of improved mood/affect, and adopt new coping skills prior to discharge  Outcome: Progressing     Problem: Anxiety  Goal: Anxiety is at manageable level  Description: Interventions:  - Assess and monitor patient's anxiety level.   - Monitor for signs and symptoms (heart palpitations, chest pain, shortness of breath, headaches, nausea, feeling jumpy, restlessness, irritable, apprehensive).   - Collaborate with interdisciplinary team and initiate plan and interventions as ordered.  - [place] patient to unit/surroundings  - Explain treatment plan  - Encourage participation in care  - Encourage verbalization of concerns/fears  - Identify coping mechanisms  - Assist in developing anxiety-reducing skills  - Administer/offer alternative therapies  - Limit or eliminate stimulants  Outcome: Progressing     Problem: Alteration in Orientation  Goal: Treatment Goal: Demonstrate a reduction of confusion and improved orientation to person, place, time and/or situation upon discharge, according to optimum baseline/ability  Outcome: Progressing     Problem: SAFETY ADULT  Goal: Patient will remain free of falls  Description: INTERVENTIONS:  - Educate patient/family on patient safety including physical limitations  - Instruct patient to call for assistance with activity   - Consider consulting OT/PT to assist with strengthening/mobility based on AM PAC &  -[facility] score  - Consult OT/PT to assist with strengthening/mobility   - Keep Call bell within reach  - Keep bed low and locked with side rails adjusted as appropriate  - Keep care items and personal belongings within reach  - Initiate and maintain comfort rounds  - Make Fall Risk Sign visible to staff  - Apply yellow socks and bracelet for high fall risk patients  - Consider moving patient to room near nurses station  Outcome: Progressing  Goal: Maintain or return to baseline ADL function  Description: INTERVENTIONS:  -  Assess patient's ability to carry out ADLs; assess patient's baseline for ADL function and identify physical deficits which impact ability to perform ADLs (bathing, care of mouth/teeth, toileting, grooming, dressing, etc.)  - Assess/evaluate cause of self-care deficits   - Assess range of motion  - Assess patient's mobility; develop plan if impaired  - Assess patient's need for assistive devices and provide as appropriate  - Encourage maximum independence but intervene and supervise when necessary  - Involve family in performance of ADLs  - Assess for home care needs following discharge   - Consider OT consult to assist with ADL evaluation and planning for discharge  - Provide patient education as appropriate  - Monitor functional capacity and physical performance, use of AM PAC & -[facility]   - Monitor gait, balance and fatigue with ambulation    Outcome: Progressing  Goal: Maintains/Returns to pre admission functional level  Description: INTERVENTIONS:  - Perform AM-PAC 6 Click Basic Mobility/ Daily Activity assessment daily.  - Set and communicate daily mobility goal to care team and patient/family/caregiver.   - Collaborate with rehabilitation services on mobility goals if consulted  - Out of bed for toileting  - Record patient progress and toleration of activity level   Outcome: Progressing

## 2025-06-02 DIAGNOSIS — E43 SEVERE PROTEIN-CALORIE MALNUTRITION (HCC): ICD-10-CM

## 2025-06-02 PROCEDURE — 99232 SBSQ HOSP IP/OBS MODERATE 35: CPT | Performed by: STUDENT IN AN ORGANIZED HEALTH CARE EDUCATION/TRAINING PROGRAM

## 2025-06-02 RX ORDER — ERGOCALCIFEROL 1.25 MG/1
50000 CAPSULE, LIQUID FILLED ORAL WEEKLY
Qty: 4 CAPSULE | Refills: 0 | Status: SHIPPED | OUTPATIENT
Start: 2025-06-06 | End: 2025-07-19

## 2025-06-02 RX ORDER — ERGOCALCIFEROL 1.25 MG/1
CAPSULE, LIQUID FILLED ORAL
Qty: 4 CAPSULE | Refills: 0 | OUTPATIENT
Start: 2025-06-02

## 2025-06-02 RX ORDER — DIVALPROEX SODIUM 125 MG/1
250 CAPSULE, COATED PELLETS ORAL 3 TIMES DAILY
Qty: 180 CAPSULE | Refills: 0 | Status: SHIPPED | OUTPATIENT
Start: 2025-06-02 | End: 2025-07-02

## 2025-06-02 RX ORDER — OLANZAPINE 5 MG/1
5 TABLET, FILM COATED ORAL
Qty: 30 TABLET | Refills: 0 | Status: SHIPPED | OUTPATIENT
Start: 2025-06-02 | End: 2025-07-02

## 2025-06-02 RX ADMIN — DIVALPROEX SODIUM 250 MG: 125 CAPSULE, COATED PELLETS ORAL at 16:47

## 2025-06-02 RX ADMIN — NICOTINE POLACRILEX 2 MG: 2 GUM, CHEWING BUCCAL at 20:26

## 2025-06-02 RX ADMIN — DIVALPROEX SODIUM 250 MG: 125 CAPSULE, COATED PELLETS ORAL at 08:23

## 2025-06-02 RX ADMIN — OLANZAPINE 5 MG: 5 TABLET, FILM COATED ORAL at 21:20

## 2025-06-02 RX ADMIN — Medication 3 MG: at 21:20

## 2025-06-02 RX ADMIN — DIVALPROEX SODIUM 250 MG: 125 CAPSULE, COATED PELLETS ORAL at 21:20

## 2025-06-02 RX ADMIN — FLUTICASONE FUROATE AND VILANTEROL TRIFENATATE 1 PUFF: 100; 25 POWDER RESPIRATORY (INHALATION) at 08:23

## 2025-06-02 RX ADMIN — NICOTINE POLACRILEX 2 MG: 2 GUM, CHEWING BUCCAL at 13:01

## 2025-06-02 RX ADMIN — NICOTINE POLACRILEX 2 MG: 2 GUM, CHEWING BUCCAL at 08:40

## 2025-06-02 NOTE — TREATMENT TEAM
06/02/25 0800   Team Meeting   Meeting Type Daily Rounds   Team Members Present   Team Members Present Physician;Nurse;;   Physician Team Member Dr. Muñiz / Dr. Lopez / Dr. Sahu / LISA Bernard   Nursing Team Member Munir/Roger   Care Management Team Member Terry   Social Work Team Member Samuel   Patient/Family Present   Patient Present No   Patient's Family Present No     Patient is medication over objection but is taking medication, refused phone call with daughter on Saturday and was anxious, given atarax. Patient discharge to be tomorrow.

## 2025-06-02 NOTE — TREATMENT TEAM
Pt completed admission Bh relapse prevention.  Pt signed and copy in chart.  Crisis, wamline and 988 numbers provided.   Pt aware of d/c.  Pharmacy [facility].  Pt did not know Provider .  Pt attends groups.      06/02/25 1100   Activity/Group Checklist   Group Admission/Discharge   Attendance Attended   Attendance Duration (min) 31-45   Interactions Interacted appropriately   Affect/Mood Appropriate   Goals Achieved Identified feelings;Identified triggers;Identified relapse prevention strategies;Discussed coping strategies;Discussed discharge plans;Able to listen to others;Able to engage in interactions;Identified distorted thoughts/beliefs;Identified resources and support systems;Able to reflect/comment on own behavior;Able to manage/cope with feelings;Verbalized increased hopefulness;Able to self-disclose;Able to recieve feedback
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
## 2025-06-02 NOTE — CASE MANAGEMENT
CM place call to Kenya (sister) tel#[phone] to discuss d/c for tomorrow. Kenya reported that they are unable to pikc her up though spoke with Mayra who stated that she is able to. Kenya reminded Bonilla () to bring patient's walker with them tomorrow.     CM call Mayra tel#[phone] for d/c time tomorrow and to discuss d/c planning. Mayra is able to pick patient up at 3pm tomorrow.
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
## 2025-06-02 NOTE — PLAN OF CARE
Problem: DISCHARGE PLANNING - CARE MANAGEMENT  Goal: Discharge to post-acute care or home with appropriate resources  Description: INTERVENTIONS:  - Conduct assessment to determine patient/family and health care team treatment goals, and need for post-acute services based on payer coverage, community resources, and patient preferences, and barriers to discharge  - Address psychosocial, clinical, and financial barriers to discharge as identified in assessment in conjunction with the patient/family and health care team  - Arrange appropriate level of post-acute services according to patient’s   needs and preference and payer coverage in collaboration with the physician and health care team  - Communicate with and update the patient/family, physician, and health care team regarding progress on the discharge plan  - Arrange appropriate transportation to post-acute venues  Outcome: Adequate for Discharge  Pt to D/C today at 3pm. Pt denies SI/HI/AVH. Pt oriented x3. Pt to d/c to home and daughter will  upon discharge. Pt to follow up with [facility] on 06/10/25. Pt to follow up with ICM referral. Scripts sent to preferred pharmacy.     Discharge Address: [address] [place] [id number]   Phone:  [phone]
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
## 2025-06-02 NOTE — PROGRESS NOTES
Pt attended groups.  Pt able to self express and cooperative  Pt making slow and steady progress towards mh recovery goals.  Pt hopeful of d/c tomorrow and working with her 12 step program.      06/02/25 0900 06/02/25 1000 06/02/25 1330   Activity/Group Checklist   Group Other (Comment)  (Rec Tx: Reyes) Community meeting Other (Comment)  (Community supports and info)   Attendance Attended Attended Attended   Attendance Duration (min) 46-60 46-60 46-60   Interactions Interacted appropriately Interacted appropriately Interacted appropriately   Affect/Mood Appropriate Appropriate;Calm Appropriate   Goals Achieved Able to engage in interactions;Able to listen to others Identified feelings;Identified triggers;Identified relapse prevention strategies;Discussed coping strategies;Discussed self-esteem issues;Identified distorted thoughts/beliefs;Identified resources and support systems Identified triggers;Identified feelings;Identified relapse prevention strategies;Discussed coping strategies;Discussed self-esteem issues;Able to listen to others;Able to engage in interactions;Identified resources and support systems

## 2025-06-02 NOTE — PLAN OF CARE
Problem: Alteration in Thoughts and Perception  Goal: Treatment Goal: Gain control of psychotic behaviors/thinking, reduce/eliminate presenting symptoms and demonstrate improved reality functioning upon discharge  Outcome: Progressing     Problem: Ineffective Coping  Goal: Participates in unit activities  Description: Interventions:  - Provide therapeutic environment   - Provide required programming   - Redirect inappropriate behaviors   Outcome: Progressing     Problem: Depression  Goal: Treatment Goal: Demonstrate behavioral control of depressive symptoms, verbalize feelings of improved mood/affect, and adopt new coping skills prior to discharge  Outcome: Progressing     Problem: Anxiety  Goal: Anxiety is at manageable level  Description: Interventions:  - Assess and monitor patient's anxiety level.   - Monitor for signs and symptoms (heart palpitations, chest pain, shortness of breath, headaches, nausea, feeling jumpy, restlessness, irritable, apprehensive).   - Collaborate with interdisciplinary team and initiate plan and interventions as ordered.  - [place] patient to unit/surroundings  - Explain treatment plan  - Encourage participation in care  - Encourage verbalization of concerns/fears  - Identify coping mechanisms  - Assist in developing anxiety-reducing skills  - Administer/offer alternative therapies  - Limit or eliminate stimulants  Outcome: Progressing     Problem: Alteration in Orientation  Goal: Treatment Goal: Demonstrate a reduction of confusion and improved orientation to person, place, time and/or situation upon discharge, according to optimum baseline/ability  Outcome: Progressing     Problem: Alteration in Thoughts and Perception  Goal: Attend and participate in unit activities, including therapeutic, recreational, and educational groups  Description: Interventions:  -Encourage Visitation and family involvement in care  Outcome: Progressing     Problem: SAFETY ADULT  Goal: Patient will remain  free of falls  Description: INTERVENTIONS:  - Educate patient/family on patient safety including physical limitations  - Instruct patient to call for assistance with activity   - Consider consulting OT/PT to assist with strengthening/mobility based on AM PAC & JH-HLM score  - Consult OT/PT to assist with strengthening/mobility   - Keep Call bell within reach  - Keep bed low and locked with side rails adjusted as appropriate  - Keep care items and personal belongings within reach  - Initiate and maintain comfort rounds  - Make Fall Risk Sign visible to staff  - Apply yellow socks and bracelet for high fall risk patients  - Consider moving patient to room near nurses station  Outcome: Progressing  Goal: Maintain or return to baseline ADL function  Description: INTERVENTIONS:  -  Assess patient's ability to carry out ADLs; assess patient's baseline for ADL function and identify physical deficits which impact ability to perform ADLs (bathing, care of mouth/teeth, toileting, grooming, dressing, etc.)  - Assess/evaluate cause of self-care deficits   - Assess range of motion  - Assess patient's mobility; develop plan if impaired  - Assess patient's need for assistive devices and provide as appropriate  - Encourage maximum independence but intervene and supervise when necessary  - Involve family in performance of ADLs  - Assess for home care needs following discharge   - Consider OT consult to assist with ADL evaluation and planning for discharge  - Provide patient education as appropriate  - Monitor functional capacity and physical performance, use of AM PAC & JH-HLM   - Monitor gait, balance and fatigue with ambulation    Outcome: Progressing  Goal: Maintains/Returns to pre admission functional level  Description: INTERVENTIONS:  - Perform AM-PAC 6 Click Basic Mobility/ Daily Activity assessment daily.  - Set and communicate daily mobility goal to care team and patient/family/caregiver.   - Collaborate with rehabilitation  services on mobility goals if consulted  - Out of bed for toileting  - Record patient progress and toleration of activity level   Outcome: Progressing

## 2025-06-02 NOTE — BH TRANSITION RECORD
Contact Information: If you have any questions, concerns, pended studies, tests and/or procedures, or emergencies regarding your inpatient behavioral health visit. Please contact [facility] [phone] and ask to speak to a , nurse or physician. A contact is available 24 hours/ 7 days a week at this number.     Summary of Procedures Performed During your Stay:  Below is a list of major procedures performed during your hospital stay and a summary of results:  - Cardiac Procedures/Studies: ECG.  Normal sinus rhythm  Right superior axis deviation  Abnormal ECG   Major Imaging Studies:  CT head without contrast-negative    Pending Studies (From admission, onward)      None          Please follow up on the above pending studies with your PCP and/or referring provider.

## 2025-06-03 ENCOUNTER — TELEPHONE (OUTPATIENT)
Dept: FAMILY MEDICINE CLINIC | Facility: CLINIC | Age: 60
End: 2025-06-03

## 2025-06-03 VITALS
HEART RATE: 94 BPM | DIASTOLIC BLOOD PRESSURE: 62 MMHG | TEMPERATURE: 98.7 F | HEIGHT: 67 IN | OXYGEN SATURATION: 97 % | BODY MASS INDEX: 14.46 KG/M2 | SYSTOLIC BLOOD PRESSURE: 94 MMHG | RESPIRATION RATE: 18 BRPM | WEIGHT: 92.13 LBS

## 2025-06-03 PROCEDURE — 99239 HOSP IP/OBS DSCHRG MGMT >30: CPT

## 2025-06-03 RX ORDER — ERGOCALCIFEROL 1.25 MG/1
CAPSULE, LIQUID FILLED ORAL
Qty: 4 CAPSULE | Refills: 0 | OUTPATIENT
Start: 2025-06-03

## 2025-06-03 RX ADMIN — DIVALPROEX SODIUM 250 MG: 125 CAPSULE, COATED PELLETS ORAL at 08:48

## 2025-06-03 RX ADMIN — NICOTINE POLACRILEX 2 MG: 2 GUM, CHEWING BUCCAL at 09:13

## 2025-06-03 RX ADMIN — HYDROXYZINE HYDROCHLORIDE 50 MG: 50 TABLET, FILM COATED ORAL at 14:17

## 2025-06-03 RX ADMIN — FLUTICASONE FUROATE AND VILANTEROL TRIFENATATE 1 PUFF: 100; 25 POWDER RESPIRATORY (INHALATION) at 08:48

## 2025-06-03 NOTE — TELEPHONE ENCOUNTER
Spoke with Asuncion, she has reached out to 25 locations/ referrals for home health. However every single one [facility]var[facility] snot accept her insurance. Has patient is getting released today at 2pm she called to see if we knew any home health that would accept her insurance. Informed her that I can ask around but there is a chance we do not know. If a case she does not know, she might have to call her insurance for guidance.

## 2025-06-03 NOTE — TELEPHONE ENCOUNTER
Asked around to see if anyone else knew where home health would accept [place] plan. No one really had any idea. Next recommendation would be to reach out to insurance to find out what would be in network. Sorry wish I had a better answer!

## 2025-06-03 NOTE — TELEPHONE ENCOUNTER
Attempted to call Litzy in regards to home health. Phone kept ringing, could not leave vm or speak with anyone.   Will try again.

## 2025-06-03 NOTE — TELEPHONE ENCOUNTER
Litzy called for an update. I read the provider message to her word for word and she is requesting a call back. Please advise

## 2025-06-03 NOTE — TREATMENT TEAM
06/03/25 0700   Team Meeting   Meeting Type Daily Rounds   Team Members Present   Team Members Present Physician;Nurse;;   Physician Team Member Dr. Muñiz / Dr. Lopez / Dr. Sahu / LISA Bernard   Nursing Team Member Munir/Roger   Care Management Team Member Terry   Social Work Team Member Samuel   Patient/Family Present   Patient Present No   Patient's Family Present No     Patient discharge today at 3pm, denies all signs and symptoms.

## 2025-06-03 NOTE — PLAN OF CARE
Problem: Depression  Goal: Treatment Goal: Demonstrate behavioral control of depressive symptoms, verbalize feelings of improved mood/affect, and adopt new coping skills prior to discharge  Outcome: Progressing     Problem: Anxiety  Goal: Anxiety is at manageable level  Description: Interventions:  - Assess and monitor patient's anxiety level.   - Monitor for signs and symptoms (heart palpitations, chest pain, shortness of breath, headaches, nausea, feeling jumpy, restlessness, irritable, apprehensive).   - Collaborate with interdisciplinary team and initiate plan and interventions as ordered.  - [place] patient to unit/surroundings  - Explain treatment plan  - Encourage participation in care  - Encourage verbalization of concerns/fears  - Identify coping mechanisms  - Assist in developing anxiety-reducing skills  - Administer/offer alternative therapies  - Limit or eliminate stimulants  Outcome: Progressing     Problem: Ineffective Coping  Goal: Participates in unit activities  Description: Interventions:  - Provide therapeutic environment   - Provide required programming   - Redirect inappropriate behaviors   Outcome: Progressing

## 2025-06-03 NOTE — CASE MANAGEMENT
CM place call to Maritza ([facility]) tel#[phone]. CM left message requesting c/b and informed of d/c time today.     CM place call to PCP tel#[phone] to schedule TCM. Spoke with Kaitlin 6/11 2:45pm  Discussed visiting nurses going to send message to girls in the back to inquire on possible agencies for patient VNS.    CM place call Mayra tel#[phone] to discuss d/c time per patient's request. Mayra reported that she is on her way as she got out of work early and can be to patient by 2pm.    JANINE spoke with Taylor from PCP office to discuss VNS services and inquired if she'd be able to look into different agencies in the area that potentially take patient's insurance due to CM being unable to locate them. CM then inquired if PCP is able to find services to call patient to discuss and set up services as patient is to d/c today at 2pm.

## 2025-06-03 NOTE — TELEPHONE ENCOUNTER
TCM scheduled for 6/11.  Patient was in [place] for [facility]    Facility admitted to:   [facility]   Date of admission:   5/22/25   Date of discharge:   6/3/25   How are you feeling?      Were you able to  any ordered medications?      Advise patient to call the office or head back to the hospital with any new or worsening symptoms.

## 2025-06-03 NOTE — NURSING NOTE
Patient was visible and social with select peers in the milieu while walking the hallway. Denies all psych s/s. No behaviors noted. No c/o pain. Needed ostomy bag which was supplied to her. Took her HS medications. Safety checks ongoing.

## 2025-06-03 NOTE — TREATMENT TEAM
06/03/25 1400   Higgins Anxiety Scale   Anxious Mood 4   Tension 4   Fears 4   Insomnia 0   Intellectual 0   Depressed Mood 0   Somatic Complaints: Muscular 0   Somatic Complaints: Sensory 0   Cardiovascular Symptoms 0   Respiratory Symptoms 0   Gastrointestinal Symptoms 0   Genitourinary Symptoms 4   Autonomic Symptoms 4   Behavior at Interview 3   Higgins Anxiety Score 23     Prn Atarax 50 mg given per pt request.

## 2025-06-03 NOTE — TELEPHONE ENCOUNTER
Litzy from [place] called, she is trying to set up home care for gm. She said everyone she contacted doesn't take [place] first. She wanted to know if we specially knew of home care that accepts that insurance. Litzy can be reached at [phone]

## 2025-06-03 NOTE — NURSING NOTE
Pt is in agreement to discharged . Printed discharged instructions and reviewed . Pt verbalized understanding. Pt left the unit with all her belongings.

## 2025-06-04 ENCOUNTER — TELEPHONE (OUTPATIENT)
Dept: PSYCHIATRY | Facility: CLINIC | Age: 60
End: 2025-06-04

## 2025-06-04 NOTE — TELEPHONE ENCOUNTER
One week follow up call for New Patient appointment with Donavan Merino on 6/17/25  was made on 6/4/25. Writer informed patient of New Patient paperwork needing to be completed 5 days prior to the appointment. Writer confirmed paperwork has been sent via [employer].    Appointment was made on: 5/29/25
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
## 2025-06-11 ENCOUNTER — TRANSITIONAL CARE MANAGEMENT (OUTPATIENT)
Dept: FAMILY MEDICINE CLINIC | Facility: CLINIC | Age: 60
End: 2025-06-11

## 2025-06-11 ENCOUNTER — OFFICE VISIT (OUTPATIENT)
Dept: FAMILY MEDICINE CLINIC | Facility: CLINIC | Age: 60
End: 2025-06-11
Payer: COMMERCIAL

## 2025-06-11 VITALS
TEMPERATURE: 99.2 F | RESPIRATION RATE: 20 BRPM | WEIGHT: 96 LBS | SYSTOLIC BLOOD PRESSURE: 100 MMHG | HEIGHT: 67 IN | OXYGEN SATURATION: 97 % | BODY MASS INDEX: 15.07 KG/M2 | HEART RATE: 101 BPM | DIASTOLIC BLOOD PRESSURE: 64 MMHG

## 2025-06-11 DIAGNOSIS — J44.9 CHRONIC OBSTRUCTIVE PULMONARY DISEASE, UNSPECIFIED COPD TYPE (HCC): ICD-10-CM

## 2025-06-11 DIAGNOSIS — F31.13 BIPOLAR DISORDER, CURRENT EPISODE MANIC, SEVERE, UNSPECIFIED WHETHER PSYCHOTIC FEATURES (HCC): Primary | ICD-10-CM

## 2025-06-11 DIAGNOSIS — R13.10 DYSPHAGIA, UNSPECIFIED TYPE: ICD-10-CM

## 2025-06-11 DIAGNOSIS — E87.1 HYPONATREMIA: ICD-10-CM

## 2025-06-11 PROCEDURE — 99495 TRANSJ CARE MGMT MOD F2F 14D: CPT | Performed by: STUDENT IN AN ORGANIZED HEALTH CARE EDUCATION/TRAINING PROGRAM

## 2025-06-11 RX ORDER — FLUTICASONE PROPIONATE AND SALMETEROL 100; 50 UG/1; UG/1
1 POWDER RESPIRATORY (INHALATION) 2 TIMES DAILY
Qty: 60 BLISTER | Refills: 3 | Status: SHIPPED | OUTPATIENT
Start: 2025-06-11 | End: 2025-07-11

## 2025-06-11 NOTE — PROGRESS NOTES
Transition of Care Visit:  Name: Betra Smart      : 1965      MRN: [id number]  Encounter Provider: Isabella Turner MD  Encounter Date: 2025   Encounter department: [facility] PRACTICE    Assessment & Plan  Bipolar disorder, current episode manic, severe, unspecified whether psychotic features (HCC)  Continue Depakote   250 mg TID and olanzapine 5 mg at bedtime; she has follow up with Psychiatry next week. Return precautions discussed           Chronic obstructive pulmonary disease, unspecified COPD type (HCC)  Pt was unable to  the advair previously ordered due to cost w/o insurance    Notes she would like to assess price with current insurance plan     PFTs due; years of progressive GAMING   Orders:    Complete PFT with post bronchodilator; Future    Fluticasone-Salmeterol (Advair Diskus) 100-50 mcg/dose inhaler; Inhale 1 puff 2 (two) times a day Rinse mouth after use.    Dysphagia, unspecified type  Notes dysphagia progressive over past few months; GI referral placed  ED return precautions discussed   Orders:    Ambulatory Referral to Gastroenterology; Future    Hyponatremia  Mild hyponatremia normalized at discharge             History of Present Illness     Transitional Care Management Review:   Berta Smart is a 59 y.o. female here for TCM follow up.     During the TCM phone call patient stated:  TCM Call (since 2025)       Date and time call was made  6/3/2025 10:26 AM    Hospital care reviewed  Records reviewed    Patient was hospitial[facility] at  [facility]    Date of Admission  25    Date of discharge  25    Diagnosis  Severe protein malnutrition    Disposition  Home    Current Symptoms  None          TCM Call (since 2025)       I have advised the patient to call PCP with any new or worsening symptoms  Taylor Lord MA           60 yo F w/ markos of colon resection with colostomy in place, prior EtOH use, Bipolar do, PTSD, KRAIG, urinary  "retention  presenting for TCM. Pt was admitted from [id number] to [id number]. Noted aggressive behavior towards family members and was non compliant with medications. Initially admitted to medical service for AMS evaluation and then 302'ed to inpt psych     Here today for follow up           Review of Systems   Constitutional:  Negative for chills and fever.   HENT:  Positive for trouble swallowing.    Eyes:  Negative for visual disturbance.   Respiratory:  Negative for cough, shortness of breath, wheezing and stridor.    Cardiovascular:  Negative for chest pain and palpitations.   Gastrointestinal:  Negative for abdominal pain.   Genitourinary:  Negative for dysuria.   Musculoskeletal:  Negative for gait problem.   Skin:  Negative for color change and rash.   Neurological:  Negative for syncope.   All other systems reviewed and are negative.    Objective   /64 (BP Location: Right arm, Patient Position: Sitting, Cuff Size: Child)   Pulse 101   Temp 99.2 °F (37.3 °C) (Tympanic)   Resp 20   Ht 5' 7\" (1.702 m)   Wt 43.5 kg (96 lb)   SpO2 97%   BMI 15.04 kg/m²     Physical Exam  Vitals and nursing note reviewed.   Constitutional:       General: She is not in acute distress.     Appearance: Normal appearance. She is well-developed.   HENT:      Head: Normocephalic and atraumatic.     Eyes:      Conjunctiva/sclera: Conjunctivae normal.       Cardiovascular:      Rate and Rhythm: Normal rate and regular rhythm.      Pulses: Normal pulses.      Heart sounds: Normal heart sounds. No murmur heard.  Pulmonary:      Effort: Pulmonary effort is normal. No respiratory distress.      Breath sounds: Normal breath sounds. No wheezing or rales.   Abdominal:      General: There is no distension.      Palpations: Abdomen is soft.      Tenderness: There is no abdominal tenderness. There is no guarding.     Musculoskeletal:         General: No swelling.      Cervical back: Neck supple. No tenderness.      Right lower leg: No " edema.      Left lower leg: No edema.   Lymphadenopathy:      Cervical: No cervical adenopathy.     Skin:     General: Skin is warm and dry.      Capillary Refill: Capillary refill takes less than 2 seconds.     Neurological:      Mental Status: She is alert.      Motor: No weakness.     Psychiatric:         Mood and Affect: Mood normal.       Medications have been reviewed by provider in current encounter    Administrative Statements   I have spent a total time of 45 minutes in caring for this patient on the day of the visit/encounter including Prognosis, Risks and benefits of tx options, Instructions for management, Patient and family education, Importance of tx compliance, Risk factor reductions, Impressions, Counseling / Coordination of care, Documenting in the medical record, and Reviewing/placing orders in the medical record (including tests, medications, and/or procedures).

## 2025-06-11 NOTE — ASSESSMENT & PLAN NOTE
Continue Depakote   250 mg TID and olanzapine 5 mg at bedtime; she has follow up with Psychiatry next week. Return precautions discussed

## 2025-06-17 ENCOUNTER — OFFICE VISIT (OUTPATIENT)
Dept: PSYCHIATRY | Facility: CLINIC | Age: 60
End: 2025-06-17
Payer: COMMERCIAL

## 2025-06-17 DIAGNOSIS — F31.13 BIPOLAR DISORDER, CURRENT EPISODE MANIC, SEVERE (HCC): ICD-10-CM

## 2025-06-17 DIAGNOSIS — F43.10 PTSD (POST-TRAUMATIC STRESS DISORDER): ICD-10-CM

## 2025-06-17 DIAGNOSIS — Z87.898 HISTORY OF ALCOHOL USE DISORDER: ICD-10-CM

## 2025-06-17 DIAGNOSIS — F41.1 GAD (GENERALIZED ANXIETY DISORDER): Primary | ICD-10-CM

## 2025-06-17 PROCEDURE — 90792 PSYCH DIAG EVAL W/MED SRVCS: CPT

## 2025-06-17 RX ORDER — OLANZAPINE 5 MG/1
5 TABLET, FILM COATED ORAL
Qty: 30 TABLET | Refills: 2 | Status: SHIPPED | OUTPATIENT
Start: 2025-06-17 | End: 2025-09-15

## 2025-06-17 RX ORDER — DIVALPROEX SODIUM 125 MG/1
250 CAPSULE, COATED PELLETS ORAL 3 TIMES DAILY
Qty: 180 CAPSULE | Refills: 2 | Status: SHIPPED | OUTPATIENT
Start: 2025-06-17 | End: 2025-09-15

## 2025-06-17 NOTE — BH TREATMENT PLAN
TREATMENT PLAN (Medication Management Only)        [facility]    Name and Date of Birth:  Berta Smart 59 y.o. 1965  MRN: [id number]  Date of Treatment Plan: June 17, 2025  Diagnosis/Diagnoses:  Bipolar  Strengths/Personal Resources for Self-Care: supportive family, taking medications as prescribed, ability to adapt to life changes, ability to communicate needs, ability to communicate well, ability to listen, ability to reason, ability to understand psychiatric illness, average or above intelligence.  Area/Areas of need (in own words): mood instability  1. Long Term Goal:   continue improvement in mood.  Target Date:6 months - 12/17/2025  Person/Persons responsible for completion of goal: Berta  2.  Short Term Objective (s) - How will we reach this goal?:   A.  Provider new recommended medication/dosage changes and/or continue medication(s): continue current medications as prescribed.  B.  Maintain medication compliance.  C.  Attend all follow-up appointments as scheduled.  Target Date:6 months - 12/17/2025  Person/Persons Responsible for Completion of Goal: Berta  Progress Towards Goals: continuing treatment  Treatment Modality: medication management every 6 weeks  Review due 180 days from date of this plan: 6 months - 12/17/2025  Expected length of service: ongoing treatment unless revised  My Physician/PA/NP and I have developed this plan together and I agree to work on the goals and objectives. I understand the treatment goals that were developed for my treatment.   Electronic Signatures: on file (unless signed below)    SUZY Littlejohn 06/17/25

## 2025-06-17 NOTE — BH CRISIS PLAN
Client Name: Berta Smart       Client YOB: 1965    [place] Safety Plan      Creation Date: 6/17/25    Created By: SUZY Littlejohn       Step 1: Warning Signs:   Warning Signs   Freaking out   extremely vulgar language            Step 2: Internal Coping Strategies:   Internal Coping Strategies   walk   breathing            Step 3: People and social settings that provide distraction:   Name Contact Information   Sister             Step 4: People whom I can ask for help during a crisis:      Name Contact Information    Sister       Step 5: Professionals or agencies I can contact during a crisis:      Clinican/Agency Name Phone Emergency Contact    [facility] [phone]       Local Emergency Department Emergency Department Phone Emergency Department Address    911          Crisis Phone Numbers:   Suicide Prevention Lifeline: Call or Text  113 Crisis Text Line: Text HOME to 156-123   Please note: Some [facility] do not have a separate number for Child/Adolescent specific crisis. If your county is not listed under Child/Adolescent, please call the adult number for your county      Adult Crisis Numbers: Child/Adolescent Crisis Numbers   [facility]: [phone] [facility]: [phone]   [facility]: [phone] [facility]: [phone]   [facility]: [phone] [place]: [phone]   [facility]: [phone] [place]: [phone]   [facility]: [phone]   [facility]: [phone]   [facility]: [phone]   [facility]: [phone] (daytime) [phone] (after hours, weekends, holidays)      Step 6: Making the environment safer (plan for lethal means safety):   Patient did not identify any lethal methods: Yes     Optional: What is most important to me and worth living for?      [place] Safety Plan. Margaret Mcneal and Raleigh Slade. Used with permission of the authors.

## 2025-06-17 NOTE — ASSESSMENT & PLAN NOTE
-Continue Depakote Sprinkles 250 mg TID for mood   -Continue olanzapine 5 mg at bedtime mood   Orders:    OLANZapine (ZyPREXA) 5 mg tablet; Take 1 tablet (5 mg total) by mouth daily at bedtime    divalproex sodium (DEPAKOTE SPRINKLE) 125 MG capsule; Take 2 capsules (250 mg total) by mouth 3 (three) times a day

## 2025-06-18 ENCOUNTER — TELEPHONE (OUTPATIENT)
Age: 60
End: 2025-06-18

## 2025-06-18 NOTE — TELEPHONE ENCOUNTER
Patient called stating she had catheter removed during hospital stay on 5/22. She cancelled tomorrow's f/u due to not having catheter in place any longer. She wants to move forward with suprapubic catheter placement. Please call patient if appt for tomorrow should still be kept or what next steps should be.    PT CB: [phone]

## 2025-06-26 DIAGNOSIS — F31.13 BIPOLAR DISORDER, CURRENT EPISODE MANIC, SEVERE (HCC): ICD-10-CM

## 2025-06-27 RX ORDER — OLANZAPINE 5 MG/1
5 TABLET, FILM COATED ORAL
Qty: 30 TABLET | Refills: 0 | OUTPATIENT
Start: 2025-06-27 | End: 2025-09-25

## 2025-06-30 ENCOUNTER — HOSPITAL ENCOUNTER (OUTPATIENT)
Dept: PULMONOLOGY | Facility: HOSPITAL | Age: 60
Discharge: HOME | End: 2025-06-30
Attending: STUDENT IN AN ORGANIZED HEALTH CARE EDUCATION/TRAINING PROGRAM
Payer: COMMERCIAL

## 2025-06-30 DIAGNOSIS — J44.9 CHRONIC OBSTRUCTIVE PULMONARY DISEASE, UNSPECIFIED COPD TYPE (HCC): ICD-10-CM

## 2025-06-30 PROCEDURE — 94060 EVALUATION OF WHEEZING: CPT

## 2025-06-30 PROCEDURE — 94760 N-INVAS EAR/PLS OXIMETRY 1: CPT

## 2025-06-30 PROCEDURE — 94726 PLETHYSMOGRAPHY LUNG VOLUMES: CPT | Performed by: INTERNAL MEDICINE

## 2025-06-30 PROCEDURE — 94726 PLETHYSMOGRAPHY LUNG VOLUMES: CPT

## 2025-06-30 PROCEDURE — 94729 DIFFUSING CAPACITY: CPT

## 2025-06-30 PROCEDURE — 94729 DIFFUSING CAPACITY: CPT | Performed by: INTERNAL MEDICINE

## 2025-06-30 PROCEDURE — 94060 EVALUATION OF WHEEZING: CPT | Performed by: INTERNAL MEDICINE

## 2025-06-30 RX ORDER — OLANZAPINE 5 MG/1
5 TABLET, FILM COATED ORAL
Qty: 30 TABLET | Refills: 2 | OUTPATIENT
Start: 2025-06-30 | End: 2025-09-28

## 2025-06-30 RX ORDER — ALBUTEROL SULFATE 0.83 MG/ML
2.5 SOLUTION RESPIRATORY (INHALATION) EVERY 6 HOURS PRN
Status: DISCONTINUED | OUTPATIENT
Start: 2025-06-30 | End: 2025-07-04 | Stop reason: HOSPADM

## 2025-06-30 RX ORDER — DIVALPROEX SODIUM 125 MG/1
250 CAPSULE, COATED PELLETS ORAL 3 TIMES DAILY
Qty: 180 CAPSULE | Refills: 3 | OUTPATIENT
Start: 2025-06-30 | End: 2025-10-28

## 2025-06-30 RX ORDER — ALBUTEROL SULFATE 0.83 MG/ML
SOLUTION RESPIRATORY (INHALATION)
Status: COMPLETED
Start: 2025-06-30 | End: 2025-06-30

## 2025-06-30 RX ADMIN — ALBUTEROL SULFATE 2.5 MG: 0.83 SOLUTION RESPIRATORY (INHALATION) at 08:25

## 2025-06-30 RX ADMIN — ALBUTEROL SULFATE 2.5 MG: 2.5 SOLUTION RESPIRATORY (INHALATION) at 08:25

## 2025-06-30 NOTE — TELEPHONE ENCOUNTER
Called Berta ([phone]) and spoke to sister. Informed her that the script is still on file at the pharmacy and patient can call [facility] and ask them to fill it.

## 2025-06-30 NOTE — TELEPHONE ENCOUNTER
This is not a duplicate.   Patient did not  the scripts and the pharmacy put it back on the shelf.     Reason for call:   [x] Refill   [] Prior Auth  [] Other:     Office:   [] PCP/Provider -   [x] Specialty/Provider - [facility]  Authorized By: SUZY Littlejohn      Medication: divalproex sodium (DEPAKOTE SPRINKLE) 125 MG capsule    Dose/Frequency: Take 2 capsules (250 mg total) by mouth 3 (three) times a day,    Quantity: 180    Pharmacy: [address]   Does the patient have enough for 3 days?   [] Yes   [x] No - Send as HP to POD    Mail Away Pharmacy   Does the patient have enough for 10 days?   [] Yes   [] No - Send as HP to POD    Reason for call:   [x] Refill   [] Prior Auth  [] Other:     Office:   [] PCP/Provider -   [x] Specialty/Provider - [facility]  Authorized By: SUZY Littlejohn      Medication: OLANZapine (ZyPREXA) 5 mg tablet    Dose/Frequency: Take 1 tablet (5 mg total) by mouth daily at bedtime    Quantity: 30    Pharmacy: [address]   Does the patient have enough for 3 days?   [] Yes   [x] No - Send as HP to POD    Mail Away Pharmacy   Does the patient have enough for 10 days?   [] Yes   [] No - Send as HP to POD

## 2025-07-03 ENCOUNTER — HOSPITAL ENCOUNTER (OUTPATIENT)
Dept: CT IMAGING | Facility: HOSPITAL | Age: 60
Discharge: HOME | End: 2025-07-03
Attending: STUDENT IN AN ORGANIZED HEALTH CARE EDUCATION/TRAINING PROGRAM
Payer: COMMERCIAL

## 2025-07-03 DIAGNOSIS — Z12.2 SCREENING FOR LUNG CANCER: ICD-10-CM

## 2025-07-03 PROCEDURE — 71271 CT THORAX LUNG CANCER SCR C-: CPT

## 2025-07-10 ENCOUNTER — OFFICE VISIT (OUTPATIENT)
Dept: UROLOGY | Facility: HOSPITAL | Age: 60
End: 2025-07-10
Payer: COMMERCIAL

## 2025-07-10 VITALS
HEART RATE: 90 BPM | DIASTOLIC BLOOD PRESSURE: 60 MMHG | BODY MASS INDEX: 15.22 KG/M2 | WEIGHT: 97 LBS | OXYGEN SATURATION: 97 % | SYSTOLIC BLOOD PRESSURE: 110 MMHG | HEIGHT: 67 IN

## 2025-07-10 DIAGNOSIS — N32.89 BLADDER SPASMS: Primary | ICD-10-CM

## 2025-07-10 DIAGNOSIS — R33.9 URINARY RETENTION: ICD-10-CM

## 2025-07-10 LAB
POST-VOID RESIDUAL VOLUME, ML POC: 603 ML
SL AMB  POCT GLUCOSE, UA: NORMAL
SL AMB LEUKOCYTE ESTERASE,UA: 2
SL AMB POCT BILIRUBIN,UA: NORMAL
SL AMB POCT BLOOD,UA: 1
SL AMB POCT CLARITY,UA: YELLOW
SL AMB POCT COLOR,UA: NORMAL
SL AMB POCT KETONES,UA: NORMAL
SL AMB POCT NITRITE,UA: 1
SL AMB POCT PH,UA: NORMAL
SL AMB POCT SPECIFIC GRAVITY,UA: 1.01
SL AMB POCT URINE PROTEIN: NORMAL
SL AMB POCT UROBILINOGEN: NORMAL

## 2025-07-10 PROCEDURE — 51798 US URINE CAPACITY MEASURE: CPT

## 2025-07-10 PROCEDURE — 81002 URINALYSIS NONAUTO W/O SCOPE: CPT

## 2025-07-10 PROCEDURE — 87186 SC STD MICRODIL/AGAR DIL: CPT

## 2025-07-10 PROCEDURE — 51702 INSERT TEMP BLADDER CATH: CPT

## 2025-07-10 PROCEDURE — 87086 URINE CULTURE/COLONY COUNT: CPT

## 2025-07-10 PROCEDURE — 87077 CULTURE AEROBIC IDENTIFY: CPT

## 2025-07-10 PROCEDURE — 99204 OFFICE O/P NEW MOD 45 MIN: CPT

## 2025-07-10 RX ORDER — OXYBUTYNIN CHLORIDE 5 MG/1
5 TABLET ORAL 3 TIMES DAILY
Qty: 15 TABLET | Refills: 0 | Status: SHIPPED | OUTPATIENT
Start: 2025-07-10

## 2025-07-10 NOTE — PROGRESS NOTES
Name: Berta Smart      : 1965      MRN: [id number]  Encounter Provider: SUZY Esposito  Encounter Date: 7/10/2025   Encounter department: [facility] [place]  :  Assessment & Plan  Urinary retention  History of chronic urinary retention   Was recently taught to perform CIC 4x/day in May, 2025, however she is not interested in doing this   Hospitalized shortly after, requiring angelo catheter placement   Angelo was removed prior to discharge   UA today w/ positive leukocytes, nitrites, and blood  Will send out for urine culture   PVR today 603mls  New 16F angelo catheter placed in office today w/ >1L UO  Patient is interested in suprapubic catheter placement for management of chronic urinary retention   She is aware this will be maintained chronically with q4-6 week exchanges in the office   Consult placed to IR for SPT placement   She will maintain urethral angelo catheter until SPT placed   Orders:    Ambulatory Referral to Urology    POCT Measure PVR    POCT urine dip    Urine culture    Ambulatory Referral to Interventional Radiology; Future        History of Present Illness   Berta Smart is a 59 y.o. female who presents for follow up for urinary retention; referred back to office by Dr. Turner. Patient has a history of chronic urinary retention. She was hospitalized on 25 and found to be in urinary retention. A angelo catheter was placed at that time and she was discharged with angelo catheter after failing TOV. CT A/P on 25 showed marked bladder distention consistent with urinary retention. She was seen in consultation in the office on 25. Her PVR was >400cc and she was straight cathed for 550cc. She refused further workup including cystoscopy, urodynamics or angelo catheter placement. It was recommended that she proceed with CIC 4x/day. She presented to the office on 25 for CIC teaching. She presented back to the ED on 25 with acute encephalopathy. Angelo  "catheter was placed for urinary retention and removed prior to discharge. She is not interested in performing self catheterizations. She is interested in proceeding with a suprapubic catheter.       Review of Systems   Constitutional:  Negative for chills, diaphoresis, fatigue and fever.   Respiratory:  Negative for cough and shortness of breath.    Cardiovascular:  Negative for chest pain and palpitations.   Gastrointestinal:  Negative for abdominal pain, nausea and vomiting.   Genitourinary:  Positive for difficulty urinating. Negative for dysuria, flank pain, frequency, hematuria, pelvic pain and urgency.   Musculoskeletal:  Negative for back pain, gait problem and myalgias.   Skin:  Negative for pallor and wound.   Neurological:  Negative for dizziness, weakness, light-headedness and numbness.       Pertinent Medical History     Medical History Reviewed by provider this encounter:     .  Past Medical History   Past Medical History[1]  Past Surgical History[2]  Family History[3]   reports that she has been smoking cigarettes. She uses smokeless tobacco. She reports current alcohol use. She reports that she does not currently use drugs.  Current Outpatient Medications   Medication Instructions    divalproex sodium (DEPAKOTE SPRINKLE) 250 mg, Oral, 3 times daily    ergocalciferol (VITAMIN D2) 50,000 Units, Oral, Weekly    Fluticasone-Salmeterol (Advair Diskus) 100-50 mcg/dose inhaler 1 puff, Inhalation, 2 times daily, Rinse mouth after use.    melatonin 3 mg, Oral, Daily at bedtime    OLANZapine (ZYPREXA) 5 mg, Oral, Daily at bedtime    Ostomy Supplies (Premier Colostomy/Ileostomy) KIT Does not apply, Daily   Allergies[4]   Medications Ordered Prior to Encounter[5]   Social History[6]     Objective   /60 (BP Location: Left arm, Patient Position: Sitting, Cuff Size: Standard)   Pulse 90   Ht 5' 7\" (1.702 m)   Wt 44 kg (97 lb)   SpO2 97%   BMI 15.19 kg/m²     Physical Exam  Constitutional:       " "Appearance: Normal appearance.   HENT:      Head: Normocephalic and atraumatic.     Eyes:      Conjunctiva/sclera: Conjunctivae normal.     Pulmonary:      Effort: Pulmonary effort is normal. No respiratory distress.   Genitourinary:     Comments: 16F angelo catheter in place draining clear yellow urine    Musculoskeletal:         General: Normal range of motion.      Cervical back: Normal range of motion.     Skin:     General: Skin is warm and dry.     Neurological:      General: No focal deficit present.      Mental Status: She is alert and oriented to person, place, and time.     Psychiatric:         Mood and Affect: Mood normal.         Behavior: Behavior normal.           Results   Lab Results   Component Value Date    GLUCOSE 133 03/13/2015    CALCIUM 8.8 05/30/2025     03/13/2015    K 4.1 05/30/2025    CO2 30 05/30/2025    CL 98 05/30/2025    BUN 17 05/30/2025    CREATININE 0.34 (L) 05/30/2025     Lab Results   Component Value Date    WBC 7.57 05/23/2025    HGB 13.3 05/23/2025    HCT 40.1 05/23/2025    MCV 93 05/23/2025     05/23/2025       Office Urine Dip  Recent Results (from the past hour)   POCT urine dip    Collection Time: 07/10/25  3:44 PM   Result Value Ref Range    LEUKOCYTE ESTERASE,UA 2     NITRITE,UA 1     SL AMB POCT UROBILINOGEN n     POCT URINE PROTEIN n      PH,UA n     BLOOD,UA 1     SPECIFIC GRAVITY,UA 1.010     KETONES,UA n     BILIRUBIN,UA n     GLUCOSE, UA n      COLOR,UA cloudy     CLARITY,UA yellow    POCT Measure PVR    Collection Time: 07/10/25  3:45 PM   Result Value Ref Range    POST-VOID RESIDUAL VOLUME, ML  mL       Universal Protocol:  Consent: Verbal consent obtained  Risks and benefits: risks, benefits and alternatives were discussed  Consent given by: patient  Time out: Immediately prior to procedure a \"time out\" was called to verify the correct patient, procedure, equipment, support staff and site/side marked as required.  Timeout called at: 7/10/2025 " 4:11 PM.  Required items: required blood products, implants, devices, and special equipment available  Patient identity confirmed: verbally with patient    Bladder catheterization    Date/Time: 7/10/2025 3:40 PM    Performed by: SUZY Esposito  Authorized by: SUZY Esposito    Consent:     Consent given by:  Patient  Universal protocol:     Required blood products, implants, devices and special equipment available: yes      Immediately prior to procedure a time out was called: yes      Patient identity confirmed:  Verbally with patient  Pre-procedure details:     Procedure purpose:  Therapeutic    Preparation: Patient was prepped and draped in usual sterile fashion    Anesthesia (see MAR for exact dosages):     Anesthesia method:  None  Procedure details:     Catheter insertion:  Indwelling    Approach: natural orifice      Catheter type:  Angelo    Catheter size:  16 Fr    Number of attempts:  1    Successful placement: yes      Urine characteristics:  Yellow  Post-procedure details:     Patient tolerance of procedure:  Tolerated well, no immediate complications  Comments:      Patient placed in the supine position. Area prepped and draped with Betadine in the normal sterile fashion. Lidocaine urojet gel was introduced into the urethral meatus for lubrication. 16F angelo was inserted to the hub with minimal resistance. clear yellow urine returned and 10 mL sterile water was placed in the balloon. Angelo drained >1000 cc of clear yellow urine. Patient tolerated the procedure well. Attached to gravity drainage bag.    Complications:  None; patient tolerated the procedure well.       Condition: stable           [1]   Past Medical History:  Diagnosis Date    Alcoholism (HCC)     Depression     Hypothyroidism     PTSD (post-traumatic stress disorder)    [2]   Past Surgical History:  Procedure Laterality Date    COLOSTOMY      CYSTOSCOPY W/ URETERAL STENT PLACEMENT Left     EXPLORATORY LAPAROTOMY      SALPINGECTOMY  Bilateral 02/2025   [3]   Family History  Problem Relation Name Age of Onset    Heart disease Mother      Stroke Mother      Hypertension Mother      Kidney disease Mother      No Known Problems Father      No Known Problems Sister      No Known Problems Brother     [4] No Known Allergies  [5]   Current Outpatient Medications on File Prior to Visit   Medication Sig Dispense Refill    divalproex sodium (DEPAKOTE SPRINKLE) 125 MG capsule Take 2 capsules (250 mg total) by mouth 3 (three) times a day 180 capsule 2    Fluticasone-Salmeterol (Advair Diskus) 100-50 mcg/dose inhaler Inhale 1 puff 2 (two) times a day Rinse mouth after use. 60 blister 3    OLANZapine (ZyPREXA) 5 mg tablet Take 1 tablet (5 mg total) by mouth daily at bedtime 30 tablet 2    ergocalciferol (VITAMIN D2) 50,000 units Take 1 capsule (50,000 Units total) by mouth once a week for 7 doses (Patient not taking: Reported on 7/10/2025) 4 capsule 0    melatonin 3 mg Take 1 tablet (3 mg total) by mouth daily at bedtime (Patient not taking: Reported on 7/10/2025)      Ostomy Supplies (Premier Colostomy/Ileostomy) KIT by Does not apply route daily (Patient not taking: Reported on 5/9/2025) 1 kit 0     No current facility-administered medications on file prior to visit.   [6]   Social History  Tobacco Use    Smoking status: Every Day     Types: Cigarettes    Smokeless tobacco: Current   Vaping Use    Vaping status: Never Used   Substance and Sexual Activity    Alcohol use: Yes     Comment: drinks beer    Drug use: Not Currently     Comment: Denies use    Sexual activity: Not Currently

## 2025-07-10 NOTE — ASSESSMENT & PLAN NOTE
History of chronic urinary retention   Was recently taught to perform CIC 4x/day in May, 2025, however she is not interested in doing this   Hospitalized shortly after, requiring angelo catheter placement   Angelo was removed prior to discharge   UA today w/ positive leukocytes, nitrites, and blood  Will send out for urine culture   PVR today 603mls  New 16F angelo catheter placed in office today w/ >1L UO  Patient is interested in suprapubic catheter placement for management of chronic urinary retention   She is aware this will be maintained chronically with q4-6 week exchanges in the office   Consult placed to IR for SPT placement   She will maintain urethral angelo catheter until SPT placed   Orders:    Ambulatory Referral to Urology    POCT Measure PVR    POCT urine dip    Urine culture    Ambulatory Referral to Interventional Radiology; Future

## 2025-07-11 ENCOUNTER — TELEPHONE (OUTPATIENT)
Age: 60
End: 2025-07-11

## 2025-07-11 ENCOUNTER — PREP FOR PROCEDURE (OUTPATIENT)
Dept: INTERVENTIONAL RADIOLOGY/VASCULAR | Facility: CLINIC | Age: 60
End: 2025-07-11

## 2025-07-11 DIAGNOSIS — N30.00 ACUTE CYSTITIS WITHOUT HEMATURIA: Primary | ICD-10-CM

## 2025-07-11 DIAGNOSIS — R33.9 URINARY RETENTION: Primary | ICD-10-CM

## 2025-07-11 NOTE — TELEPHONE ENCOUNTER
Patient called to get urine culture results. Culture still pending. Patient was hoping for antibiotic.    Please advise.

## 2025-07-13 LAB
BACTERIA UR CULT: ABNORMAL
BACTERIA UR CULT: ABNORMAL

## 2025-07-14 ENCOUNTER — RESULTS FOLLOW-UP (OUTPATIENT)
Dept: UROLOGY | Facility: MEDICAL CENTER | Age: 60
End: 2025-07-14

## 2025-07-14 ENCOUNTER — NURSE TRIAGE (OUTPATIENT)
Dept: OTHER | Facility: OTHER | Age: 60
End: 2025-07-14

## 2025-07-14 ENCOUNTER — TELEPHONE (OUTPATIENT)
Age: 60
End: 2025-07-14

## 2025-07-14 DIAGNOSIS — N30.00 ACUTE CYSTITIS WITHOUT HEMATURIA: Primary | ICD-10-CM

## 2025-07-14 RX ORDER — NITROFURANTOIN 25; 75 MG/1; MG/1
100 CAPSULE ORAL 2 TIMES DAILY
Qty: 14 CAPSULE | Refills: 0 | Status: SHIPPED | OUTPATIENT
Start: 2025-07-14 | End: 2025-07-21

## 2025-07-14 RX ORDER — SULFAMETHOXAZOLE AND TRIMETHOPRIM 800; 160 MG/1; MG/1
1 TABLET ORAL EVERY 12 HOURS SCHEDULED
Qty: 14 TABLET | Refills: 0 | Status: SHIPPED | OUTPATIENT
Start: 2025-07-14 | End: 2025-07-23

## 2025-07-14 NOTE — TELEPHONE ENCOUNTER
Per communication consent left message on voicemail regarding patients urine test results and medication sent to pharmacy. Our phone number was left in case of any questions.

## 2025-07-14 NOTE — TELEPHONE ENCOUNTER
Urine culture returned positive for staphylococcus epidermidis bacteria. Script for Macrobid will be sent to the patients pharamcy -- 2 tablets daily x 7 days. If symptoms persist following complete course of antibiotics please reach out to the office for further recommendations.

## 2025-07-14 NOTE — TELEPHONE ENCOUNTER
We have received a referral for this patient, there are no openings within the recommended timeframe. Please advise    Date Referral Received: 7/13/25    Priority of Referral: Routine    Referred by: Isabella Turner MD    Diagnosis: COPD, Pulmonary nodule, Smoking history    Time Frame: 3 days    Location: [place]

## 2025-07-14 NOTE — TELEPHONE ENCOUNTER
----- Message from SUZY Esposito sent at 7/14/2025  8:20 AM EDT -----  Please let the patient know that her urine culture returned positive for a UTI. I have sent her a script for bactrim x7 days to her pharmacy. Thank you!  ----- Message -----  From: Tammy Doty MA  Sent: 7/10/2025   3:45 PM EDT  To: SUZY Esposito

## 2025-07-15 NOTE — TELEPHONE ENCOUNTER
Reason for Disposition  • Message left on identified voice mail    Answer Assessment - Initial Assessment Questions  Attempt x1/ left VM stating that if further assistance is needed to call back. (One call attempt)    Protocols used: No Contact or Duplicate Contact Call-Adult-

## 2025-07-15 NOTE — TELEPHONE ENCOUNTER
"Regarding: Medication Question, Possible dup meds for same Dx prescribed by 2 providers  ----- Message from Ariel NDIAYE sent at 7/14/2025  8:24 PM EDT -----  \"My mom was prescribed 2 different antibiotics for the same condition by 2 different providers. Is she supposed to take both or just one? If only one, which one?\"    "

## 2025-07-16 NOTE — TELEPHONE ENCOUNTER
Recommend the patient proceeds with Macrobid as this medication is safer/gentler on her kidneys. 2 tablets daily x 7 days.

## 2025-07-17 ENCOUNTER — DOCUMENTATION (OUTPATIENT)
Dept: PULMONOLOGY | Facility: CLINIC | Age: 60
End: 2025-07-17

## 2025-07-17 NOTE — PROGRESS NOTES
LUNG NODULE CONFERENCE DISCUSSION:    Zohreh Solis,    Your patient, Berta Smart, was discussed by our lung nodule conference team. Full report of discussion can be found in patient record.     Based upon the discussion, we are deferring to your judgment on follow up interval post-consult visit on 7/23/2025. We will follow up for your recommendation once the visit is completed .      Thanks,  Lung Nodule Review Team    LUNG NODULE BOARD CONFERENCE DISCUSSION    DATE REVIEWED:  7/17/2025    DATE OF CHEST CT: 7/3/2025    REFERRING: Dr. David Solis    BOARD RECOMMENDATIONS:    Imaging Studies: NA    Referrals: NA    Procedures: NA    PATIENT COMMUNICATION / INSTRUCTIONS: Deferring to 's recommendation post consult 7/23/2025. Will follow up with Dr. Solis at that time.          DISCLAIMERS:    TO THE TREATING/REFERRING PHYSICIAN:  This conference is a meeting of clinicians who evaluate and discuss patients whom have been identified as having concerning findings on chest CT (LUNG RADS 3/4). Please note that the above opinion was a consensus of the conference attendees and is intended only to assist in quality care of the discussed patient.  The responsibility for follow up on the input given during the conference, along with any final decisions regarding plan of care, is that of the patient, patient's primary provider and specialists who will be involved in care plan moving forward. Nodule Nurse Navigators will assist in coordination of necessary appointments as outlined above.      TO THE PATIENT:  This summary is a brief record of care recommendations regarding abnormal CT findings. You may choose to share a copy with any of your doctors or nurses. However, this is not a detailed or comprehensive record of your care.

## 2025-07-17 NOTE — TELEPHONE ENCOUNTER
Left message on daughter Mayra phone stating recommendation for which medication patient should take. Our phone number was left in case of any questions.

## 2025-07-17 NOTE — TELEPHONE ENCOUNTER
----- Message from Hortensia Mayfield PA-C sent at 7/16/2025  7:36 AM EDT -----      ----- Message -----  From: Rebecca Gallegos  Sent: 7/15/2025   5:06 PM EDT  To: [facility] Provider    ----- Message from Rebecca Gallegos sent at 7/15/2025  5:06 PM EDT -----

## 2025-07-23 ENCOUNTER — CONSULT (OUTPATIENT)
Age: 60
End: 2025-07-23
Payer: COMMERCIAL

## 2025-07-23 VITALS
OXYGEN SATURATION: 95 % | SYSTOLIC BLOOD PRESSURE: 110 MMHG | TEMPERATURE: 98.9 F | WEIGHT: 93.4 LBS | HEIGHT: 67 IN | HEART RATE: 104 BPM | BODY MASS INDEX: 14.66 KG/M2 | DIASTOLIC BLOOD PRESSURE: 62 MMHG

## 2025-07-23 DIAGNOSIS — R64 CACHEXIA (HCC): ICD-10-CM

## 2025-07-23 DIAGNOSIS — Z72.0 TOBACCO ABUSE: Primary | ICD-10-CM

## 2025-07-23 DIAGNOSIS — R93.89 ABNORMAL CT OF THE CHEST: ICD-10-CM

## 2025-07-23 DIAGNOSIS — J43.1 PANLOBULAR EMPHYSEMA (HCC): ICD-10-CM

## 2025-07-23 DIAGNOSIS — Z87.891 SMOKING HISTORY: ICD-10-CM

## 2025-07-23 DIAGNOSIS — R91.1 PULMONARY NODULE: ICD-10-CM

## 2025-07-23 DIAGNOSIS — Z71.6 TOBACCO ABUSE COUNSELING: ICD-10-CM

## 2025-07-23 DIAGNOSIS — J44.9 CHRONIC OBSTRUCTIVE PULMONARY DISEASE, UNSPECIFIED COPD TYPE (HCC): ICD-10-CM

## 2025-07-23 PROCEDURE — 99245 OFF/OP CONSLTJ NEW/EST HI 55: CPT | Performed by: INTERNAL MEDICINE

## 2025-07-23 PROCEDURE — 99407 BEHAV CHNG SMOKING > 10 MIN: CPT | Performed by: INTERNAL MEDICINE

## 2025-07-23 RX ORDER — VARENICLINE TARTRATE 0.5 (11)-1
KIT ORAL
Qty: 1 EACH | Refills: 0 | Status: SHIPPED | OUTPATIENT
Start: 2025-07-23 | End: 2025-09-02

## 2025-07-23 RX ORDER — FLUTICASONE FUROATE, UMECLIDINIUM BROMIDE AND VILANTEROL TRIFENATATE 200; 62.5; 25 UG/1; UG/1; UG/1
1 POWDER RESPIRATORY (INHALATION) DAILY
Qty: 60 BLISTER | Refills: 3 | Status: SHIPPED | OUTPATIENT
Start: 2025-07-23 | End: 2025-11-20

## 2025-07-23 NOTE — PROGRESS NOTES
Pulmonary Consultation   Berta Smart 59 y.o. female MRN: [id number]  7/23/2025    Referring Physician or Provider:  Isabella Turner MD  [id number]   [address] [address]       Chief Complaint:  COPD, pulmonary nodules      HPI:    59-year-old female with past medical history as below presents for evaluation of shortness of breath with all exertional activities.  Patient states that symptoms started approximately 1-1/2 years ago and has worsened since then.  She currently smokes 1-1/2 to 2 packs of cigarettes daily and has done so for the last 45 years.  She states that she has never tried to actively quit smoking.  She denies fevers or chills.  No recent pulmonary illness.  She states that she recently had a urinary tract infection and was treated with antibiotics but is since completed this.  She denies any inhaled drug exposures.  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  Endorses occasional cough without significant productive component.  She takes Advair 100 twice daily.  She states that she has prescribed this as she feels that other inhalers were not covered by her insurance.      Past Medical Hx  Past Medical History:   Diagnosis Date    Alcoholism (HCC)     Depression     Hypothyroidism     PTSD (post-traumatic stress disorder)            Past Surgical Hx  Past Surgical History:   Procedure Laterality Date    COLOSTOMY      CYSTOSCOPY W/ URETERAL STENT PLACEMENT Left     EXPLORATORY LAPAROTOMY      SALPINGECTOMY Bilateral 02/2025           Family Hx  Family History   Problem Relation Name Age of Onset    Heart disease Mother      Stroke Mother      Hypertension Mother      Kidney disease Mother      No Known Problems Father      No Known Problems Sister      No Known Problems Brother             Occupational History:   No known previous inhalation injuries      Social History:   Social History      Socioeconomic History    Marital status: /Civil Union     Spouse name: Not on file    Number of children: Not on file    Years of education: Not on file    Highest education level: Not on file   Occupational History    Not on file   Tobacco Use    Smoking status: Every Day     Types: Cigarettes    Smokeless tobacco: Never   Vaping Use    Vaping status: Never Used   Substance and Sexual Activity    Alcohol use: Yes     Comment: drinks beer    Drug use: Not Currently     Comment: Denies use    Sexual activity: Not Currently   Other Topics Concern    Not on file   Social History Narrative    Not on file     Social Drivers of Health     Financial Resource Strain: Not on file   Food Insecurity: No Food Insecurity (5/23/2025)    Nursing - Inadequate Food Risk Classification     Worried About Running Out of Food in the Last Year: Sometimes true     Ran Out of Food in the Last Year: Sometimes true     Ran Out of Food in the Last Year: Never true   Recent Concern: Food Insecurity - Food Insecurity Present (5/23/2025)    Hunger Vital Sign     Worried About Running Out of Food in the Last Year: Sometimes true     Ran Out of Food in the Last Year: Sometimes true   Transportation Needs: No Transportation Needs (5/23/2025)    PRAPARE - Transportation     Lack of Transportation (Medical): No     Lack of Transportation (Non-Medical): No   Physical Activity: Inactive (7/30/2020)    Exercise Vital Sign     Days of Exercise per Week: 0 days     Minutes of Exercise per Session: 0 min   Stress: Stress Concern Present (7/30/2020)    [employer] - Occupational Stress Questionnaire     Feeling of Stress : Very much   Social Connections: Moderately Isolated (7/30/2020)    Social Connection and Isolation Panel     Frequency of Communication with Friends and Family: More than three times a week     Frequency of Social Gatherings with Friends and Family: Never     Attends Yarsanism Services: Never      "Active Member of Clubs or Organizations: No     Attends Club or Organization Meetings: Never     Marital Status:    Intimate Partner Violence: Unknown (5/22/2025)    Nursing IPS     Feels Physically and Emotionally Safe: Not on file     Physically Hurt by Someone: Not on file     Humiliated or Emotionally Abused by Someone: Not on file     Physically Hurt by Someone: No     Hurt or Threatened by Someone: No   Housing Stability: Low Risk  (5/23/2025)    Housing Stability Vital Sign     Unable to Pay for Housing in the Last Year: No     Number of Times Moved in the Last Year: 0     Homeless in the Last Year: No   Recent Concern: Housing Stability - At Risk (4/4/2025)    Nursing: Inadequate Housing Risk Classification     Has Housing: Not on file     Worried About Losing Housing: Not on file     Unable to Get Utilities: Not on file     Unable to Pay for Housing in the Last Year: Yes     Has Housing: Yes            Pertinent Meds  Advair 100 twice daily      ROS:  Constitutional: - Fatigue, - chills, - fever, - weight change.   HEENT: - rhinorrhea, - sneezing, - sore throat.    Respiratory: + cough, - sputum production, + shortness of breath, - wheezing.    Cardiovascular: - chest pain,  -palpitations, - leg swelling.   Gastrointestinal: - abdominal pain, - constipation, - diarrhea, - nausea, - vomiting.   Endocrine: - cold intolerance, - heat intolerance.   Genitourinary: - dysuria.   Musculoskeletal: - arthralgias.   Skin:- rash, - wound.   Allergic/Immunologic: - allergies  Neurological: - dizziness, - numbness      Vitals: Blood pressure 110/62, pulse 104, temperature 98.9 °F (37.2 °C), temperature source Tympanic, height 5' 7\" (1.702 m), weight 42.4 kg (93 lb 6.4 oz), SpO2 95%., Body mass index is 14.63 kg/m².    Physical Exam  GEN  NAD, + cachexia  HEENT  ncat, non icteric, MM moist  NECK  supple, no JVD, no LAD  CV  +s1s2, no mrg, RRR  PULM markedly diminished breath sounds diffusely, otherwise CTA BL, no " wrr  ABD  soft, ntnd, + BS, + cachectic  EXT or extremity pitting edema, no cyanosis, + hand clubbing  NEURO  Aox3, no focal weakness    Imaging and other studies     I have personally viewed and interpreted the following studies:  CT chest 7/3/2025 shows combined centrilobular and paraseptal emphysema.  At least 3 groundglass/cystic lung nodules on the right lower lobe.      Pulmonary function testing:   PFT 7/8/2025:  Not meet ATS standards for acceptability/repeatability based on given values results are below.  Severe/very severe obstructive defect-without dilator response  + Air trapping and hyperinflation.  + Severe diffusing impairment      Assessment:  CT chest abnormality  COPD  Emphysema  Tobacco abuse  Cachexia    Plan:  I counseled patient for at least 15 minutes on the importance of tobacco cessation.  Patient is amenable to trying Chantix.  Chantix prescribed to her pharmacy.  We discussed in length that tobacco cessation would likely improve the quality and quantity of her life.  She is motivated to quit prior to next visit.  I suspect the etiology of patient's symptoms of shortness of breath with all exertional exercises COPD/emphysema.  Discontinue Advair.  I have prescribed Trelegy 200 once daily.  Patient has a repeat CT chest ordered for 3 months from previous.  Next occurrence will be approximately 10/3/2025.  This will be scheduled for her upon checkout today.  Unclear etiology of cachexia.  Certainly this patient's level of emphysema can contribute.  I will defer management of this to patient's primary care physician.  This note be forwarded patient's primary care physician.  Patient will call pulmonary office with any worsening or development of new pulmonary symptoms prior to next visit.    Return visit in late October 2025  On next visit we will evaluate symptoms, compliance with Trelegy, success with tobacco cessation, repeat CT chest results    Note: Portions of the record may have been  "created with voice recognition software. Occasional wrong word or \"sound a like\" substitutions may have occurred due to the inherent limitations of voice recognition software. Read the chart carefully and recognize, using context, where substitutions have occurred.     Cesar Solis M.D.  [facility]  "

## 2025-07-24 ENCOUNTER — DOCUMENTATION (OUTPATIENT)
Dept: PULMONOLOGY | Facility: CLINIC | Age: 60
End: 2025-07-24

## 2025-07-24 NOTE — PROGRESS NOTES
Post visit with Dr. Solis.     Will follow 3 month chest CT scan interval with pulmonary follow up.    Next CT scheduled 10/14/2025 with pulmonary consult scheduled 10/31/2025.    Thank you!

## 2025-07-28 DIAGNOSIS — Z87.891 SMOKING HISTORY: ICD-10-CM

## 2025-07-28 RX ORDER — VARENICLINE TARTRATE 0.5 (11)-1
KIT ORAL
Refills: 0 | OUTPATIENT
Start: 2025-07-28

## 2025-07-29 NOTE — CASE MANAGEMENT
CM received hearing information from [facility]:    Good afternoon:  Your client's mental health hearing will be heard on Tuesday at approximately 11:45.    If you would like to receive updates via text regarding your hearing time, reply to this message with the number to send the updates.    Thank you      Link:                       [email or web address]  Zoom ID:                Password:           [email or web address]      [facility] Coordinator  [facility], [facility].  [address] [place] [address] [phone]     Plan: Discussed medrock bright bottoms cream, patient declined at this time Render In Strict Bullet Format?: No Initiate Treatment: Tretinoin every other night Continue Regimen: Glycolic acid every other night alternating with tretinoin Detail Level: Zone

## 2025-08-07 ENCOUNTER — PROCEDURE VISIT (OUTPATIENT)
Dept: UROLOGY | Facility: HOSPITAL | Age: 60
End: 2025-08-07
Payer: COMMERCIAL

## 2025-08-07 VITALS
OXYGEN SATURATION: 99 % | DIASTOLIC BLOOD PRESSURE: 60 MMHG | HEART RATE: 55 BPM | WEIGHT: 97.4 LBS | HEIGHT: 65 IN | BODY MASS INDEX: 16.23 KG/M2 | SYSTOLIC BLOOD PRESSURE: 110 MMHG

## 2025-08-07 DIAGNOSIS — R33.9 URINARY RETENTION: Primary | ICD-10-CM

## 2025-08-07 PROCEDURE — 51702 INSERT TEMP BLADDER CATH: CPT

## 2025-08-13 ENCOUNTER — OFFICE VISIT (OUTPATIENT)
Dept: PSYCHIATRY | Facility: CLINIC | Age: 60
End: 2025-08-13
Payer: COMMERCIAL

## 2025-08-18 ENCOUNTER — TELEPHONE (OUTPATIENT)
Dept: PSYCHIATRY | Facility: CLINIC | Age: 60
End: 2025-08-18

## 2025-08-22 ENCOUNTER — ANESTHESIA (OUTPATIENT)
Dept: INTERVENTIONAL RADIOLOGY/VASCULAR | Facility: HOSPITAL | Age: 60
End: 2025-08-22
Payer: COMMERCIAL

## 2025-08-22 ENCOUNTER — HOSPITAL ENCOUNTER (OUTPATIENT)
Dept: INTERVENTIONAL RADIOLOGY/VASCULAR | Facility: HOSPITAL | Age: 60
Discharge: HOME | End: 2025-08-22
Attending: RADIOLOGY
Payer: COMMERCIAL

## 2025-08-22 VITALS
DIASTOLIC BLOOD PRESSURE: 80 MMHG | RESPIRATION RATE: 18 BRPM | HEIGHT: 65 IN | HEART RATE: 76 BPM | SYSTOLIC BLOOD PRESSURE: 118 MMHG | WEIGHT: 97 LBS | TEMPERATURE: 97.2 F | OXYGEN SATURATION: 95 % | BODY MASS INDEX: 16.16 KG/M2

## 2025-08-22 DIAGNOSIS — R33.9 URINARY RETENTION: ICD-10-CM

## 2025-08-22 LAB
ERYTHROCYTE [DISTWIDTH] IN BLOOD BY AUTOMATED COUNT: 14.6 % (ref 11.6–15.1)
HCT VFR BLD AUTO: 47.2 % (ref 34.8–46.1)
HGB BLD-MCNC: 15.8 G/DL (ref 11.5–15.4)
INR PPP: 0.93 (ref 0.85–1.19)
MCH RBC QN AUTO: 31.7 PG (ref 26.8–34.3)
MCHC RBC AUTO-ENTMCNC: 33.5 G/DL (ref 31.4–37.4)
MCV RBC AUTO: 95 FL (ref 82–98)
PLATELET # BLD AUTO: 224 THOUSANDS/UL (ref 149–390)
PMV BLD AUTO: 9.2 FL (ref 8.9–12.7)
PROTHROMBIN TIME: 12.9 SECONDS (ref 12.3–15)
RBC # BLD AUTO: 4.99 MILLION/UL (ref 3.81–5.12)
WBC # BLD AUTO: 7.41 THOUSAND/UL (ref 4.31–10.16)

## 2025-08-22 PROCEDURE — 85027 COMPLETE CBC AUTOMATED: CPT | Performed by: RADIOLOGY

## 2025-08-22 PROCEDURE — 51102 DRAIN BL W/CATH INSERTION: CPT

## 2025-08-22 PROCEDURE — 85610 PROTHROMBIN TIME: CPT | Performed by: RADIOLOGY

## 2025-08-22 PROCEDURE — 76942 ECHO GUIDE FOR BIOPSY: CPT

## 2025-08-22 PROCEDURE — C1725 CATH, TRANSLUMIN NON-LASER: HCPCS

## 2025-08-22 PROCEDURE — C1769 GUIDE WIRE: HCPCS

## 2025-08-22 RX ORDER — LIDOCAINE HYDROCHLORIDE 10 MG/ML
INJECTION, SOLUTION EPIDURAL; INFILTRATION; INTRACAUDAL; PERINEURAL AS NEEDED
Status: DISCONTINUED | OUTPATIENT
Start: 2025-08-22 | End: 2025-08-22

## 2025-08-22 RX ORDER — PROPOFOL 10 MG/ML
INJECTION, EMULSION INTRAVENOUS CONTINUOUS PRN
Status: DISCONTINUED | OUTPATIENT
Start: 2025-08-22 | End: 2025-08-22

## 2025-08-22 RX ORDER — DEXAMETHASONE SODIUM PHOSPHATE 10 MG/ML
INJECTION, SOLUTION INTRAMUSCULAR; INTRAVENOUS AS NEEDED
Status: DISCONTINUED | OUTPATIENT
Start: 2025-08-22 | End: 2025-08-22

## 2025-08-22 RX ORDER — ALBUTEROL SULFATE 0.83 MG/ML
2.5 SOLUTION RESPIRATORY (INHALATION) ONCE AS NEEDED
Status: DISCONTINUED | OUTPATIENT
Start: 2025-08-22 | End: 2025-08-23 | Stop reason: HOSPADM

## 2025-08-22 RX ORDER — FENTANYL CITRATE 50 UG/ML
INJECTION, SOLUTION INTRAMUSCULAR; INTRAVENOUS AS NEEDED
Status: DISCONTINUED | OUTPATIENT
Start: 2025-08-22 | End: 2025-08-22

## 2025-08-22 RX ORDER — PHENYLEPHRINE HCL IN 0.9% NACL 1 MG/10 ML
SYRINGE (ML) INTRAVENOUS AS NEEDED
Status: DISCONTINUED | OUTPATIENT
Start: 2025-08-22 | End: 2025-08-22

## 2025-08-22 RX ORDER — ONDANSETRON 2 MG/ML
4 INJECTION INTRAMUSCULAR; INTRAVENOUS ONCE AS NEEDED
Status: DISCONTINUED | OUTPATIENT
Start: 2025-08-22 | End: 2025-08-23 | Stop reason: HOSPADM

## 2025-08-22 RX ORDER — ONDANSETRON 2 MG/ML
INJECTION INTRAMUSCULAR; INTRAVENOUS AS NEEDED
Status: DISCONTINUED | OUTPATIENT
Start: 2025-08-22 | End: 2025-08-22

## 2025-08-22 RX ORDER — CEFAZOLIN SODIUM 2 G/50ML
2000 SOLUTION INTRAVENOUS ONCE
Status: COMPLETED | OUTPATIENT
Start: 2025-08-22 | End: 2025-08-22

## 2025-08-22 RX ORDER — MIDAZOLAM HYDROCHLORIDE 2 MG/2ML
INJECTION, SOLUTION INTRAMUSCULAR; INTRAVENOUS AS NEEDED
Status: DISCONTINUED | OUTPATIENT
Start: 2025-08-22 | End: 2025-08-22

## 2025-08-22 RX ORDER — HYDROMORPHONE HCL IN WATER/PF 6 MG/30 ML
0.2 PATIENT CONTROLLED ANALGESIA SYRINGE INTRAVENOUS
Status: DISCONTINUED | OUTPATIENT
Start: 2025-08-22 | End: 2025-08-23 | Stop reason: HOSPADM

## 2025-08-22 RX ORDER — ACETAMINOPHEN 325 MG/1
650 TABLET ORAL EVERY 4 HOURS PRN
Status: DISCONTINUED | OUTPATIENT
Start: 2025-08-22 | End: 2025-08-23 | Stop reason: HOSPADM

## 2025-08-22 RX ORDER — SODIUM CHLORIDE 9 MG/ML
INJECTION, SOLUTION INTRAVENOUS CONTINUOUS PRN
Status: DISCONTINUED | OUTPATIENT
Start: 2025-08-22 | End: 2025-08-22

## 2025-08-22 RX ORDER — SODIUM CHLORIDE 9 MG/ML
50 INJECTION, SOLUTION INTRAVENOUS CONTINUOUS
Status: DISCONTINUED | OUTPATIENT
Start: 2025-08-22 | End: 2025-08-23 | Stop reason: HOSPADM

## 2025-08-22 RX ORDER — FENTANYL CITRATE/PF 50 MCG/ML
25 SYRINGE (ML) INJECTION
Status: DISCONTINUED | OUTPATIENT
Start: 2025-08-22 | End: 2025-08-23 | Stop reason: HOSPADM

## 2025-08-22 RX ADMIN — DEXAMETHASONE SODIUM PHOSPHATE 10 MG: 10 INJECTION, SOLUTION INTRAMUSCULAR; INTRAVENOUS at 09:10

## 2025-08-22 RX ADMIN — MIDAZOLAM 2 MG: 1 INJECTION INTRAMUSCULAR; INTRAVENOUS at 09:10

## 2025-08-22 RX ADMIN — LIDOCAINE HYDROCHLORIDE 50 MG: 10 INJECTION, SOLUTION EPIDURAL; INFILTRATION; INTRACAUDAL at 09:10

## 2025-08-22 RX ADMIN — FENTANYL CITRATE 50 MCG: 50 INJECTION, SOLUTION INTRAMUSCULAR; INTRAVENOUS at 09:12

## 2025-08-22 RX ADMIN — Medication 200 MCG: at 09:35

## 2025-08-22 RX ADMIN — SODIUM CHLORIDE: 0.9 INJECTION, SOLUTION INTRAVENOUS at 09:02

## 2025-08-22 RX ADMIN — ONDANSETRON 4 MG: 2 INJECTION INTRAMUSCULAR; INTRAVENOUS at 09:10

## 2025-08-22 RX ADMIN — CEFAZOLIN SODIUM 2000 MG: 2 SOLUTION INTRAVENOUS at 09:11

## 2025-08-22 RX ADMIN — FENTANYL CITRATE 50 MCG: 50 INJECTION, SOLUTION INTRAMUSCULAR; INTRAVENOUS at 09:10

## 2025-08-22 RX ADMIN — PROPOFOL 100 MCG/KG/MIN: 10 INJECTION, EMULSION INTRAVENOUS at 09:10

## (undated) LAB — TSI SER-ACNC: <0.1 IU/L (ref 0–0.55)